# Patient Record
Sex: MALE | Race: WHITE | Employment: UNEMPLOYED | URBAN - METROPOLITAN AREA
[De-identification: names, ages, dates, MRNs, and addresses within clinical notes are randomized per-mention and may not be internally consistent; named-entity substitution may affect disease eponyms.]

---

## 2019-07-07 ENCOUNTER — HOSPITAL ENCOUNTER (INPATIENT)
Age: 54
LOS: 8 days | Discharge: HOME HEALTH | DRG: 854 | End: 2019-07-15
Attending: EMERGENCY MEDICINE | Admitting: INTERNAL MEDICINE
Payer: COMMERCIAL

## 2019-07-07 ENCOUNTER — APPOINTMENT (OUTPATIENT)
Dept: GENERAL RADIOLOGY | Age: 54
DRG: 854 | End: 2019-07-07
Payer: COMMERCIAL

## 2019-07-07 DIAGNOSIS — L03.115 CELLULITIS OF RIGHT FOOT: ICD-10-CM

## 2019-07-07 DIAGNOSIS — A41.9 SEPTICEMIA (HCC): Primary | ICD-10-CM

## 2019-07-07 DIAGNOSIS — R50.9 FEVER, UNSPECIFIED FEVER CAUSE: ICD-10-CM

## 2019-07-07 DIAGNOSIS — M86.171 OTHER ACUTE OSTEOMYELITIS OF RIGHT FOOT (HCC): ICD-10-CM

## 2019-07-07 DIAGNOSIS — S92.351A CLOSED DISPLACED FRACTURE OF FIFTH METATARSAL BONE OF RIGHT FOOT, INITIAL ENCOUNTER: ICD-10-CM

## 2019-07-07 PROBLEM — L08.9 TYPE 2 DIABETES MELLITUS WITH RIGHT DIABETIC FOOT INFECTION (HCC): Status: ACTIVE | Noted: 2019-07-07

## 2019-07-07 PROBLEM — M86.9 OSTEOMYELITIS (HCC): Status: ACTIVE | Noted: 2019-07-07

## 2019-07-07 PROBLEM — I15.2 HYPERTENSION ASSOCIATED WITH DIABETES (HCC): Status: ACTIVE | Noted: 2019-03-26

## 2019-07-07 PROBLEM — L08.9 RIGHT FOOT INFECTION: Status: ACTIVE | Noted: 2019-07-07

## 2019-07-07 PROBLEM — E11.9 TYPE 2 DIABETES MELLITUS (HCC): Status: ACTIVE | Noted: 2019-03-26

## 2019-07-07 PROBLEM — E11.628 TYPE 2 DIABETES MELLITUS WITH RIGHT DIABETIC FOOT INFECTION (HCC): Status: ACTIVE | Noted: 2019-07-07

## 2019-07-07 PROBLEM — E11.59 HYPERTENSION ASSOCIATED WITH DIABETES (HCC): Status: ACTIVE | Noted: 2019-03-26

## 2019-07-07 LAB
A/G RATIO: 0.8 (ref 1.1–2.2)
ALBUMIN SERPL-MCNC: 3.4 G/DL (ref 3.4–5)
ALP BLD-CCNC: 126 U/L (ref 40–129)
ALT SERPL-CCNC: 21 U/L (ref 10–40)
ANION GAP SERPL CALCULATED.3IONS-SCNC: 16 MMOL/L (ref 3–16)
AST SERPL-CCNC: 28 U/L (ref 15–37)
BASE EXCESS VENOUS: 2.5 MMOL/L (ref -3–3)
BASOPHILS ABSOLUTE: 0.1 K/UL (ref 0–0.2)
BASOPHILS RELATIVE PERCENT: 0.8 %
BILIRUB SERPL-MCNC: 0.6 MG/DL (ref 0–1)
BUN BLDV-MCNC: 17 MG/DL (ref 7–20)
CALCIUM SERPL-MCNC: 9.2 MG/DL (ref 8.3–10.6)
CARBOXYHEMOGLOBIN: 1.5 % (ref 0–1.5)
CHLORIDE BLD-SCNC: 93 MMOL/L (ref 99–110)
CO2: 24 MMOL/L (ref 21–32)
CREAT SERPL-MCNC: 1 MG/DL (ref 0.9–1.3)
EOSINOPHILS ABSOLUTE: 0 K/UL (ref 0–0.6)
EOSINOPHILS RELATIVE PERCENT: 0.1 %
GFR AFRICAN AMERICAN: >60
GFR NON-AFRICAN AMERICAN: >60
GLOBULIN: 4.2 G/DL
GLUCOSE BLD-MCNC: 133 MG/DL (ref 70–99)
GLUCOSE BLD-MCNC: 158 MG/DL (ref 70–99)
HCO3 VENOUS: 24.3 MMOL/L (ref 23–29)
HCT VFR BLD CALC: 39.9 % (ref 40.5–52.5)
HEMOGLOBIN: 13.7 G/DL (ref 13.5–17.5)
LACTIC ACID, SEPSIS: 1.4 MMOL/L (ref 0.4–1.9)
LYMPHOCYTES ABSOLUTE: 1.8 K/UL (ref 1–5.1)
LYMPHOCYTES RELATIVE PERCENT: 12.8 %
MCH RBC QN AUTO: 30.1 PG (ref 26–34)
MCHC RBC AUTO-ENTMCNC: 34.2 G/DL (ref 31–36)
MCV RBC AUTO: 88 FL (ref 80–100)
METHEMOGLOBIN VENOUS: 0.6 %
MONOCYTES ABSOLUTE: 1.3 K/UL (ref 0–1.3)
MONOCYTES RELATIVE PERCENT: 9.9 %
NEUTROPHILS ABSOLUTE: 10.4 K/UL (ref 1.7–7.7)
NEUTROPHILS RELATIVE PERCENT: 76.4 %
O2 CONTENT, VEN: 19 VOL %
O2 SAT, VEN: 94 %
O2 THERAPY: ABNORMAL
PCO2, VEN: 29.9 MMHG (ref 40–50)
PDW BLD-RTO: 12.8 % (ref 12.4–15.4)
PERFORMED ON: ABNORMAL
PH VENOUS: 7.53 (ref 7.35–7.45)
PLATELET # BLD: 351 K/UL (ref 135–450)
PMV BLD AUTO: 7.2 FL (ref 5–10.5)
PO2, VEN: 64.9 MMHG (ref 25–40)
POTASSIUM SERPL-SCNC: 4.1 MMOL/L (ref 3.5–5.1)
RBC # BLD: 4.53 M/UL (ref 4.2–5.9)
SEDIMENTATION RATE, ERYTHROCYTE: 101 MM/HR (ref 0–20)
SODIUM BLD-SCNC: 133 MMOL/L (ref 136–145)
TCO2 CALC VENOUS: 25 MMOL/L
TOTAL PROTEIN: 7.6 G/DL (ref 6.4–8.2)
WBC # BLD: 13.7 K/UL (ref 4–11)

## 2019-07-07 PROCEDURE — 86403 PARTICLE AGGLUT ANTBDY SCRN: CPT

## 2019-07-07 PROCEDURE — 6370000000 HC RX 637 (ALT 250 FOR IP): Performed by: PHYSICIAN ASSISTANT

## 2019-07-07 PROCEDURE — 82803 BLOOD GASES ANY COMBINATION: CPT

## 2019-07-07 PROCEDURE — 2580000003 HC RX 258: Performed by: INTERNAL MEDICINE

## 2019-07-07 PROCEDURE — 73630 X-RAY EXAM OF FOOT: CPT

## 2019-07-07 PROCEDURE — 2580000003 HC RX 258: Performed by: PHYSICIAN ASSISTANT

## 2019-07-07 PROCEDURE — 6360000002 HC RX W HCPCS: Performed by: INTERNAL MEDICINE

## 2019-07-07 PROCEDURE — 87205 SMEAR GRAM STAIN: CPT

## 2019-07-07 PROCEDURE — 96367 TX/PROPH/DG ADDL SEQ IV INF: CPT

## 2019-07-07 PROCEDURE — 80053 COMPREHEN METABOLIC PANEL: CPT

## 2019-07-07 PROCEDURE — 99284 EMERGENCY DEPT VISIT MOD MDM: CPT

## 2019-07-07 PROCEDURE — 6370000000 HC RX 637 (ALT 250 FOR IP): Performed by: INTERNAL MEDICINE

## 2019-07-07 PROCEDURE — 87077 CULTURE AEROBIC IDENTIFY: CPT

## 2019-07-07 PROCEDURE — 87070 CULTURE OTHR SPECIMN AEROBIC: CPT

## 2019-07-07 PROCEDURE — 6360000002 HC RX W HCPCS: Performed by: PHYSICIAN ASSISTANT

## 2019-07-07 PROCEDURE — 85025 COMPLETE CBC W/AUTO DIFF WBC: CPT

## 2019-07-07 PROCEDURE — 87040 BLOOD CULTURE FOR BACTERIA: CPT

## 2019-07-07 PROCEDURE — 1200000000 HC SEMI PRIVATE

## 2019-07-07 PROCEDURE — 83605 ASSAY OF LACTIC ACID: CPT

## 2019-07-07 PROCEDURE — 87186 SC STD MICRODIL/AGAR DIL: CPT

## 2019-07-07 PROCEDURE — 86140 C-REACTIVE PROTEIN: CPT

## 2019-07-07 PROCEDURE — 85652 RBC SED RATE AUTOMATED: CPT

## 2019-07-07 PROCEDURE — 96365 THER/PROPH/DIAG IV INF INIT: CPT

## 2019-07-07 RX ORDER — CYCLOBENZAPRINE HCL 10 MG
10 TABLET ORAL 3 TIMES DAILY PRN
Status: DISCONTINUED | OUTPATIENT
Start: 2019-07-07 | End: 2019-07-15 | Stop reason: HOSPADM

## 2019-07-07 RX ORDER — SODIUM CHLORIDE 0.9 % (FLUSH) 0.9 %
10 SYRINGE (ML) INJECTION EVERY 12 HOURS SCHEDULED
Status: DISCONTINUED | OUTPATIENT
Start: 2019-07-07 | End: 2019-07-15 | Stop reason: HOSPADM

## 2019-07-07 RX ORDER — DEXTROSE MONOHYDRATE 50 MG/ML
100 INJECTION, SOLUTION INTRAVENOUS PRN
Status: DISCONTINUED | OUTPATIENT
Start: 2019-07-07 | End: 2019-07-15 | Stop reason: HOSPADM

## 2019-07-07 RX ORDER — GLIPIZIDE 5 MG/1
15 TABLET ORAL DAILY PRN
COMMUNITY
Start: 2019-04-30 | End: 2021-08-24

## 2019-07-07 RX ORDER — 0.9 % SODIUM CHLORIDE 0.9 %
30 INTRAVENOUS SOLUTION INTRAVENOUS ONCE
Status: COMPLETED | OUTPATIENT
Start: 2019-07-07 | End: 2019-07-07

## 2019-07-07 RX ORDER — ACETAMINOPHEN 500 MG
1000 TABLET ORAL ONCE
Status: COMPLETED | OUTPATIENT
Start: 2019-07-07 | End: 2019-07-07

## 2019-07-07 RX ORDER — SODIUM CHLORIDE 9 MG/ML
INJECTION, SOLUTION INTRAVENOUS CONTINUOUS
Status: DISCONTINUED | OUTPATIENT
Start: 2019-07-07 | End: 2019-07-15 | Stop reason: HOSPADM

## 2019-07-07 RX ORDER — MORPHINE SULFATE 2 MG/ML
4 INJECTION, SOLUTION INTRAMUSCULAR; INTRAVENOUS
Status: DISCONTINUED | OUTPATIENT
Start: 2019-07-07 | End: 2019-07-15 | Stop reason: HOSPADM

## 2019-07-07 RX ORDER — DEXTROSE MONOHYDRATE 25 G/50ML
12.5 INJECTION, SOLUTION INTRAVENOUS PRN
Status: DISCONTINUED | OUTPATIENT
Start: 2019-07-07 | End: 2019-07-15 | Stop reason: HOSPADM

## 2019-07-07 RX ORDER — CYCLOBENZAPRINE HCL 10 MG
10 TABLET ORAL 3 TIMES DAILY PRN
COMMUNITY
End: 2021-08-24

## 2019-07-07 RX ORDER — ONDANSETRON 2 MG/ML
4 INJECTION INTRAMUSCULAR; INTRAVENOUS EVERY 6 HOURS PRN
Status: DISCONTINUED | OUTPATIENT
Start: 2019-07-07 | End: 2019-07-15 | Stop reason: HOSPADM

## 2019-07-07 RX ORDER — MORPHINE SULFATE 2 MG/ML
2 INJECTION, SOLUTION INTRAMUSCULAR; INTRAVENOUS
Status: DISCONTINUED | OUTPATIENT
Start: 2019-07-07 | End: 2019-07-15 | Stop reason: HOSPADM

## 2019-07-07 RX ORDER — SODIUM CHLORIDE 0.9 % (FLUSH) 0.9 %
10 SYRINGE (ML) INJECTION PRN
Status: DISCONTINUED | OUTPATIENT
Start: 2019-07-07 | End: 2019-07-15 | Stop reason: HOSPADM

## 2019-07-07 RX ORDER — ACETAMINOPHEN 325 MG/1
650 TABLET ORAL EVERY 4 HOURS PRN
Status: DISCONTINUED | OUTPATIENT
Start: 2019-07-07 | End: 2019-07-15 | Stop reason: HOSPADM

## 2019-07-07 RX ORDER — NICOTINE POLACRILEX 4 MG
15 LOZENGE BUCCAL PRN
Status: DISCONTINUED | OUTPATIENT
Start: 2019-07-07 | End: 2019-07-15 | Stop reason: HOSPADM

## 2019-07-07 RX ADMIN — PIPERACILLIN SODIUM,TAZOBACTAM SODIUM 4.5 G: 4; .5 INJECTION, POWDER, FOR SOLUTION INTRAVENOUS at 19:09

## 2019-07-07 RX ADMIN — INSULIN LISPRO 1 UNITS: 100 INJECTION, SOLUTION INTRAVENOUS; SUBCUTANEOUS at 22:46

## 2019-07-07 RX ADMIN — ACETAMINOPHEN 1000 MG: 500 TABLET ORAL at 18:49

## 2019-07-07 RX ADMIN — VANCOMYCIN HYDROCHLORIDE 1250 MG: 10 INJECTION, POWDER, LYOPHILIZED, FOR SOLUTION INTRAVENOUS at 19:55

## 2019-07-07 RX ADMIN — SODIUM CHLORIDE 2721 ML: 9 INJECTION, SOLUTION INTRAVENOUS at 18:48

## 2019-07-07 RX ADMIN — MORPHINE SULFATE 4 MG: 2 INJECTION, SOLUTION INTRAMUSCULAR; INTRAVENOUS at 22:46

## 2019-07-07 RX ADMIN — SODIUM CHLORIDE: 9 INJECTION, SOLUTION INTRAVENOUS at 22:45

## 2019-07-07 ASSESSMENT — PAIN SCALES - GENERAL
PAINLEVEL_OUTOF10: 8
PAINLEVEL_OUTOF10: 8
PAINLEVEL_OUTOF10: 7
PAINLEVEL_OUTOF10: 6
PAINLEVEL_OUTOF10: 7

## 2019-07-07 ASSESSMENT — PAIN DESCRIPTION - ORIENTATION
ORIENTATION: RIGHT
ORIENTATION: RIGHT

## 2019-07-07 ASSESSMENT — PAIN DESCRIPTION - PAIN TYPE
TYPE: ACUTE PAIN
TYPE: ACUTE PAIN

## 2019-07-07 ASSESSMENT — ENCOUNTER SYMPTOMS
SHORTNESS OF BREATH: 0
EYES NEGATIVE: 1
VOMITING: 1
ABDOMINAL PAIN: 0
COLOR CHANGE: 0
BACK PAIN: 0
COUGH: 0
NAUSEA: 1

## 2019-07-07 ASSESSMENT — PAIN DESCRIPTION - LOCATION
LOCATION: FOOT
LOCATION: FOOT

## 2019-07-07 NOTE — ED PROVIDER NOTES
foot). Negative for back pain and neck pain. Skin: Positive for wound (right foot). Negative for color change. Neurological: Positive for light-headedness. Negative for dizziness, weakness and headaches. All other systems reviewed and are negative. Exceptas noted above in the ROS, all other systems were reviewed and negative. PAST MEDICAL HISTORY:     Past Medical History:   Diagnosis Date    Diabetes mellitus ([facility] [address]     Foot ulcer ([facility] [address]     Right foot/diabetic         SURGICAL HISTORY:      Past Surgical History:   Procedure Laterality Date    ANKLE SURGERY      ORTHOPEDIC SURGERY      right ankle         CURRENT MEDICATIONS:       Previous Medications    CYCLOBENZAPRINE (FLEXERIL) 10 MG TABLET    Take 10 mg by mouth 3 times daily as needed    GLIPIZIDE (GLUCOTROL) 5 MG TABLET    Take 15 mg by mouth daily as needed         ALLERGIES:    Metformin and related    FAMILY HISTORY:     History reviewed. No pertinent family history.        SOCIAL HISTORY:       Social History     Socioeconomic History    Marital status:      Spouse name: None    Number of children: None    Years of education: None    Highest education level: None   Occupational History    None   Social Needs    Financial resource strain: None    Food insecurity:     Worry: None     Inability: None    Transportation needs:     Medical: None     Non-medical: None   Tobacco Use    Smoking status: Never Smoker    Smokeless tobacco: Never Used   Substance and Sexual Activity    Alcohol use: No    Drug use: No    Sexual activity: Not Currently   Lifestyle    Physical activity:     Days per week: None     Minutes per session: None    Stress: None   Relationships    Social connections:     Talks on phone: None     Gets together: None     Attends Sikhism service: None     Active member of club or organization: None     Attends meetings of clubs or organizations: None     Relationship status: None    Intimate partner department course. Based upon that discussion, we've decided to admit Bridgette Ying for further observation and evaluation of Ramya Horner's septicemia as a result of right foot infection including cellulitis and osteomyelitis. As I have deemed necessary from their history, physical, and studies, I have considered and evaluated Bridgette Ying for the following diagnoses: SEPSIS, CELLULITIS, ABSCESS or DEEP SPACE INFECTIONS, ZULMA'S GANGRENE, DEEP VENOUS THROMBOSIS and TOXIC SHOCK SYNDROME. FINAL IMPRESSION:      1. Septicemia ([address]    2. Other acute osteomyelitis of right foot ([facility] [address]    3. Closed displaced fracture of fifth metatarsal bone of right foot, initial encounter    4. Cellulitis of right foot    5.  Fever, unspecified fever cause          DISPOSITION/PLAN:   DISPOSITION     ADMIT                 (Please note thatportions of this note were completed with a voice recognition program.  Efforts were made to edit the dictations, but occasionally words are mis-transcribed.)    Theodore Davies PA-C (electronicallysigned)              Rocío Guzman Alavivianama  07/07/19 2056

## 2019-07-08 ENCOUNTER — ANESTHESIA (OUTPATIENT)
Dept: OPERATING ROOM | Age: 54
DRG: 854 | End: 2019-07-08
Payer: COMMERCIAL

## 2019-07-08 ENCOUNTER — APPOINTMENT (OUTPATIENT)
Dept: MRI IMAGING | Age: 54
DRG: 854 | End: 2019-07-08
Payer: COMMERCIAL

## 2019-07-08 ENCOUNTER — APPOINTMENT (OUTPATIENT)
Dept: GENERAL RADIOLOGY | Age: 54
DRG: 854 | End: 2019-07-08
Payer: COMMERCIAL

## 2019-07-08 ENCOUNTER — ANESTHESIA EVENT (OUTPATIENT)
Dept: OPERATING ROOM | Age: 54
DRG: 854 | End: 2019-07-08
Payer: COMMERCIAL

## 2019-07-08 VITALS — DIASTOLIC BLOOD PRESSURE: 63 MMHG | SYSTOLIC BLOOD PRESSURE: 104 MMHG | OXYGEN SATURATION: 100 %

## 2019-07-08 PROBLEM — E11.65 POORLY CONTROLLED TYPE 2 DIABETES MELLITUS (HCC): Status: ACTIVE | Noted: 2019-07-07

## 2019-07-08 LAB
ANION GAP SERPL CALCULATED.3IONS-SCNC: 10 MMOL/L (ref 3–16)
BASOPHILS ABSOLUTE: 0.1 K/UL (ref 0–0.2)
BASOPHILS RELATIVE PERCENT: 0.6 %
BILIRUBIN URINE: NEGATIVE
BLOOD, URINE: ABNORMAL
BUN BLDV-MCNC: 17 MG/DL (ref 7–20)
C-REACTIVE PROTEIN: 292.4 MG/L (ref 0–5.1)
CALCIUM SERPL-MCNC: 8.7 MG/DL (ref 8.3–10.6)
CHLORIDE BLD-SCNC: 101 MMOL/L (ref 99–110)
CLARITY: CLEAR
CO2: 26 MMOL/L (ref 21–32)
COLOR: YELLOW
CREAT SERPL-MCNC: 1.1 MG/DL (ref 0.9–1.3)
EOSINOPHILS ABSOLUTE: 0.1 K/UL (ref 0–0.6)
EOSINOPHILS RELATIVE PERCENT: 0.8 %
EPITHELIAL CELLS, UA: ABNORMAL /HPF
ESTIMATED AVERAGE GLUCOSE: 122.6 MG/DL
GFR AFRICAN AMERICAN: >60
GFR NON-AFRICAN AMERICAN: >60
GLUCOSE BLD-MCNC: 126 MG/DL (ref 70–99)
GLUCOSE BLD-MCNC: 130 MG/DL (ref 70–99)
GLUCOSE BLD-MCNC: 142 MG/DL (ref 70–99)
GLUCOSE BLD-MCNC: 161 MG/DL (ref 70–99)
GLUCOSE BLD-MCNC: 223 MG/DL (ref 70–99)
GLUCOSE URINE: NEGATIVE MG/DL
HBA1C MFR BLD: 5.9 %
HCT VFR BLD CALC: 36.4 % (ref 40.5–52.5)
HEMOGLOBIN: 12.2 G/DL (ref 13.5–17.5)
KETONES, URINE: NEGATIVE MG/DL
LEUKOCYTE ESTERASE, URINE: NEGATIVE
LYMPHOCYTES ABSOLUTE: 1.5 K/UL (ref 1–5.1)
LYMPHOCYTES RELATIVE PERCENT: 11.8 %
MCH RBC QN AUTO: 30.2 PG (ref 26–34)
MCHC RBC AUTO-ENTMCNC: 33.4 G/DL (ref 31–36)
MCV RBC AUTO: 90.5 FL (ref 80–100)
MICROSCOPIC EXAMINATION: YES
MONOCYTES ABSOLUTE: 1.5 K/UL (ref 0–1.3)
MONOCYTES RELATIVE PERCENT: 11.2 %
NEUTROPHILS ABSOLUTE: 9.8 K/UL (ref 1.7–7.7)
NEUTROPHILS RELATIVE PERCENT: 75.6 %
NITRITE, URINE: NEGATIVE
PDW BLD-RTO: 12.8 % (ref 12.4–15.4)
PERFORMED ON: ABNORMAL
PH UA: 6.5 (ref 5–8)
PLATELET # BLD: 313 K/UL (ref 135–450)
PMV BLD AUTO: 7.2 FL (ref 5–10.5)
POTASSIUM REFLEX MAGNESIUM: 4.3 MMOL/L (ref 3.5–5.1)
PROTEIN UA: NEGATIVE MG/DL
RBC # BLD: 4.03 M/UL (ref 4.2–5.9)
RBC UA: ABNORMAL /HPF (ref 0–2)
SODIUM BLD-SCNC: 137 MMOL/L (ref 136–145)
SPECIFIC GRAVITY UA: <=1.005 (ref 1–1.03)
URINE TYPE: ABNORMAL
UROBILINOGEN, URINE: 0.2 E.U./DL
WBC # BLD: 13 K/UL (ref 4–11)
WBC UA: ABNORMAL /HPF (ref 0–5)

## 2019-07-08 PROCEDURE — 2580000003 HC RX 258: Performed by: PODIATRIST

## 2019-07-08 PROCEDURE — 88305 TISSUE EXAM BY PATHOLOGIST: CPT

## 2019-07-08 PROCEDURE — 73620 X-RAY EXAM OF FOOT: CPT

## 2019-07-08 PROCEDURE — 99255 IP/OBS CONSLTJ NEW/EST HI 80: CPT | Performed by: INTERNAL MEDICINE

## 2019-07-08 PROCEDURE — 87076 CULTURE ANAEROBE IDENT EACH: CPT

## 2019-07-08 PROCEDURE — 1200000000 HC SEMI PRIVATE

## 2019-07-08 PROCEDURE — 6370000000 HC RX 637 (ALT 250 FOR IP): Performed by: PODIATRIST

## 2019-07-08 PROCEDURE — 80048 BASIC METABOLIC PNL TOTAL CA: CPT

## 2019-07-08 PROCEDURE — 64447 NJX AA&/STRD FEMORAL NRV IMG: CPT | Performed by: ANESTHESIOLOGY

## 2019-07-08 PROCEDURE — 0Y6M0Z8 DETACHMENT AT RIGHT FOOT, COMPLETE 5TH RAY, OPEN APPROACH: ICD-10-PCS | Performed by: PODIATRIST

## 2019-07-08 PROCEDURE — 7100000000 HC PACU RECOVERY - FIRST 15 MIN: Performed by: PODIATRIST

## 2019-07-08 PROCEDURE — 6370000000 HC RX 637 (ALT 250 FOR IP): Performed by: INTERNAL MEDICINE

## 2019-07-08 PROCEDURE — 87205 SMEAR GRAM STAIN: CPT

## 2019-07-08 PROCEDURE — 2709999900 HC NON-CHARGEABLE SUPPLY: Performed by: PODIATRIST

## 2019-07-08 PROCEDURE — 3700000001 HC ADD 15 MINUTES (ANESTHESIA): Performed by: PODIATRIST

## 2019-07-08 PROCEDURE — 6360000002 HC RX W HCPCS: Performed by: PODIATRIST

## 2019-07-08 PROCEDURE — 87070 CULTURE OTHR SPECIMN AEROBIC: CPT

## 2019-07-08 PROCEDURE — 6360000002 HC RX W HCPCS: Performed by: ANESTHESIOLOGY

## 2019-07-08 PROCEDURE — 88311 DECALCIFY TISSUE: CPT

## 2019-07-08 PROCEDURE — 3600000002 HC SURGERY LEVEL 2 BASE: Performed by: PODIATRIST

## 2019-07-08 PROCEDURE — 2500000003 HC RX 250 WO HCPCS: Performed by: NURSE ANESTHETIST, CERTIFIED REGISTERED

## 2019-07-08 PROCEDURE — 6370000000 HC RX 637 (ALT 250 FOR IP): Performed by: UROLOGY

## 2019-07-08 PROCEDURE — 87641 MR-STAPH DNA AMP PROBE: CPT

## 2019-07-08 PROCEDURE — 6360000002 HC RX W HCPCS: Performed by: INTERNAL MEDICINE

## 2019-07-08 PROCEDURE — 3700000000 HC ANESTHESIA ATTENDED CARE: Performed by: PODIATRIST

## 2019-07-08 PROCEDURE — 83036 HEMOGLOBIN GLYCOSYLATED A1C: CPT

## 2019-07-08 PROCEDURE — 2500000003 HC RX 250 WO HCPCS: Performed by: ANESTHESIOLOGY

## 2019-07-08 PROCEDURE — 2580000003 HC RX 258: Performed by: INTERNAL MEDICINE

## 2019-07-08 PROCEDURE — 2580000003 HC RX 258: Performed by: ANESTHESIOLOGY

## 2019-07-08 PROCEDURE — 87075 CULTR BACTERIA EXCEPT BLOOD: CPT

## 2019-07-08 PROCEDURE — 87185 SC STD ENZYME DETCJ PER NZM: CPT

## 2019-07-08 PROCEDURE — 6360000002 HC RX W HCPCS: Performed by: NURSE ANESTHETIST, CERTIFIED REGISTERED

## 2019-07-08 PROCEDURE — 76942 ECHO GUIDE FOR BIOPSY: CPT | Performed by: ANESTHESIOLOGY

## 2019-07-08 PROCEDURE — 51798 US URINE CAPACITY MEASURE: CPT

## 2019-07-08 PROCEDURE — 36415 COLL VENOUS BLD VENIPUNCTURE: CPT

## 2019-07-08 PROCEDURE — 85025 COMPLETE CBC W/AUTO DIFF WBC: CPT

## 2019-07-08 PROCEDURE — 81001 URINALYSIS AUTO W/SCOPE: CPT

## 2019-07-08 PROCEDURE — 7100000001 HC PACU RECOVERY - ADDTL 15 MIN: Performed by: PODIATRIST

## 2019-07-08 PROCEDURE — 3600000012 HC SURGERY LEVEL 2 ADDTL 15MIN: Performed by: PODIATRIST

## 2019-07-08 PROCEDURE — 51702 INSERT TEMP BLADDER CATH: CPT

## 2019-07-08 RX ORDER — FINASTERIDE 5 MG/1
5 TABLET, FILM COATED ORAL DAILY
Status: DISCONTINUED | OUTPATIENT
Start: 2019-07-08 | End: 2019-07-15 | Stop reason: HOSPADM

## 2019-07-08 RX ORDER — LIDOCAINE HYDROCHLORIDE 20 MG/ML
INJECTION, SOLUTION EPIDURAL; INFILTRATION; INTRACAUDAL; PERINEURAL PRN
Status: DISCONTINUED | OUTPATIENT
Start: 2019-07-08 | End: 2019-07-08 | Stop reason: SDUPTHER

## 2019-07-08 RX ORDER — OXYCODONE HYDROCHLORIDE AND ACETAMINOPHEN 5; 325 MG/1; MG/1
2 TABLET ORAL PRN
Status: DISCONTINUED | OUTPATIENT
Start: 2019-07-08 | End: 2019-07-08 | Stop reason: HOSPADM

## 2019-07-08 RX ORDER — MIDAZOLAM HYDROCHLORIDE 1 MG/ML
INJECTION INTRAMUSCULAR; INTRAVENOUS PRN
Status: DISCONTINUED | OUTPATIENT
Start: 2019-07-08 | End: 2019-07-08 | Stop reason: SDUPTHER

## 2019-07-08 RX ORDER — ONDANSETRON 2 MG/ML
4 INJECTION INTRAMUSCULAR; INTRAVENOUS
Status: DISCONTINUED | OUTPATIENT
Start: 2019-07-08 | End: 2019-07-08 | Stop reason: HOSPADM

## 2019-07-08 RX ORDER — FENTANYL CITRATE 50 UG/ML
INJECTION, SOLUTION INTRAMUSCULAR; INTRAVENOUS PRN
Status: DISCONTINUED | OUTPATIENT
Start: 2019-07-08 | End: 2019-07-08 | Stop reason: SDUPTHER

## 2019-07-08 RX ORDER — BUPIVACAINE HYDROCHLORIDE 2.5 MG/ML
INJECTION, SOLUTION EPIDURAL; INFILTRATION; INTRACAUDAL PRN
Status: DISCONTINUED | OUTPATIENT
Start: 2019-07-08 | End: 2019-07-08 | Stop reason: SDUPTHER

## 2019-07-08 RX ORDER — HYDRALAZINE HYDROCHLORIDE 20 MG/ML
5 INJECTION INTRAMUSCULAR; INTRAVENOUS EVERY 10 MIN PRN
Status: DISCONTINUED | OUTPATIENT
Start: 2019-07-08 | End: 2019-07-08 | Stop reason: HOSPADM

## 2019-07-08 RX ORDER — MAGNESIUM HYDROXIDE 1200 MG/15ML
LIQUID ORAL CONTINUOUS PRN
Status: COMPLETED | OUTPATIENT
Start: 2019-07-08 | End: 2019-07-08

## 2019-07-08 RX ORDER — SODIUM CHLORIDE, SODIUM LACTATE, POTASSIUM CHLORIDE, CALCIUM CHLORIDE 600; 310; 30; 20 MG/100ML; MG/100ML; MG/100ML; MG/100ML
INJECTION, SOLUTION INTRAVENOUS CONTINUOUS PRN
Status: DISCONTINUED | OUTPATIENT
Start: 2019-07-08 | End: 2019-07-08 | Stop reason: SDUPTHER

## 2019-07-08 RX ORDER — PROMETHAZINE HYDROCHLORIDE 25 MG/ML
6.25 INJECTION, SOLUTION INTRAMUSCULAR; INTRAVENOUS
Status: DISCONTINUED | OUTPATIENT
Start: 2019-07-08 | End: 2019-07-08 | Stop reason: HOSPADM

## 2019-07-08 RX ORDER — PROPOFOL 10 MG/ML
INJECTION, EMULSION INTRAVENOUS PRN
Status: DISCONTINUED | OUTPATIENT
Start: 2019-07-08 | End: 2019-07-08 | Stop reason: SDUPTHER

## 2019-07-08 RX ORDER — LINEZOLID 2 MG/ML
600 INJECTION, SOLUTION INTRAVENOUS EVERY 12 HOURS
Status: DISCONTINUED | OUTPATIENT
Start: 2019-07-08 | End: 2019-07-10

## 2019-07-08 RX ORDER — TAMSULOSIN HYDROCHLORIDE 0.4 MG/1
0.4 CAPSULE ORAL DAILY
Status: DISCONTINUED | OUTPATIENT
Start: 2019-07-08 | End: 2019-07-15 | Stop reason: HOSPADM

## 2019-07-08 RX ORDER — FENTANYL CITRATE 50 UG/ML
25 INJECTION, SOLUTION INTRAMUSCULAR; INTRAVENOUS EVERY 5 MIN PRN
Status: DISCONTINUED | OUTPATIENT
Start: 2019-07-08 | End: 2019-07-08 | Stop reason: HOSPADM

## 2019-07-08 RX ORDER — DEXAMETHASONE SODIUM PHOSPHATE 4 MG/ML
INJECTION, SOLUTION INTRA-ARTICULAR; INTRALESIONAL; INTRAMUSCULAR; INTRAVENOUS; SOFT TISSUE PRN
Status: DISCONTINUED | OUTPATIENT
Start: 2019-07-08 | End: 2019-07-08 | Stop reason: SDUPTHER

## 2019-07-08 RX ORDER — OXYCODONE HYDROCHLORIDE AND ACETAMINOPHEN 5; 325 MG/1; MG/1
1 TABLET ORAL PRN
Status: DISCONTINUED | OUTPATIENT
Start: 2019-07-08 | End: 2019-07-08 | Stop reason: HOSPADM

## 2019-07-08 RX ORDER — MEPERIDINE HYDROCHLORIDE 50 MG/ML
12.5 INJECTION INTRAMUSCULAR; INTRAVENOUS; SUBCUTANEOUS EVERY 5 MIN PRN
Status: DISCONTINUED | OUTPATIENT
Start: 2019-07-08 | End: 2019-07-08 | Stop reason: HOSPADM

## 2019-07-08 RX ORDER — ONDANSETRON 2 MG/ML
INJECTION INTRAMUSCULAR; INTRAVENOUS PRN
Status: DISCONTINUED | OUTPATIENT
Start: 2019-07-08 | End: 2019-07-08 | Stop reason: SDUPTHER

## 2019-07-08 RX ADMIN — PIPERACILLIN SODIUM,TAZOBACTAM SODIUM 3.38 G: 3; .375 INJECTION, POWDER, FOR SOLUTION INTRAVENOUS at 22:04

## 2019-07-08 RX ADMIN — FENTANYL CITRATE 25 MCG: 50 INJECTION INTRAMUSCULAR; INTRAVENOUS at 16:31

## 2019-07-08 RX ADMIN — Medication 10 ML: at 20:47

## 2019-07-08 RX ADMIN — INSULIN LISPRO 1 UNITS: 100 INJECTION, SOLUTION INTRAVENOUS; SUBCUTANEOUS at 12:24

## 2019-07-08 RX ADMIN — TAMSULOSIN HYDROCHLORIDE 0.4 MG: 0.4 CAPSULE ORAL at 09:29

## 2019-07-08 RX ADMIN — FENTANYL CITRATE 50 MCG: 50 INJECTION INTRAMUSCULAR; INTRAVENOUS at 16:19

## 2019-07-08 RX ADMIN — LIDOCAINE HYDROCHLORIDE 40 MG: 20 INJECTION, SOLUTION EPIDURAL; INFILTRATION; INTRACAUDAL; PERINEURAL at 16:25

## 2019-07-08 RX ADMIN — MORPHINE SULFATE 2 MG: 2 INJECTION, SOLUTION INTRAMUSCULAR; INTRAVENOUS at 22:05

## 2019-07-08 RX ADMIN — PHENYLEPHRINE HYDROCHLORIDE 100 MCG: 10 INJECTION INTRAVENOUS at 17:03

## 2019-07-08 RX ADMIN — SODIUM CHLORIDE, POTASSIUM CHLORIDE, SODIUM LACTATE AND CALCIUM CHLORIDE: 600; 310; 30; 20 INJECTION, SOLUTION INTRAVENOUS at 15:59

## 2019-07-08 RX ADMIN — CYCLOBENZAPRINE HYDROCHLORIDE 10 MG: 10 TABLET, FILM COATED ORAL at 04:20

## 2019-07-08 RX ADMIN — HYDROMORPHONE HYDROCHLORIDE 0.5 MG: 1 INJECTION, SOLUTION INTRAMUSCULAR; INTRAVENOUS; SUBCUTANEOUS at 18:11

## 2019-07-08 RX ADMIN — MORPHINE SULFATE 4 MG: 2 INJECTION, SOLUTION INTRAMUSCULAR; INTRAVENOUS at 09:29

## 2019-07-08 RX ADMIN — AMPICILLIN SODIUM AND SULBACTAM SODIUM 3 G: 2; 1 INJECTION, POWDER, FOR SOLUTION INTRAMUSCULAR; INTRAVENOUS at 06:27

## 2019-07-08 RX ADMIN — AMPICILLIN SODIUM AND SULBACTAM SODIUM 3 G: 2; 1 INJECTION, POWDER, FOR SOLUTION INTRAMUSCULAR; INTRAVENOUS at 12:22

## 2019-07-08 RX ADMIN — MORPHINE SULFATE 2 MG: 2 INJECTION, SOLUTION INTRAMUSCULAR; INTRAVENOUS at 00:47

## 2019-07-08 RX ADMIN — MORPHINE SULFATE 4 MG: 2 INJECTION, SOLUTION INTRAMUSCULAR; INTRAVENOUS at 04:20

## 2019-07-08 RX ADMIN — ONDANSETRON 4 MG: 2 INJECTION INTRAMUSCULAR; INTRAVENOUS at 17:06

## 2019-07-08 RX ADMIN — AMPICILLIN SODIUM AND SULBACTAM SODIUM 3 G: 2; 1 INJECTION, POWDER, FOR SOLUTION INTRAMUSCULAR; INTRAVENOUS at 18:58

## 2019-07-08 RX ADMIN — BUPIVACAINE HYDROCHLORIDE 20 ML: 2.5 INJECTION, SOLUTION EPIDURAL; INFILTRATION; INTRACAUDAL; PERINEURAL at 17:18

## 2019-07-08 RX ADMIN — LINEZOLID 600 MG: 600 INJECTION, SOLUTION INTRAVENOUS at 20:40

## 2019-07-08 RX ADMIN — FENTANYL CITRATE 25 MCG: 50 INJECTION INTRAMUSCULAR; INTRAVENOUS at 16:49

## 2019-07-08 RX ADMIN — BUPIVACAINE HYDROCHLORIDE 30 ML: 2.5 INJECTION, SOLUTION EPIDURAL; INFILTRATION; INTRACAUDAL; PERINEURAL at 17:21

## 2019-07-08 RX ADMIN — INSULIN LISPRO 1 UNITS: 100 INJECTION, SOLUTION INTRAVENOUS; SUBCUTANEOUS at 22:07

## 2019-07-08 RX ADMIN — DEXAMETHASONE SODIUM PHOSPHATE 4 MG: 4 INJECTION, SOLUTION INTRAMUSCULAR; INTRAVENOUS at 16:28

## 2019-07-08 RX ADMIN — CYCLOBENZAPRINE HYDROCHLORIDE 10 MG: 10 TABLET, FILM COATED ORAL at 22:04

## 2019-07-08 RX ADMIN — FENTANYL CITRATE 25 MCG: 50 INJECTION INTRAMUSCULAR; INTRAVENOUS at 16:28

## 2019-07-08 RX ADMIN — FENTANYL CITRATE 50 MCG: 50 INJECTION INTRAMUSCULAR; INTRAVENOUS at 16:22

## 2019-07-08 RX ADMIN — FINASTERIDE 5 MG: 5 TABLET, FILM COATED ORAL at 09:29

## 2019-07-08 RX ADMIN — PHENYLEPHRINE HYDROCHLORIDE 100 MCG: 10 INJECTION INTRAVENOUS at 16:57

## 2019-07-08 RX ADMIN — AMPICILLIN SODIUM AND SULBACTAM SODIUM 3 G: 2; 1 INJECTION, POWDER, FOR SOLUTION INTRAMUSCULAR; INTRAVENOUS at 00:39

## 2019-07-08 RX ADMIN — SODIUM CHLORIDE: 9 INJECTION, SOLUTION INTRAVENOUS at 09:29

## 2019-07-08 RX ADMIN — FENTANYL CITRATE 25 MCG: 50 INJECTION INTRAMUSCULAR; INTRAVENOUS at 16:37

## 2019-07-08 RX ADMIN — MIDAZOLAM HYDROCHLORIDE 2 MG: 2 INJECTION, SOLUTION INTRAMUSCULAR; INTRAVENOUS at 16:16

## 2019-07-08 RX ADMIN — PHENYLEPHRINE HYDROCHLORIDE 100 MCG: 10 INJECTION INTRAVENOUS at 17:08

## 2019-07-08 RX ADMIN — PROPOFOL 200 MG: 10 INJECTION, EMULSION INTRAVENOUS at 16:25

## 2019-07-08 ASSESSMENT — PULMONARY FUNCTION TESTS
PIF_VALUE: 2
PIF_VALUE: 0
PIF_VALUE: 2
PIF_VALUE: 0
PIF_VALUE: 2
PIF_VALUE: 0
PIF_VALUE: 2
PIF_VALUE: 13
PIF_VALUE: 2
PIF_VALUE: 0
PIF_VALUE: 2
PIF_VALUE: 3
PIF_VALUE: 2
PIF_VALUE: 2
PIF_VALUE: 1
PIF_VALUE: 2
PIF_VALUE: 0
PIF_VALUE: 2
PIF_VALUE: 1
PIF_VALUE: 2
PIF_VALUE: 3
PIF_VALUE: 2

## 2019-07-08 ASSESSMENT — PAIN SCALES - GENERAL
PAINLEVEL_OUTOF10: 0
PAINLEVEL_OUTOF10: 0
PAINLEVEL_OUTOF10: 7
PAINLEVEL_OUTOF10: 4
PAINLEVEL_OUTOF10: 7
PAINLEVEL_OUTOF10: 7
PAINLEVEL_OUTOF10: 5
PAINLEVEL_OUTOF10: 4
PAINLEVEL_OUTOF10: 7
PAINLEVEL_OUTOF10: 7
PAINLEVEL_OUTOF10: 0
PAINLEVEL_OUTOF10: 7
PAINLEVEL_OUTOF10: 7

## 2019-07-08 ASSESSMENT — PAIN DESCRIPTION - PAIN TYPE
TYPE: SURGICAL PAIN
TYPE: SURGICAL PAIN
TYPE: ACUTE PAIN
TYPE: SURGICAL PAIN
TYPE: SURGICAL PAIN
TYPE: ACUTE PAIN
TYPE: ACUTE PAIN
TYPE: SURGICAL PAIN

## 2019-07-08 ASSESSMENT — PAIN DESCRIPTION - ORIENTATION
ORIENTATION: RIGHT

## 2019-07-08 ASSESSMENT — PAIN DESCRIPTION - LOCATION
LOCATION: FOOT

## 2019-07-08 ASSESSMENT — LIFESTYLE VARIABLES: SMOKING_STATUS: 0

## 2019-07-08 NOTE — PROGRESS NOTES
Please advise attempted to straight cath patient with smallest FR straight cath and met resistance and unable to straight cath patient. Should patient have Urology consult? Pt. states no prior problems with urination or prostate. Thanks! Perfect Serve sent to Kenneth Wheatley NP. Will continue to monitor.

## 2019-07-08 NOTE — CONSULTS
right fifth metatarsal area with the surrounding redness, swelling and tenderness    He is currently on IV vancomycin and IV Unasyn    He underwent a right fifth toe amputation and a partial amputation of the right fifth metatarsal earlier today    Plan:     Diagnostic Workup:    · Will order nasal MRSA screen  · Agree with blood cultures and right foot wound culture  · Continue to follow fever curve, WBC count and blood cultures  · Follow up on liverand renal functions closely    Antimicrobials:    · Will switch IV Unasyn to IV Zosyn 3.375 g every 8 hours for empiric coverage of Pseudomonas as well in addition to other gram-negative anaerobic organisms  · Combination of IV vancomycin and IV Zosyn would be nephrotoxic  · Will stop IV vancomycin today  · Will order IV linezolid 600 mg every 12 hour  · Continue to monitor his vitals closely  · We will follow-up on the culture results and clinical progress and will make further recommendation accordingly  · Surgical site care  · Maintain good glycemic control  · Pain control  · Fall precaution  · DVT prophylaxis  · Discussed the above plan with patient and RN       Drug Monitoring:    · Continue serial monitoring for antibiotic toxicity as follows: CBC, CMP  · Continue to watch for following: new or worsening fever, hypotension, hives, lip swelling and redness or purulence at vascular access sites. I/v access Management:    · Continue to monitor i.v access sites for erythema, induration, discharge or tenderness. · As always, continue efforts to minimizetubes/lines/drains as clinically appropriate to reduce chances of line associated infections. Patient education and counseling:      · The patient was educated in detailabout the side-effects of various antibiotics and things to watch for like new rashes, lip swelling, severe reaction, worsening diarrhea, break through fever etc.  · Discussed patient'scondition and what to expect.  All of the patient's questions

## 2019-07-08 NOTE — ANESTHESIA PROCEDURE NOTES
Peripheral Block    Patient location during procedure: OR  Start time: 7/8/2019 4:17 PM  End time: 7/8/2019 4:22 PM  Staffing  Anesthesiologist: Abisai Ramos MD  Performed: anesthesiologist   Preanesthetic Checklist  Completed: patient identified, site marked, surgical consent, pre-op evaluation, timeout performed, IV checked, risks and benefits discussed, monitors and equipment checked, anesthesia consent given, oxygen available and patient being monitored  Peripheral Block  Patient position: supine  Prep: ChloraPrep  Patient monitoring: cardiac monitor, continuous pulse ox, frequent blood pressure checks and IV access  Block type: Femoral  Laterality: right  Injection technique: single-shot  Procedures: ultrasound guided  Adductor canal  Provider prep: sterile gloves  Needle  Test dose: negative  Assessment  Injection assessment: negative aspiration for heme, no paresthesia on injection and local visualized surrounding nerve on ultrasound  Slow fractionated injection: yes  Hemodynamics: stable  Additional Notes  Pt. agreed to risks, benefits and alternatives to block prior to going to the OR. Block performed at the request of the surgeon for post-operative pain management   Immediately prior to procedure a \"time out\" was called to verify the correct patient, procedure, equipment, support staff. Site/side marked as required  Side: right  Site/Approach: anteromedial thigh approximately 10-15 cm from the inguinal crease. Position: supine  Sedation: GA  Local Anesthetic Dose:  None  Aseptic technique: prepped with chlorhexidine  Ultrasound:   yes, see attached image  Local Anesthetic: 0.25% Bupivacaine   Amount: 20 ml  in 5 ml increments after negative aspiration each time. Easy injection w/o resistance and w/o pain/paresthesias. Pt tolerated procedure well. No complications.   Reason for block: post-op pain management and at surgeon's request

## 2019-07-08 NOTE — CONSULTS
7/8/2019  Haley Zohras    Reason for Consult:  Retention   Requesting Physician:  Yolanda Hull      History Obtained From:  patient, electronic medical record    HISTORY OF PRESENT ILLNESS:                The patient is a 48 y.o. male who presents with diabetic foot ulcer. He was diagnosed with diabetes earlier this year, HgA1c 13 range. He had trouble voiding and staff unable to do [address]. Past Medical History:        Diagnosis Date    Diabetes mellitus ([facility] [address]     Foot ulcer ([facility] [address]     Right foot/diabetic     Past Surgical History:        Procedure Laterality Date    ANKLE SURGERY      ORTHOPEDIC SURGERY      right ankle     Current Medications:   Current Facility-Administered Medications: cyclobenzaprine (FLEXERIL) tablet 10 mg, 10 mg, Oral, TID PRN  glucose (GLUTOSE) 40 % oral gel 15 g, 15 g, Oral, PRN  dextrose 50 % IV solution, 12.5 g, Intravenous, PRN  glucagon (rDNA) injection 1 mg, 1 mg, Intramuscular, PRN  dextrose 5 % solution, 100 mL/hr, Intravenous, PRN  insulin lispro (HUMALOG) injection vial 0-6 Units, 0-6 Units, Subcutaneous, TID WC  insulin lispro (HUMALOG) injection vial 0-3 Units, 0-3 Units, Subcutaneous, Nightly  sodium chloride flush 0.9 % injection 10 mL, 10 mL, Intravenous, 2 times per day  sodium chloride flush 0.9 % injection 10 mL, 10 mL, Intravenous, PRN  magnesium hydroxide (MILK OF MAGNESIA) 400 MG/5ML suspension 30 mL, 30 mL, Oral, Daily PRN  ondansetron (ZOFRAN) injection 4 mg, 4 mg, Intravenous, Q6H PRN  enoxaparin (LOVENOX) injection 40 mg, 40 mg, Subcutaneous, Daily  0.9 % sodium chloride infusion, , Intravenous, Continuous  ampicillin-sulbactam (UNASYN) 3 g ivpb minibag, 3 g, Intravenous, Q6H  acetaminophen (TYLENOL) tablet 650 mg, 650 mg, Oral, Q4H PRN  morphine (PF) injection 2 mg, 2 mg, Intravenous, Q2H PRN **OR** morphine (PF) injection 4 mg, 4 mg, Intravenous, Q2H PRN  Allergies:     Allergies   Allergen Reactions    Metformin And Related Diarrhea     Social History:  Reviewed, non contributory    Family History:  Reviewed, non contributory      REVIEW OF SYSTEMS:    12 point ROS has been completed and reviewed    PHYSICAL EXAM:    VITALS:  /73   Pulse 80   Temp 99.3 °F (37.4 °C) (Oral)   Resp 16   Ht 5' 9\" (1.753 m)   Wt 200 lb (90.7 kg)   SpO2 94%   BMI 29.53 kg/m²   H&N: Sclera normal, no masses, trachea midline, no bruit  CVS: Normal rate and rhythm, no murmurs or rubs, peripheral pulses equal, no clubbing or cyanosis. RESP: Breath sounds equal bilateral, few rhonchi. ABDO: Soft, non-tender, bowel sounds active, no organomegaly, no hernias. LYMPH:  No lymphadenopathy. Skin: Warm dry and intact. : No CVAT, normal external genitalia, BPH, normal tone, no masses, no discharge. MSK: Grossly normal for patient  ALEX: Grossly normal for patient  PSY: No acute changes noted in psychosocial assessment. DATA:    CBC:   Lab Results   Component Value Date    WBC 13.0 07/08/2019    RBC 4.03 07/08/2019    HGB 12.2 07/08/2019    HCT 36.4 07/08/2019    MCV 90.5 07/08/2019    MCH 30.2 07/08/2019    MCHC 33.4 07/08/2019    RDW 12.8 07/08/2019     07/08/2019    MPV 7.2 07/08/2019     BMP:    Lab Results   Component Value Date     07/07/2019    K 4.1 07/07/2019    CL 93 07/07/2019    CO2 24 07/07/2019    BUN 17 07/07/2019    LABALBU 3.4 07/07/2019    CREATININE 1.0 07/07/2019    CALCIUM 9.2 07/07/2019    GFRAA >60 07/07/2019    LABGLOM >60 07/07/2019    GLUCOSE 133 07/07/2019     U/A:    Lab Results   Component Value Date    COLORU Yellow 12/05/2014    PROTEINU Negative 12/05/2014    PHUR 5.5 12/05/2014    CLARITYU Clear 12/05/2014    SPECGRAV 1.025 12/05/2014    LEUKOCYTESUR Negative 12/05/2014    UROBILINOGEN 0.2 12/05/2014    BILIRUBINUR Negative 12/05/2014    BLOODU Negative 12/05/2014    GLUCOSEU >=1000 12/05/2014       IMPRESSION/RECOMMENDATIONS:      Retention.   Probable combination of BPH and

## 2019-07-08 NOTE — PROGRESS NOTES
Should patient be NPO at midnight incase of possible surgery? Also, patient is rating pain in RLE 7/10 and only has PRN Tylenol ordered. Is there anything else PRN patient can have for pain? Thanks! Perfect Serve sent to Danielle Lam NP. Will continue to monitor.

## 2019-07-08 NOTE — PROGRESS NOTES
Bedside report given to Minh Johnson. Skin assessment completed. [facility]

## 2019-07-08 NOTE — FLOWSHEET NOTE
Advance Directive referral      07/08/19 5238   Encounter Summary   Services provided to: Patient   Referral/Consult From: Nurse   Support System Family members;Friends/neighbors   Continue Visiting   (8/4 Provided AD info/pt will review and call when ready)   Complexity of Encounter Moderate   Length of Encounter 30 minutes   Advance Directives (For Healthcare)   Advance Directives Documents given;Documents explained

## 2019-07-08 NOTE — H&P
Hospital Medicine History & Physical      PCP: John Alcantar MD, MD    Date of Admission: 7/7/2019    Date of Service: Pt seen/examined on 7/7/19 and Admitted to Inpatient with expected LOS greater than two midnights due to medical therapy. Chief Complaint:  Right foot wound with pus, red, swollen      History Of Present Illness:   48 y.o. male who presented to [facility] with above complaints  Pt with history of diabetes presented to the ER today with complaints of right foot wound that is pouring pus, swelling and pain and redness along with subjective fevers and chills at home, episodes of nausea with vomiting. Patient reports he has a chronic right foot wound, sees is a podiatrist in [place] [address]. He had an MRI of the right foot which was negative for osteomyelitis, also had bone biopsy which showed viable bone, floxacillin twice daily for 10 days and was supposed to see Dr. Cassidy Franks. Patient's ex-wife who is his direct care provider reports that she was taking care of his wound yesterday he had a bulla on the dorsal surface of the foot, which she wiped off with that he developed, some redness and today the wound became inflamed, red with purulent material.  On 6/25/2019 patient had incision and drainage of the right foot wound and had bone biopsy taken pathology showed viable bone with no evidence of acute or chronic osteomyelitis, follow-up MRI on 7 3 was negative for osteo-as well. Patient does report that he fell about a week back and hurt his right foot. Past Medical History:          Diagnosis Date    Diabetes mellitus ([address]     Foot ulcer ([address]     Right foot/diabetic       Past Surgical History:          Procedure Laterality Date    ANKLE SURGERY      ORTHOPEDIC SURGERY      right ankle       Medications Prior to Admission:      Prior to Admission medications    Medication Sig Start Date End Date Taking?  Authorizing Provider   glipiZIDE (GLUCOTROL) 5 MG tablet Take 15 mg by negative for osteo in June 2019  Now presented with new wound with purulence on dorsal surface of the foot, fever, nausea, chills, WBC 13.7  -On IV Unasyn, blood cultures x2  -Podiatry consulted, appreciate Recs  -Wound culture  -ID consulted by podiatry to r/o osteomyelitis  -Vascular work-up  -MRI right foot    DM2  Last A1c in May 2019 was 6.7  -Continue sliding scale insulin with Accu-Cheks, hypoglycemia protocol    DVT Prophylaxis: lmwh  Diet: Diet NPO Time Specified  Code Status: Full Code    PT/OT Eval Status: Not consulted yet    Carlton Lang MD    Thank you Jus Pavon MD, MD for the opportunity to be involved in this patient's care. If you have any questions or concerns please feel free to contact me at [phone].

## 2019-07-08 NOTE — PROGRESS NOTES
on 7/7 with plans for repeat I& D in OR on 7/8. Continue empiric abx while awaiting cultures. ID consulted. Vascular work-up and MRI right foot pending      DM2: Last A1c in May 2019 was 6.7.  Continue SSI     DVT Prophylaxis: lovenox       Diet: Diet NPO Effective Now  Code Status: Full Code    PT/OT Eval Status: not indicated at this time       Dispo - hard to say, pending clinical course     Leo Limon MD

## 2019-07-08 NOTE — PROGRESS NOTES
Patient admitted from OR to PACU. Bedside report received. Patient immediately hooked up to heart monitor. Chon George

## 2019-07-08 NOTE — ANESTHESIA POSTPROCEDURE EVALUATION
Department of Anesthesiology  Postprocedure Note    Patient: Jus George  MRN: [id number]  YOB: 1965  Date of evaluation: 7/8/2019    Procedure Summary     Date:  07/08/19 Room / Location:  [facility] OR 06 / [facility] OR    Anesthesia Start:  1618 Anesthesia Stop:  0869    Procedure:  incision and drainage right foot with possible partial 5th ray amputation (Right Foot) Diagnosis:  (right foot infection)    Surgeon:  Pratima Pa DPM Responsible Provider:  Breanna Kebede MD    Anesthesia Type:  general ASA Status:  3 - Emergent     Anesthesia Type: general    Gary Phase I: Gary Score: 10    Gary Phase II: Gary Score: 10    Last vitals: Reviewed and per EMR flowsheets.      Anesthesia Post Evaluation   Anesthetic Problems: no   Cardiovascular System Stable: yes  Respiratory Function: Airway Patent yes  ETT no  Ventilator no  Level of consciousness: awake, alert and oriented  Post-op pain: adequate analgesia  Hydration Adequate: yes  Nausea/Vomiting:no  Other Issues:     Misael Minor MD

## 2019-07-08 NOTE — PROGRESS NOTES
Patient admitted 7/7/19 with right foot diabetic wound infection. Pt. is sepsis protocol, vitals stable. Pt. alert and oriented x-4. Pt. unable to void since arriving to ED. Pt. bladder scanned at 0327 for over 1,000 mL of urine in bladder. RN attempted to straight cath patient with smallest FR catheter. RN met resistance when attempting to straight cath patient. Pt. still unable to void. Pt. states no prior problems with prostate or not being able to urinate on own. Thanks! Consult has been called to Urology (Dr. Aster Love) on 7/8/19 at [phone].    Rina Cao  7/8/2019

## 2019-07-08 NOTE — ANESTHESIA PROCEDURE NOTES
Peripheral Block    Patient location during procedure: OR  Start time: 7/8/2019 4:17 PM  End time: 7/8/2019 4:22 PM  Staffing  Anesthesiologist: Clarence Ying MD  Performed: anesthesiologist   Preanesthetic Checklist  Completed: patient identified, site marked, surgical consent, pre-op evaluation, timeout performed, IV checked, risks and benefits discussed, monitors and equipment checked, anesthesia consent given, oxygen available and patient being monitored  Peripheral Block  Patient position: supine  Prep: ChloraPrep  Patient monitoring: cardiac monitor, continuous pulse ox, continuous capnometry, frequent blood pressure checks and IV access  Block type: Sciatic  Laterality: right  Injection technique: single-shot  Procedures: ultrasound guided  Popliteal  Provider prep: sterile gloves  Needle  Needle gauge: 21 G  Needle length: 10 cm  Needle localization: ultrasound guidance  Test dose: negative  Assessment  Injection assessment: negative aspiration for heme, no paresthesia on injection and local visualized surrounding nerve on ultrasound  Slow fractionated injection: yes  Hemodynamics: stable  Additional Notes  Pt. agrees to risks, benefits and alternatives to block  Block performed at the request of the surgeon for post-operative pain management   Immediately prior to procedure a \"time out\" was called to verify the correct patient, procedure, equipment, support staff. Site/side marked as required  Side: right  Site/Approach: lateral thigh 5-10 cm superior to the PF crease  Position: supine with leg elevated on blankets  Sedation: GA  Local Anesthetic Dose: None  Aseptic technique: prepped with chlorhexidine  Ultrasound:   yes- see attached image   Local Anesthetic: 0.25%  Bupivacaine   Amount:  30 ml in 5 ml increments after negative aspiration each time. Easy injection w/o resistance and w/o pain/paresthesias. Pt tolerated procedure well. No complications.   Reason for block: post-op pain management

## 2019-07-08 NOTE — PROGRESS NOTES
4 Eyes Skin Assessment     The patient is being assess for   Admission    I agree that 2 RN's have performed a thorough Head to Toe Skin Assessment on the patient. ALL assessment sites listed below have been assessed. Areas assessed by both nurses:   [x]   Head, Face, and Ears   [x]   Shoulders, Back, and Chest, Abdomen  [x]   Arms, Elbows, and Hands   [x]   Coccyx, Sacrum, and Ischium  [x]   Legs, Feet, and Heels            **SHARE this note so that the co-signing nurse is able to place an eSignature**    Co-signer eSignature: Electronically signed by Nile Wilburn RN on 7/7/19 at 9:38 PM    Does the Patient have Skin Breakdown?   Yes LDA WOUND CARE was Initiated documentation include the Trupti-wound, Wound Assessment, Measurements, Dressing Treatment, Drainage, and Color\",          [place] Prevention initiated:  Yes   Wound Care Orders initiated:  Yes      [address]  nurse consulted for Pressure Injury (Stage 3,4, Unstageable, DTI, NWPT, Complex wounds)and New or Established Ostomies:  Yes      Primary Nurse eSignature: Electronically signed by Aydin Rivera RN on 7/7/19 at 9:36 PM

## 2019-07-09 PROBLEM — E44.0 MODERATE MALNUTRITION (HCC): Status: ACTIVE | Noted: 2019-07-09

## 2019-07-09 LAB
ANION GAP SERPL CALCULATED.3IONS-SCNC: 11 MMOL/L (ref 3–16)
BASOPHILS ABSOLUTE: 0 K/UL (ref 0–0.2)
BASOPHILS RELATIVE PERCENT: 0.2 %
BUN BLDV-MCNC: 16 MG/DL (ref 7–20)
CALCIUM SERPL-MCNC: 8.5 MG/DL (ref 8.3–10.6)
CHLORIDE BLD-SCNC: 100 MMOL/L (ref 99–110)
CO2: 23 MMOL/L (ref 21–32)
CREAT SERPL-MCNC: 0.8 MG/DL (ref 0.9–1.3)
EOSINOPHILS ABSOLUTE: 0 K/UL (ref 0–0.6)
EOSINOPHILS RELATIVE PERCENT: 0 %
GFR AFRICAN AMERICAN: >60
GFR NON-AFRICAN AMERICAN: >60
GLUCOSE BLD-MCNC: 212 MG/DL (ref 70–99)
GLUCOSE BLD-MCNC: 241 MG/DL (ref 70–99)
GLUCOSE BLD-MCNC: 242 MG/DL (ref 70–99)
GLUCOSE BLD-MCNC: 261 MG/DL (ref 70–99)
GLUCOSE BLD-MCNC: 326 MG/DL (ref 70–99)
HCT VFR BLD CALC: 32.2 % (ref 40.5–52.5)
HEMOGLOBIN: 10.9 G/DL (ref 13.5–17.5)
LYMPHOCYTES ABSOLUTE: 1 K/UL (ref 1–5.1)
LYMPHOCYTES RELATIVE PERCENT: 8 %
MCH RBC QN AUTO: 30.2 PG (ref 26–34)
MCHC RBC AUTO-ENTMCNC: 34 G/DL (ref 31–36)
MCV RBC AUTO: 88.7 FL (ref 80–100)
MONOCYTES ABSOLUTE: 0.5 K/UL (ref 0–1.3)
MONOCYTES RELATIVE PERCENT: 4.1 %
MRSA SCREEN RT-PCR: NORMAL
NEUTROPHILS ABSOLUTE: 10.4 K/UL (ref 1.7–7.7)
NEUTROPHILS RELATIVE PERCENT: 87.7 %
PDW BLD-RTO: 12.3 % (ref 12.4–15.4)
PERFORMED ON: ABNORMAL
PLATELET # BLD: 279 K/UL (ref 135–450)
PMV BLD AUTO: 7.3 FL (ref 5–10.5)
POTASSIUM SERPL-SCNC: 4.6 MMOL/L (ref 3.5–5.1)
RBC # BLD: 3.62 M/UL (ref 4.2–5.9)
SODIUM BLD-SCNC: 134 MMOL/L (ref 136–145)
WBC # BLD: 11.8 K/UL (ref 4–11)

## 2019-07-09 PROCEDURE — 2580000003 HC RX 258: Performed by: PODIATRIST

## 2019-07-09 PROCEDURE — 80048 BASIC METABOLIC PNL TOTAL CA: CPT

## 2019-07-09 PROCEDURE — 2580000003 HC RX 258: Performed by: INTERNAL MEDICINE

## 2019-07-09 PROCEDURE — 36415 COLL VENOUS BLD VENIPUNCTURE: CPT

## 2019-07-09 PROCEDURE — 6370000000 HC RX 637 (ALT 250 FOR IP): Performed by: PODIATRIST

## 2019-07-09 PROCEDURE — 85025 COMPLETE CBC W/AUTO DIFF WBC: CPT

## 2019-07-09 PROCEDURE — 93923 UPR/LXTR ART STDY 3+ LVLS: CPT

## 2019-07-09 PROCEDURE — 6360000002 HC RX W HCPCS: Performed by: INTERNAL MEDICINE

## 2019-07-09 PROCEDURE — 6360000002 HC RX W HCPCS: Performed by: PODIATRIST

## 2019-07-09 PROCEDURE — 1200000000 HC SEMI PRIVATE

## 2019-07-09 PROCEDURE — 6370000000 HC RX 637 (ALT 250 FOR IP): Performed by: NURSE PRACTITIONER

## 2019-07-09 RX ORDER — HYDROCODONE BITARTRATE AND ACETAMINOPHEN 5; 325 MG/1; MG/1
1 TABLET ORAL EVERY 4 HOURS PRN
Status: DISCONTINUED | OUTPATIENT
Start: 2019-07-09 | End: 2019-07-15 | Stop reason: HOSPADM

## 2019-07-09 RX ADMIN — PIPERACILLIN SODIUM,TAZOBACTAM SODIUM 3.38 G: 3; .375 INJECTION, POWDER, FOR SOLUTION INTRAVENOUS at 04:17

## 2019-07-09 RX ADMIN — HYDROCODONE BITARTRATE AND ACETAMINOPHEN 1 TABLET: 5; 325 TABLET ORAL at 04:17

## 2019-07-09 RX ADMIN — INSULIN LISPRO 2 UNITS: 100 INJECTION, SOLUTION INTRAVENOUS; SUBCUTANEOUS at 09:41

## 2019-07-09 RX ADMIN — INSULIN LISPRO 1 UNITS: 100 INJECTION, SOLUTION INTRAVENOUS; SUBCUTANEOUS at 21:14

## 2019-07-09 RX ADMIN — INSULIN LISPRO 3 UNITS: 100 INJECTION, SOLUTION INTRAVENOUS; SUBCUTANEOUS at 17:41

## 2019-07-09 RX ADMIN — LINEZOLID 600 MG: 600 INJECTION, SOLUTION INTRAVENOUS at 09:41

## 2019-07-09 RX ADMIN — Medication 10 ML: at 09:41

## 2019-07-09 RX ADMIN — MORPHINE SULFATE 4 MG: 2 INJECTION, SOLUTION INTRAMUSCULAR; INTRAVENOUS at 02:26

## 2019-07-09 RX ADMIN — INSULIN LISPRO 4 UNITS: 100 INJECTION, SOLUTION INTRAVENOUS; SUBCUTANEOUS at 11:58

## 2019-07-09 RX ADMIN — ENOXAPARIN SODIUM 40 MG: 40 INJECTION SUBCUTANEOUS at 09:41

## 2019-07-09 RX ADMIN — ACETAMINOPHEN 650 MG: 325 TABLET ORAL at 21:10

## 2019-07-09 RX ADMIN — PIPERACILLIN SODIUM,TAZOBACTAM SODIUM 3.38 G: 3; .375 INJECTION, POWDER, FOR SOLUTION INTRAVENOUS at 12:12

## 2019-07-09 RX ADMIN — LINEZOLID 600 MG: 600 INJECTION, SOLUTION INTRAVENOUS at 21:09

## 2019-07-09 RX ADMIN — FINASTERIDE 5 MG: 5 TABLET, FILM COATED ORAL at 09:41

## 2019-07-09 RX ADMIN — TAMSULOSIN HYDROCHLORIDE 0.4 MG: 0.4 CAPSULE ORAL at 09:40

## 2019-07-09 RX ADMIN — MORPHINE SULFATE 4 MG: 2 INJECTION, SOLUTION INTRAMUSCULAR; INTRAVENOUS at 21:10

## 2019-07-09 RX ADMIN — CYCLOBENZAPRINE HYDROCHLORIDE 10 MG: 10 TABLET, FILM COATED ORAL at 21:10

## 2019-07-09 RX ADMIN — HYDROCODONE BITARTRATE AND ACETAMINOPHEN 1 TABLET: 5; 325 TABLET ORAL at 14:23

## 2019-07-09 RX ADMIN — Medication 10 ML: at 21:10

## 2019-07-09 ASSESSMENT — PAIN DESCRIPTION - LOCATION
LOCATION: FOOT

## 2019-07-09 ASSESSMENT — ENCOUNTER SYMPTOMS
RHINORRHEA: 0
CONSTIPATION: 0
ABDOMINAL PAIN: 0
DIARRHEA: 0
SHORTNESS OF BREATH: 0
NAUSEA: 0
EYE DISCHARGE: 0
TROUBLE SWALLOWING: 0
WHEEZING: 0
COUGH: 0
SORE THROAT: 0
EYE REDNESS: 0
BACK PAIN: 0

## 2019-07-09 ASSESSMENT — PAIN DESCRIPTION - ORIENTATION
ORIENTATION: RIGHT

## 2019-07-09 ASSESSMENT — PAIN DESCRIPTION - PAIN TYPE
TYPE: SURGICAL PAIN

## 2019-07-09 ASSESSMENT — PAIN SCALES - GENERAL
PAINLEVEL_OUTOF10: 8
PAINLEVEL_OUTOF10: 8
PAINLEVEL_OUTOF10: 9
PAINLEVEL_OUTOF10: 3
PAINLEVEL_OUTOF10: 10
PAINLEVEL_OUTOF10: 8
PAINLEVEL_OUTOF10: 8
PAINLEVEL_OUTOF10: 9

## 2019-07-09 NOTE — PROGRESS NOTES
skin. Rt foot surgical dressing in place  Neurologic:  Alert, speech clear with no overt facial droop  Psychiatric: Alert and oriented, thought content appropriate, normal insight  Capillary Refill: Brisk,< 3 seconds   Peripheral Pulses: +2 palpable, equal bilaterally       Labs:   Recent Labs     07/07/19  1802 07/08/19  0632 07/09/19  0544   WBC 13.7* 13.0* 11.8*   HGB 13.7 12.2* 10.9*   HCT 39.9* 36.4* 32.2*    313 279     Recent Labs     07/07/19  1802 07/08/19  0632 07/09/19  0544   * 137 134*   K 4.1 4.3 4.6   CL 93* 101 100   CO2 24 26 23   BUN 17 17 16   CREATININE 1.0 1.1 0.8*   CALCIUM 9.2 8.7 8.5     Recent Labs     07/07/19  1802   AST 28   ALT 21   BILITOT 0.6   ALKPHOS 126     No results for input(s): INR in the last 72 hours. No results for input(s): Joanne Meghan in the last 72 hours. Urinalysis:      Lab Results   Component Value Date    NITRU Negative 07/08/2019    WBCUA 0-2 07/08/2019    RBCUA 10-20 07/08/2019    BLOODU SMALL 07/08/2019    SPECGRAV <=1.005 07/08/2019    GLUCOSEU Negative 07/08/2019       Radiology:  XR FOOT RIGHT (2 VIEWS)   Final Result   Expected postsurgical changes from amputation of the 5th ray proximally at   the 5th metatarsal.         XR FOOT RIGHT (MIN 3 VIEWS)   Final Result   1. Age-indeterminate, possibly acute 5th metatarsal neck fracture, mildly   displaced. 2. Wound plantar aspect of the lateral portion right forefoot superficial to   the 5th metatarsal head with bone erosion at this level concerning for   osteomyelitis. MRI correlation recommended. 3. Diffuse edema lateral aspect of the right forefoot possibly related to   cellulitis. 4. Osteoarthritis. 5.  Vascular calcifications are noted reflecting calcific atherosclerosis.          MRI FOOT RIGHT W WO CONTRAST    (Results Pending)   VL DUP LOWER EXTREMITY ARTERIES BILATERAL    (Results Pending)           Assessment/Plan:    Active Hospital Problems    Diagnosis    Moderate

## 2019-07-10 LAB
ANION GAP SERPL CALCULATED.3IONS-SCNC: 8 MMOL/L (ref 3–16)
BASOPHILS ABSOLUTE: 0.1 K/UL (ref 0–0.2)
BASOPHILS RELATIVE PERCENT: 0.5 %
BUN BLDV-MCNC: 14 MG/DL (ref 7–20)
CALCIUM SERPL-MCNC: 8.9 MG/DL (ref 8.3–10.6)
CHLORIDE BLD-SCNC: 98 MMOL/L (ref 99–110)
CO2: 28 MMOL/L (ref 21–32)
CREAT SERPL-MCNC: 0.9 MG/DL (ref 0.9–1.3)
EOSINOPHILS ABSOLUTE: 0 K/UL (ref 0–0.6)
EOSINOPHILS RELATIVE PERCENT: 0.4 %
GFR AFRICAN AMERICAN: >60
GFR NON-AFRICAN AMERICAN: >60
GLUCOSE BLD-MCNC: 174 MG/DL (ref 70–99)
GLUCOSE BLD-MCNC: 208 MG/DL (ref 70–99)
GLUCOSE BLD-MCNC: 225 MG/DL (ref 70–99)
GLUCOSE BLD-MCNC: 267 MG/DL (ref 70–99)
GLUCOSE BLD-MCNC: 270 MG/DL (ref 70–99)
GRAM STAIN RESULT: ABNORMAL
HCT VFR BLD CALC: 35 % (ref 40.5–52.5)
HEMOGLOBIN: 11.9 G/DL (ref 13.5–17.5)
LYMPHOCYTES ABSOLUTE: 2.3 K/UL (ref 1–5.1)
LYMPHOCYTES RELATIVE PERCENT: 22.5 %
MCH RBC QN AUTO: 30.2 PG (ref 26–34)
MCHC RBC AUTO-ENTMCNC: 34 G/DL (ref 31–36)
MCV RBC AUTO: 88.7 FL (ref 80–100)
MONOCYTES ABSOLUTE: 0.6 K/UL (ref 0–1.3)
MONOCYTES RELATIVE PERCENT: 6.1 %
NEUTROPHILS ABSOLUTE: 7.2 K/UL (ref 1.7–7.7)
NEUTROPHILS RELATIVE PERCENT: 70.5 %
ORGANISM: ABNORMAL
PDW BLD-RTO: 12.9 % (ref 12.4–15.4)
PERFORMED ON: ABNORMAL
PLATELET # BLD: 317 K/UL (ref 135–450)
PMV BLD AUTO: 7.1 FL (ref 5–10.5)
POTASSIUM SERPL-SCNC: 4.6 MMOL/L (ref 3.5–5.1)
RBC # BLD: 3.95 M/UL (ref 4.2–5.9)
SODIUM BLD-SCNC: 134 MMOL/L (ref 136–145)
WBC # BLD: 10.2 K/UL (ref 4–11)
WOUND/ABSCESS: ABNORMAL
WOUND/ABSCESS: ABNORMAL

## 2019-07-10 PROCEDURE — 85025 COMPLETE CBC W/AUTO DIFF WBC: CPT

## 2019-07-10 PROCEDURE — 80048 BASIC METABOLIC PNL TOTAL CA: CPT

## 2019-07-10 PROCEDURE — 6370000000 HC RX 637 (ALT 250 FOR IP): Performed by: PODIATRIST

## 2019-07-10 PROCEDURE — 6360000002 HC RX W HCPCS: Performed by: INTERNAL MEDICINE

## 2019-07-10 PROCEDURE — 6370000000 HC RX 637 (ALT 250 FOR IP): Performed by: INTERNAL MEDICINE

## 2019-07-10 PROCEDURE — 99233 SBSQ HOSP IP/OBS HIGH 50: CPT | Performed by: INTERNAL MEDICINE

## 2019-07-10 PROCEDURE — 6360000002 HC RX W HCPCS: Performed by: PODIATRIST

## 2019-07-10 PROCEDURE — 6370000000 HC RX 637 (ALT 250 FOR IP): Performed by: NURSE PRACTITIONER

## 2019-07-10 PROCEDURE — 36415 COLL VENOUS BLD VENIPUNCTURE: CPT

## 2019-07-10 PROCEDURE — 1200000000 HC SEMI PRIVATE

## 2019-07-10 PROCEDURE — 2580000003 HC RX 258: Performed by: INTERNAL MEDICINE

## 2019-07-10 RX ORDER — SODIUM CHLORIDE 0.9 % (FLUSH) 0.9 %
10 SYRINGE (ML) INJECTION EVERY 12 HOURS SCHEDULED
Status: DISCONTINUED | OUTPATIENT
Start: 2019-07-10 | End: 2019-07-15 | Stop reason: HOSPADM

## 2019-07-10 RX ORDER — SODIUM CHLORIDE 0.9 % (FLUSH) 0.9 %
10 SYRINGE (ML) INJECTION PRN
Status: DISCONTINUED | OUTPATIENT
Start: 2019-07-10 | End: 2019-07-15 | Stop reason: HOSPADM

## 2019-07-10 RX ORDER — LIDOCAINE HYDROCHLORIDE 10 MG/ML
5 INJECTION, SOLUTION INFILTRATION; PERINEURAL ONCE
Status: COMPLETED | OUTPATIENT
Start: 2019-07-10 | End: 2019-07-11

## 2019-07-10 RX ORDER — POLYETHYLENE GLYCOL 3350 17 G/17G
17 POWDER, FOR SOLUTION ORAL DAILY
Status: DISCONTINUED | OUTPATIENT
Start: 2019-07-10 | End: 2019-07-15 | Stop reason: HOSPADM

## 2019-07-10 RX ADMIN — PIPERACILLIN SODIUM,TAZOBACTAM SODIUM 3.38 G: 3; .375 INJECTION, POWDER, FOR SOLUTION INTRAVENOUS at 01:28

## 2019-07-10 RX ADMIN — INSULIN LISPRO 4 UNITS: 100 INJECTION, SOLUTION INTRAVENOUS; SUBCUTANEOUS at 17:47

## 2019-07-10 RX ADMIN — ENOXAPARIN SODIUM 40 MG: 40 INJECTION SUBCUTANEOUS at 08:32

## 2019-07-10 RX ADMIN — POLYETHYLENE GLYCOL (3350) 17 G: 17 POWDER, FOR SOLUTION ORAL at 19:24

## 2019-07-10 RX ADMIN — FINASTERIDE 5 MG: 5 TABLET, FILM COATED ORAL at 08:32

## 2019-07-10 RX ADMIN — INSULIN LISPRO 1 UNITS: 100 INJECTION, SOLUTION INTRAVENOUS; SUBCUTANEOUS at 22:22

## 2019-07-10 RX ADMIN — Medication 2 G: at 17:47

## 2019-07-10 RX ADMIN — INSULIN LISPRO 3 UNITS: 100 INJECTION, SOLUTION INTRAVENOUS; SUBCUTANEOUS at 12:32

## 2019-07-10 RX ADMIN — HYDROCODONE BITARTRATE AND ACETAMINOPHEN 1 TABLET: 5; 325 TABLET ORAL at 01:37

## 2019-07-10 RX ADMIN — INSULIN LISPRO 2 UNITS: 100 INJECTION, SOLUTION INTRAVENOUS; SUBCUTANEOUS at 08:31

## 2019-07-10 RX ADMIN — LINEZOLID 600 MG: 600 INJECTION, SOLUTION INTRAVENOUS at 08:32

## 2019-07-10 RX ADMIN — PIPERACILLIN SODIUM,TAZOBACTAM SODIUM 3.38 G: 3; .375 INJECTION, POWDER, FOR SOLUTION INTRAVENOUS at 09:44

## 2019-07-10 RX ADMIN — TAMSULOSIN HYDROCHLORIDE 0.4 MG: 0.4 CAPSULE ORAL at 08:32

## 2019-07-10 ASSESSMENT — ENCOUNTER SYMPTOMS
COUGH: 0
NAUSEA: 0
WHEEZING: 0
SORE THROAT: 0
DIARRHEA: 0
EYE REDNESS: 0
SHORTNESS OF BREATH: 0
TROUBLE SWALLOWING: 0
ABDOMINAL PAIN: 0
CONSTIPATION: 0
RHINORRHEA: 0
EYE DISCHARGE: 0
BACK PAIN: 0

## 2019-07-10 ASSESSMENT — PAIN SCALES - GENERAL
PAINLEVEL_OUTOF10: 0
PAINLEVEL_OUTOF10: 6
PAINLEVEL_OUTOF10: 7
PAINLEVEL_OUTOF10: 0

## 2019-07-10 NOTE — PLAN OF CARE
Problem: Falls - Risk of:  Goal: Will remain free from falls  Description  Will remain free from falls  Outcome: Ongoing     Problem: Pain:  Goal: Pain level will decrease  Description  Pain level will decrease  Outcome: Ongoing     Problem: Nutrition  Goal: Optimal nutrition therapy  7/9/2019 0921 by Inge Chawla RD, AILEEN  Outcome: Ongoing  Note:   Nutrition Problem: Inadequate oral intake  Intervention: Food and/or Nutrient Delivery: Continue current diet, Start ONS  Nutritional Goals: Pt will have po intakes 50% or greater this admission
Problem: Falls - Risk of:  Goal: Will remain free from falls  Description  Will remain free from falls  Outcome: Ongoing  Pt. Has non-skid socks on, call light in reach, side rails up x-2, bed alarm on, patient instructed to use call light with concerns and questions. Pt. Alert and oriented x-4. Pt. Able to turn self in bed side to side as tolerated. Pt. X-1 assist with stedy. Will continue to monitor.   Goal: Absence of physical injury  Description  Absence of physical injury  Outcome: Ongoing
and bad fats, meal planning and supplements. Different diabetic medications. Managing high and low sugar readings. Rotation of sites for subcutaneous medication injection. Problem: Skin Integrity:  Goal: Demonstration of wound healing without infection will improve  Description  Demonstration of wound healing without infection will improve  Outcome: Ongoing  Note:   Pt's surgical site will be free from s/s of infection this shift.

## 2019-07-10 NOTE — PROGRESS NOTES
Infectious Diseases   Progress Note      Admission Date: 7/7/2019  Hospital Day: Hospital Day: 4  Attending: Rama Dickens MD  Date of service: 7/10/2019    Chief complaint/ Reason for consult: The patient was seen today for the following:    · High fever and leukocytosis  · Complicated right diabetic foot infection   · Failure of outpatient antibiotics  · Elevated sed rate and CRP    Microbiology:        I have reviewed all available micro lab data and cultures    · Blood culture (2/2) - collected on 7/7/2019: *Negative  · Right foot surgical culture  - collected on 7/8/2019: MSSA      Staphylococcus aureus (1)     Antibiotic Interpretation HUMERA Status    benzylpenicillin Resistant >=0.5 mcg/mL     ciprofloxacin Sensitive 1 mcg/mL     clindamycin Sensitive <=0.25 mcg/mL     erythromycin Sensitive <=0.25 mcg/mL     oxacillin Sensitive 0.5 mcg/mL     tetracycline Sensitive <=1 mcg/mL     trimethoprim-sulfamethoxazole Sensitive <=10 mcg/mL           Antibiotics and immunizations:       Current antibiotics: All antibiotics and their doses were reviewed by me    Recent Abx Admin                   piperacillin-tazobactam (ZOSYN) 3.375 g in dextrose 5 % 50 mL IVPB extended infusion (mini-bag) (g) 3.375 g New Bag 07/10/19 0944     3.375 g New Bag  0128    linezolid (ZYVOX) IVPB 600 mg (mg) 600 mg New Bag 07/10/19 0832     600 mg New Bag 07/09/19 2109                  Immunization History: All immunization history was reviewed by me today. There is no immunization history on file for this patient. Known drug allergies: All allergies were reviewed and updated    Allergies   Allergen Reactions    Metformin And Related Diarrhea         Assessment:     The patient is a 48 y.o. old male who  has a past medical history of Diabetes mellitus ([facility] and Foot ulcer ([facility] [address]  with following problems:    · High fever and leukocytosis-resolved  · Complicated right diabetic foot infection-culture growing MSSA  · Failure highlighted. TIME SPENT TODAY:     - Spent over  36  minutes on visit (including interval history, physical exam, review of data including labs, cultures, imaging, development and implementation of treatment plan and coordination of complex care). - Over 50% of time spent with patient face to face on counseling and education. Thank you for involving me in the care of your patient. I will continue to follow. If you have any additional questions, please do not hesitate to contact me. Subjective: Interval history: Patient was seen and examined at bedside. Interval history was obtained. The patient feels tired. He is a surgical dressing placed on the right foot. He is tolerating antibiotics okay. No diarrhea. REVIEW OF SYSTEMS:  *    Review of Systems   Constitutional: Negative for chills, diaphoresis and fever. HENT: Negative for ear discharge, ear pain, rhinorrhea, sore throat and trouble swallowing. Eyes: Negative for discharge and redness. Respiratory: Negative for cough, shortness of breath and wheezing. Cardiovascular: Negative for chest pain and leg swelling. Gastrointestinal: Negative for abdominal pain, constipation, diarrhea and nausea. Endocrine: Negative for polyuria. Genitourinary: Negative for dysuria, flank pain, frequency, hematuria and urgency. Musculoskeletal: Positive for arthralgias (Postop pain in the right foot). Negative for back pain and myalgias. Skin: Negative for rash. Neurological: Negative for dizziness, seizures and headaches. Hematological: Does not bruise/bleed easily. Psychiatric/Behavioral: Negative for hallucinations and suicidal ideas. All other systems reviewed and are negative. Past Medical History: All past medical history reviewed today.     Past Medical History:   Diagnosis Date    Diabetes mellitus ([facility]     Foot ulcer ([facility]     Right foot/diabetic       Past Surgical History: All past surgical history was reviewed

## 2019-07-10 NOTE — DISCHARGE INSTR - COC
mellitus (HCC) E11.42    Moderate malnutrition (HCC) E44.0       Isolation/Infection:   Isolation          No Isolation            Nurse Assessment:  Last Vital Signs: /80   Pulse 81   Temp 97.7 °F (36.5 °C) (Oral)   Resp 16   Ht 5' 9\" (1.753 m)   Wt 200 lb (90.7 kg)   SpO2 96%   BMI 29.53 kg/m²     Last documented pain score (0-10 scale): Pain Level: 0  Last Weight:   Wt Readings from Last 1 Encounters:   07/07/19 200 lb (90.7 kg)     Mental Status:  oriented and alert    IV Access:  - PICC - site  R Basilic, insertion date:  7/11/19    Nursing Mobility/ADLs:  Walking   Assisted  Transfer  Assisted  Bathing  Assisted  Dressing  Assisted  Toileting  Assisted  Feeding  Independent  Med Admin  Independent  Med Delivery   whole    Wound Care Documentation and Therapy:  Wound 07/07/19 Foot Right;Posterior; Other (Comment) open, swollen, red, wound started 2 2/12 months ago per pt. (Active)   Wound Diabetic 7/7/2019  9:00 PM   Dressing Status Clean;Dry; Intact 7/8/2019  7:41 AM   Dressing Changed Changed/New 7/8/2019  7:41 AM   Dressing/Treatment ABD; Ace Wrap 7/8/2019  7:41 AM   Wound Cleansed Not Cleansed 7/7/2019  9:00 PM   Wound Length (cm) 1.2 cm 7/7/2019  9:00 PM   Wound Width (cm) 1.2 cm 7/7/2019  9:00 PM   Wound Depth (cm) 0.3 cm 7/7/2019  9:00 PM   Wound Surface Area (cm^2) 1.44 cm^2 7/7/2019  9:00 PM   Wound Volume (cm^3) 0.43 cm^3 7/7/2019  9:00 PM   Wound Assessment Red; Swelling 7/8/2019  7:41 AM   Drainage Amount None 7/8/2019  7:41 AM   Odor Mild 7/8/2019  7:41 AM   Trupti-wound Assessment Red; Swelling 7/8/2019  7:41 AM   Culture Taken No 7/7/2019  9:00 PM   Number of days: 2       Wound 07/07/19 Foot Right; Anterior; Outer redness, yellow drainage- started 7/6/19 per pt. (Active)   Wound Diabetic 7/7/2019  9:00 PM   Dressing Status Clean;Dry; Intact 7/8/2019  7:41 AM   Dressing Changed Changed/New 7/8/2019  7:41 AM   Dressing/Treatment ABD; Ace Wrap 7/8/2019  7:41 AM   Wound Cleansed Not Cleansed SECTION    Inpatient Status Date: ***    Readmission Risk Assessment Score:  Readmission Risk              Risk of Unplanned Readmission:        9           Discharging to Facility/ Agency   · Name:   · Address:  · Phone:  · Fax:    Dialysis Facility (if applicable)   · Name:  · Address:  · Dialysis Schedule:  · Phone:  · Fax:    / signature: {[email or web address]:[id number]}    PHYSICIAN SECTION    Prognosis: Good    Condition at Discharge: Stable    Rehab Potential (if transferring to Rehab): Good    Recommended Labs or Other Treatments After Discharge:     Podiatry  -Keep dressing intact. Do not get wet. Please wear surgical shoe when not at rest  -Do not place any weight on your right foot  -Follow up with Dr. Kvng Marrufo 7/17/19. Please call [phone] for an appointment                          Out-patient antibiotic therapy (OPAT)/ Antibiotic Infusion orders          Diagnosis: Right diabetic foot infection with osteomyelitis       Organism/ culture: *MSSA     Name and dose of Antimicrobial: IV ertapenem 1g IV q24     Antimicrobial start date: calculated from 7/8/19     Antimicrobial completion date planned: 8/19/19     Lab monitoring: CBC, Chem 12, ESR, CRP once a week, to be       collected every Monday morning until the patient is off IV antibiotics. Fax weekly lab results to Dr. Ani Harvey office at [phone]. Please maintain PICC line until the patient is on IV antibiotics. Routine PICC care             Outpatient Follow up: No outpatient ID f/u needed if continues to do well. Physician Signature:  Luis Kwan MD           Physician Certification: I certify the above information and transfer of Ashvin Grady  is necessary for the continuing treatment of the diagnosis listed and that he requires [address] for less 30 days.      Update Admission H&P: No change in H&P    PHYSICIAN SIGNATURE:  Electronically signed by Charles Hawkins MD on 7/15/19 at 9:56 AM

## 2019-07-10 NOTE — PROGRESS NOTES
Shift assessment and VS completed, see flowsheet. Pt A&O x4. Ace wrap in place to RLE. Pt aware of nonweight bearing status to R foot. Pulses palpable in BLE. Pt denies any pain at this time. Meds given, see MAR. Bed alarm on for safety, 2/4 side rails up, bed wheels locked, bed in lowest position. Call light and bedside table in reach. Wife at bedside. Pt denies any further needs at this time.

## 2019-07-11 ENCOUNTER — APPOINTMENT (OUTPATIENT)
Dept: GENERAL RADIOLOGY | Age: 54
DRG: 854 | End: 2019-07-11
Payer: COMMERCIAL

## 2019-07-11 ENCOUNTER — APPOINTMENT (OUTPATIENT)
Dept: INTERVENTIONAL RADIOLOGY/VASCULAR | Age: 54
DRG: 854 | End: 2019-07-11
Payer: COMMERCIAL

## 2019-07-11 ENCOUNTER — ANESTHESIA (OUTPATIENT)
Dept: OPERATING ROOM | Age: 54
DRG: 854 | End: 2019-07-11
Payer: COMMERCIAL

## 2019-07-11 ENCOUNTER — ANESTHESIA EVENT (OUTPATIENT)
Dept: OPERATING ROOM | Age: 54
DRG: 854 | End: 2019-07-11
Payer: COMMERCIAL

## 2019-07-11 VITALS — DIASTOLIC BLOOD PRESSURE: 69 MMHG | OXYGEN SATURATION: 100 % | SYSTOLIC BLOOD PRESSURE: 97 MMHG

## 2019-07-11 LAB
ANION GAP SERPL CALCULATED.3IONS-SCNC: 8 MMOL/L (ref 3–16)
BASOPHILS ABSOLUTE: 0.1 K/UL (ref 0–0.2)
BASOPHILS RELATIVE PERCENT: 0.7 %
BUN BLDV-MCNC: 15 MG/DL (ref 7–20)
CALCIUM SERPL-MCNC: 8.9 MG/DL (ref 8.3–10.6)
CHLORIDE BLD-SCNC: 103 MMOL/L (ref 99–110)
CO2: 28 MMOL/L (ref 21–32)
CREAT SERPL-MCNC: 0.9 MG/DL (ref 0.9–1.3)
EOSINOPHILS ABSOLUTE: 0.1 K/UL (ref 0–0.6)
EOSINOPHILS RELATIVE PERCENT: 0.6 %
GFR AFRICAN AMERICAN: >60
GFR NON-AFRICAN AMERICAN: >60
GLUCOSE BLD-MCNC: 151 MG/DL (ref 70–99)
GLUCOSE BLD-MCNC: 157 MG/DL (ref 70–99)
GLUCOSE BLD-MCNC: 178 MG/DL (ref 70–99)
GLUCOSE BLD-MCNC: 185 MG/DL (ref 70–99)
GLUCOSE BLD-MCNC: 238 MG/DL (ref 70–99)
GLUCOSE BLD-MCNC: 269 MG/DL (ref 70–99)
HCT VFR BLD CALC: 35.4 % (ref 40.5–52.5)
HEMOGLOBIN: 12.1 G/DL (ref 13.5–17.5)
LYMPHOCYTES ABSOLUTE: 1.9 K/UL (ref 1–5.1)
LYMPHOCYTES RELATIVE PERCENT: 17.7 %
MCH RBC QN AUTO: 30.5 PG (ref 26–34)
MCHC RBC AUTO-ENTMCNC: 34.3 G/DL (ref 31–36)
MCV RBC AUTO: 88.8 FL (ref 80–100)
MONOCYTES ABSOLUTE: 0.8 K/UL (ref 0–1.3)
MONOCYTES RELATIVE PERCENT: 7 %
NEUTROPHILS ABSOLUTE: 8.1 K/UL (ref 1.7–7.7)
NEUTROPHILS RELATIVE PERCENT: 74 %
PDW BLD-RTO: 12.8 % (ref 12.4–15.4)
PERFORMED ON: ABNORMAL
PLATELET # BLD: 349 K/UL (ref 135–450)
PMV BLD AUTO: 7.2 FL (ref 5–10.5)
POTASSIUM SERPL-SCNC: 4.4 MMOL/L (ref 3.5–5.1)
RBC # BLD: 3.99 M/UL (ref 4.2–5.9)
SODIUM BLD-SCNC: 139 MMOL/L (ref 136–145)
WBC # BLD: 10.9 K/UL (ref 4–11)

## 2019-07-11 PROCEDURE — 6370000000 HC RX 637 (ALT 250 FOR IP): Performed by: NURSE PRACTITIONER

## 2019-07-11 PROCEDURE — 6360000002 HC RX W HCPCS: Performed by: PODIATRIST

## 2019-07-11 PROCEDURE — 36415 COLL VENOUS BLD VENIPUNCTURE: CPT

## 2019-07-11 PROCEDURE — 64447 NJX AA&/STRD FEMORAL NRV IMG: CPT | Performed by: ANESTHESIOLOGY

## 2019-07-11 PROCEDURE — 85025 COMPLETE CBC W/AUTO DIFF WBC: CPT

## 2019-07-11 PROCEDURE — 80048 BASIC METABOLIC PNL TOTAL CA: CPT

## 2019-07-11 PROCEDURE — 3600000012 HC SURGERY LEVEL 2 ADDTL 15MIN: Performed by: PODIATRIST

## 2019-07-11 PROCEDURE — 6370000000 HC RX 637 (ALT 250 FOR IP): Performed by: INTERNAL MEDICINE

## 2019-07-11 PROCEDURE — 1200000000 HC SEMI PRIVATE

## 2019-07-11 PROCEDURE — 36573 INSJ PICC RS&I 5 YR+: CPT

## 2019-07-11 PROCEDURE — 2500000003 HC RX 250 WO HCPCS: Performed by: NURSE ANESTHETIST, CERTIFIED REGISTERED

## 2019-07-11 PROCEDURE — 6360000002 HC RX W HCPCS: Performed by: INTERNAL MEDICINE

## 2019-07-11 PROCEDURE — 6370000000 HC RX 637 (ALT 250 FOR IP): Performed by: PODIATRIST

## 2019-07-11 PROCEDURE — 0QBN0ZZ EXCISION OF RIGHT METATARSAL, OPEN APPROACH: ICD-10-PCS | Performed by: PODIATRIST

## 2019-07-11 PROCEDURE — 6360000002 HC RX W HCPCS: Performed by: NURSE ANESTHETIST, CERTIFIED REGISTERED

## 2019-07-11 PROCEDURE — 3700000001 HC ADD 15 MINUTES (ANESTHESIA): Performed by: PODIATRIST

## 2019-07-11 PROCEDURE — 2709999900 HC NON-CHARGEABLE SUPPLY: Performed by: PODIATRIST

## 2019-07-11 PROCEDURE — 64445 NJX AA&/STRD SCIATIC NRV IMG: CPT | Performed by: ANESTHESIOLOGY

## 2019-07-11 PROCEDURE — 88304 TISSUE EXAM BY PATHOLOGIST: CPT

## 2019-07-11 PROCEDURE — 73630 X-RAY EXAM OF FOOT: CPT

## 2019-07-11 PROCEDURE — 3600000002 HC SURGERY LEVEL 2 BASE: Performed by: PODIATRIST

## 2019-07-11 PROCEDURE — 88311 DECALCIFY TISSUE: CPT

## 2019-07-11 PROCEDURE — C1751 CATH, INF, PER/CENT/MIDLINE: HCPCS

## 2019-07-11 PROCEDURE — 6360000002 HC RX W HCPCS: Performed by: ANESTHESIOLOGY

## 2019-07-11 PROCEDURE — 2500000003 HC RX 250 WO HCPCS: Performed by: INTERNAL MEDICINE

## 2019-07-11 PROCEDURE — 2500000003 HC RX 250 WO HCPCS: Performed by: ANESTHESIOLOGY

## 2019-07-11 PROCEDURE — 02HV33Z INSERTION OF INFUSION DEVICE INTO SUPERIOR VENA CAVA, PERCUTANEOUS APPROACH: ICD-10-PCS | Performed by: INTERNAL MEDICINE

## 2019-07-11 PROCEDURE — 2580000003 HC RX 258: Performed by: NURSE ANESTHETIST, CERTIFIED REGISTERED

## 2019-07-11 PROCEDURE — 2580000003 HC RX 258: Performed by: PODIATRIST

## 2019-07-11 PROCEDURE — 3700000000 HC ANESTHESIA ATTENDED CARE: Performed by: PODIATRIST

## 2019-07-11 PROCEDURE — 7100000000 HC PACU RECOVERY - FIRST 15 MIN: Performed by: PODIATRIST

## 2019-07-11 PROCEDURE — 7100000001 HC PACU RECOVERY - ADDTL 15 MIN: Performed by: PODIATRIST

## 2019-07-11 RX ORDER — SODIUM CHLORIDE 9 MG/ML
INJECTION, SOLUTION INTRAVENOUS CONTINUOUS PRN
Status: DISCONTINUED | OUTPATIENT
Start: 2019-07-11 | End: 2019-07-11 | Stop reason: SDUPTHER

## 2019-07-11 RX ORDER — ONDANSETRON 2 MG/ML
4 INJECTION INTRAMUSCULAR; INTRAVENOUS
Status: DISCONTINUED | OUTPATIENT
Start: 2019-07-11 | End: 2019-07-11 | Stop reason: HOSPADM

## 2019-07-11 RX ORDER — LIDOCAINE HYDROCHLORIDE 20 MG/ML
INJECTION, SOLUTION EPIDURAL; INFILTRATION; INTRACAUDAL; PERINEURAL PRN
Status: DISCONTINUED | OUTPATIENT
Start: 2019-07-11 | End: 2019-07-11 | Stop reason: SDUPTHER

## 2019-07-11 RX ORDER — OXYCODONE HYDROCHLORIDE AND ACETAMINOPHEN 5; 325 MG/1; MG/1
1 TABLET ORAL PRN
Status: DISCONTINUED | OUTPATIENT
Start: 2019-07-11 | End: 2019-07-11 | Stop reason: HOSPADM

## 2019-07-11 RX ORDER — MAGNESIUM HYDROXIDE 1200 MG/15ML
LIQUID ORAL CONTINUOUS PRN
Status: COMPLETED | OUTPATIENT
Start: 2019-07-11 | End: 2019-07-11

## 2019-07-11 RX ORDER — PROMETHAZINE HYDROCHLORIDE 25 MG/ML
6.25 INJECTION, SOLUTION INTRAMUSCULAR; INTRAVENOUS
Status: DISCONTINUED | OUTPATIENT
Start: 2019-07-11 | End: 2019-07-11 | Stop reason: HOSPADM

## 2019-07-11 RX ORDER — HYDRALAZINE HYDROCHLORIDE 20 MG/ML
5 INJECTION INTRAMUSCULAR; INTRAVENOUS EVERY 10 MIN PRN
Status: DISCONTINUED | OUTPATIENT
Start: 2019-07-11 | End: 2019-07-11 | Stop reason: HOSPADM

## 2019-07-11 RX ORDER — FENTANYL CITRATE 50 UG/ML
INJECTION, SOLUTION INTRAMUSCULAR; INTRAVENOUS PRN
Status: DISCONTINUED | OUTPATIENT
Start: 2019-07-11 | End: 2019-07-11 | Stop reason: SDUPTHER

## 2019-07-11 RX ORDER — MEPERIDINE HYDROCHLORIDE 50 MG/ML
12.5 INJECTION INTRAMUSCULAR; INTRAVENOUS; SUBCUTANEOUS EVERY 5 MIN PRN
Status: DISCONTINUED | OUTPATIENT
Start: 2019-07-11 | End: 2019-07-11 | Stop reason: HOSPADM

## 2019-07-11 RX ORDER — MIDAZOLAM HYDROCHLORIDE 1 MG/ML
INJECTION INTRAMUSCULAR; INTRAVENOUS PRN
Status: DISCONTINUED | OUTPATIENT
Start: 2019-07-11 | End: 2019-07-11 | Stop reason: SDUPTHER

## 2019-07-11 RX ORDER — BISACODYL 10 MG
10 SUPPOSITORY, RECTAL RECTAL DAILY PRN
Status: DISCONTINUED | OUTPATIENT
Start: 2019-07-11 | End: 2019-07-15 | Stop reason: HOSPADM

## 2019-07-11 RX ORDER — PROPOFOL 10 MG/ML
INJECTION, EMULSION INTRAVENOUS PRN
Status: DISCONTINUED | OUTPATIENT
Start: 2019-07-11 | End: 2019-07-11 | Stop reason: SDUPTHER

## 2019-07-11 RX ORDER — OXYCODONE HYDROCHLORIDE AND ACETAMINOPHEN 5; 325 MG/1; MG/1
2 TABLET ORAL PRN
Status: DISCONTINUED | OUTPATIENT
Start: 2019-07-11 | End: 2019-07-11 | Stop reason: HOSPADM

## 2019-07-11 RX ORDER — ONDANSETRON 2 MG/ML
INJECTION INTRAMUSCULAR; INTRAVENOUS PRN
Status: DISCONTINUED | OUTPATIENT
Start: 2019-07-11 | End: 2019-07-11 | Stop reason: SDUPTHER

## 2019-07-11 RX ORDER — BUPIVACAINE HYDROCHLORIDE 2.5 MG/ML
INJECTION, SOLUTION EPIDURAL; INFILTRATION; INTRACAUDAL PRN
Status: DISCONTINUED | OUTPATIENT
Start: 2019-07-11 | End: 2019-07-11 | Stop reason: SDUPTHER

## 2019-07-11 RX ORDER — FENTANYL CITRATE 50 UG/ML
25 INJECTION, SOLUTION INTRAMUSCULAR; INTRAVENOUS EVERY 5 MIN PRN
Status: DISCONTINUED | OUTPATIENT
Start: 2019-07-11 | End: 2019-07-11 | Stop reason: HOSPADM

## 2019-07-11 RX ADMIN — Medication 2 G: at 15:50

## 2019-07-11 RX ADMIN — ONDANSETRON 4 MG: 2 INJECTION INTRAMUSCULAR; INTRAVENOUS at 20:00

## 2019-07-11 RX ADMIN — HYDRALAZINE HYDROCHLORIDE 5 MG: 20 INJECTION INTRAMUSCULAR; INTRAVENOUS at 21:17

## 2019-07-11 RX ADMIN — BUPIVACAINE HYDROCHLORIDE 30 ML: 2.5 INJECTION, SOLUTION EPIDURAL; INFILTRATION; INTRACAUDAL; PERINEURAL at 17:51

## 2019-07-11 RX ADMIN — FENTANYL CITRATE 100 MCG: 50 INJECTION INTRAMUSCULAR; INTRAVENOUS at 17:50

## 2019-07-11 RX ADMIN — LIDOCAINE HYDROCHLORIDE 2 ML: 10 INJECTION, SOLUTION EPIDURAL; INFILTRATION; INTRACAUDAL; PERINEURAL at 15:21

## 2019-07-11 RX ADMIN — ONDANSETRON 4 MG: 2 INJECTION INTRAMUSCULAR; INTRAVENOUS at 19:29

## 2019-07-11 RX ADMIN — HYDRALAZINE HYDROCHLORIDE 5 MG: 20 INJECTION INTRAMUSCULAR; INTRAVENOUS at 21:05

## 2019-07-11 RX ADMIN — INSULIN LISPRO 1 UNITS: 100 INJECTION, SOLUTION INTRAVENOUS; SUBCUTANEOUS at 22:11

## 2019-07-11 RX ADMIN — FINASTERIDE 5 MG: 5 TABLET, FILM COATED ORAL at 08:52

## 2019-07-11 RX ADMIN — LIDOCAINE HYDROCHLORIDE 40 MG: 20 INJECTION, SOLUTION EPIDURAL; INFILTRATION; INTRACAUDAL; PERINEURAL at 19:25

## 2019-07-11 RX ADMIN — BUPIVACAINE HYDROCHLORIDE 20 ML: 2.5 INJECTION, SOLUTION EPIDURAL; INFILTRATION; INTRACAUDAL; PERINEURAL at 17:53

## 2019-07-11 RX ADMIN — Medication 10 MG: at 02:33

## 2019-07-11 RX ADMIN — Medication 2 G: at 08:47

## 2019-07-11 RX ADMIN — Medication 2 G: at 00:44

## 2019-07-11 RX ADMIN — HYDRALAZINE HYDROCHLORIDE 5 MG: 20 INJECTION INTRAMUSCULAR; INTRAVENOUS at 20:41

## 2019-07-11 RX ADMIN — FENTANYL CITRATE 25 MCG: 50 INJECTION INTRAMUSCULAR; INTRAVENOUS at 19:25

## 2019-07-11 RX ADMIN — HYDROCODONE BITARTRATE AND ACETAMINOPHEN 1 TABLET: 5; 325 TABLET ORAL at 23:47

## 2019-07-11 RX ADMIN — INSULIN LISPRO 4 UNITS: 100 INJECTION, SOLUTION INTRAVENOUS; SUBCUTANEOUS at 08:52

## 2019-07-11 RX ADMIN — MIDAZOLAM HYDROCHLORIDE 2 MG: 2 INJECTION, SOLUTION INTRAMUSCULAR; INTRAVENOUS at 17:50

## 2019-07-11 RX ADMIN — SODIUM CHLORIDE: 9 INJECTION, SOLUTION INTRAVENOUS at 19:04

## 2019-07-11 RX ADMIN — TAMSULOSIN HYDROCHLORIDE 0.4 MG: 0.4 CAPSULE ORAL at 08:47

## 2019-07-11 RX ADMIN — PROPOFOL 200 MG: 10 INJECTION, EMULSION INTRAVENOUS at 19:25

## 2019-07-11 RX ADMIN — FENTANYL CITRATE 25 MCG: 50 INJECTION INTRAMUSCULAR; INTRAVENOUS at 19:32

## 2019-07-11 RX ADMIN — Medication 2 G: at 23:47

## 2019-07-11 ASSESSMENT — PULMONARY FUNCTION TESTS
PIF_VALUE: 2
PIF_VALUE: 2
PIF_VALUE: 1
PIF_VALUE: 3
PIF_VALUE: 1
PIF_VALUE: 2
PIF_VALUE: 1
PIF_VALUE: 2
PIF_VALUE: 1
PIF_VALUE: 1
PIF_VALUE: 2
PIF_VALUE: 2
PIF_VALUE: 1
PIF_VALUE: 2
PIF_VALUE: 2
PIF_VALUE: 3
PIF_VALUE: 2
PIF_VALUE: 0
PIF_VALUE: 2
PIF_VALUE: 1
PIF_VALUE: 1
PIF_VALUE: 4
PIF_VALUE: 2
PIF_VALUE: 3
PIF_VALUE: 0
PIF_VALUE: 3
PIF_VALUE: 2
PIF_VALUE: 3
PIF_VALUE: 2
PIF_VALUE: 5
PIF_VALUE: 2
PIF_VALUE: 2
PIF_VALUE: 21

## 2019-07-11 ASSESSMENT — PAIN SCALES - GENERAL
PAINLEVEL_OUTOF10: 0
PAINLEVEL_OUTOF10: 7
PAINLEVEL_OUTOF10: 0

## 2019-07-11 ASSESSMENT — LIFESTYLE VARIABLES: SMOKING_STATUS: 0

## 2019-07-11 NOTE — PROGRESS NOTES
clubbing, cyanosis   Skin: Skin color, texture, turgor normal on exposed skin. Rt foot surgical dressing in place ( not removed)   Neurologic:  Alert, speech clear with no overt facial droop  Psychiatric: Alert and oriented, thought content appropriate, normal insight  Capillary Refill: Brisk,< 3 seconds   Peripheral Pulses: +2 palpable, equal bilaterally       Labs:   Recent Labs     07/09/19  0544 07/10/19  0621 07/11/19  0549   WBC 11.8* 10.2 10.9   HGB 10.9* 11.9* 12.1*   HCT 32.2* 35.0* 35.4*    317 349     Recent Labs     07/09/19  0544 07/10/19  0621 07/11/19  0549   * 134* 139   K 4.6 4.6 4.4    98* 103   CO2 23 28 28   BUN 16 14 15   CREATININE 0.8* 0.9 0.9   CALCIUM 8.5 8.9 8.9     No results for input(s): AST, ALT, BILIDIR, BILITOT, ALKPHOS in the last 72 hours. No results for input(s): INR in the last 72 hours. No results for input(s): Marcin Levee in the last 72 hours. Urinalysis:      Lab Results   Component Value Date    NITRU Negative 07/08/2019    WBCUA 0-2 07/08/2019    RBCUA 10-20 07/08/2019    BLOODU SMALL 07/08/2019    SPECGRAV <=1.005 07/08/2019    GLUCOSEU Negative 07/08/2019       Radiology:  VL LOWER EXTREMITY ARTERIAL SEGMENTAL PRESSURES W PPG BILATERAL   Final Result      XR FOOT RIGHT (2 VIEWS)   Final Result   Expected postsurgical changes from amputation of the 5th ray proximally at   the 5th metatarsal.         XR FOOT RIGHT (MIN 3 VIEWS)   Final Result   1. Age-indeterminate, possibly acute 5th metatarsal neck fracture, mildly   displaced. 2. Wound plantar aspect of the lateral portion right forefoot superficial to   the 5th metatarsal head with bone erosion at this level concerning for   osteomyelitis. MRI correlation recommended. 3. Diffuse edema lateral aspect of the right forefoot possibly related to   cellulitis. 4. Osteoarthritis. 5.  Vascular calcifications are noted reflecting calcific atherosclerosis.          VL DUP LOWER EXTREMITY ARTERIES BILATERAL    (Results Pending)   IR PICC WO SQ PORT/PUMP > 5 YEARS    (Results Pending)           Assessment/Plan:    Active Hospital Problems    Diagnosis    Receiving intravenous antibiotic treatment as outpatient [Z79.2]    Diabetes education, encounter for [Z71.89]    Moderate malnutrition ([address] [E44.0]    High fever [R50.9]    Bandemia [D72.825]    Closed displaced fracture of fifth metatarsal bone of right foot [S92.351A]    Elevated sed rate [R70.0]    Elevated C-reactive protein (CRP) [R79.82]    Overweight [E66.3]    Failure of outpatient treatment [Z78.9]    Diabetic polyneuropathy associated with type 2 diabetes mellitus ([address] [E11.42]    Right foot infection [L08.9]    Sepsis ([address] [A41.9]    Type 2 diabetes mellitus with right diabetic foot infection (HCC) [X04.055, L08.9]    Hypertension associated with diabetes ([facility] [address] [E11.59, I10]    Type 2 diabetes mellitus ([address] [E11.9]     Sepsis due to right diabetic foot infection: Podiatry and ID assisting. S/p I&D of rt foot with 5th toe and partial metatarsal amputation on 7/8. Culture grew MSSA. Plans for repeat  I& D on 7/11. Follow cultures. Continue abx per ID recs- cefazolin X 6 weeks till 8/19/2019. PICC line ordered       Constipation : likely due to pain meds. Had BM after laxative. Resolved       DM2: Last A1c in May 2019 was 6.7. Continue SSI     DVT Prophylaxis: lovenox     Moderate protein calorie malnutrition.: dietician assisting with dietary supplements       Diet: Diet NPO Time Specified  Code Status: Full Code    PT/OT Eval Status: to be ordered when ok with podiatry       Dispo - pending repeat I&D today. Possible d/c  tomorrow if ok with podiatry. PICC to be placed today.   SW to arrange home IV abx per ID Shonna Daly MD

## 2019-07-11 NOTE — PROGRESS NOTES
weight 200 lb (90.7 kg), SpO2 96 %. Integument:right foot: 3cm x 3cmx 1.5cm full thickness wound to plantar 5th MTP. Dorsal incision to right 5th ray 75% approximated with sutures. Packing in dorsal wound. Erythema greatly improved. Very minimal erythema and resolved edema. Scant purulence from plantar wound seen. Neurologic:  Protective sensation is grossly diminished  to light touch at the level of the toes, bilateral.  Vascular:  DP and PT pulses are barely palpable, bilateral.  CFT is brisk to all toes. No significant edema appreciated. Musculoskeletal: s/p partial 5th ray resection, right    WBC:13.0->11.8  Wound Culture: pending  Surgical culture abscess:2+ Gram positive cocci staph aureus  Surgical Culture wound: 1+ Gram positive cocci  Staph aureus   Surgical pathology  -5th toe: acute OM  -5th metatarsal acute OM  clearance margin: acute OM    Xray: s/p partial 5th metatarsal and 5th toe amputation    Assessment & Plan  Thank you for the opportunity to take part in this patient's care. Please call me with any questions. 1) Sepsis  -Continue IV abx per ID  -fevers resolved    2) Diabetic foot wound with abscess, ? OM  -Infection improved with resolving erythema and edema. Previous dorsal and plantar wounds left open for needed drainage. Scant purulence seen plantar. -OM seen at clearance margin.  -Recommended to patient that we return to the OR for 2nd flush out and more bone resection. He is in agreement.    -NPO after breakfast, time TBD  -Will continue to monitor and evaluate tomorrow  -NWB RLE    3) PVD  -ordered Non-invasive arterial studies, non healing wounds  -pending    4) DM with neuropathy  -Controlled A1c: 5.3     Kosta Quick DPM  Office: [phone]  Cell: [facility]  7/10/2019

## 2019-07-11 NOTE — ANESTHESIA PROCEDURE NOTES
Peripheral Block    Patient location during procedure: holding area  Start time: 7/11/2019 5:50 PM  End time: 7/11/2019 5:55 PM  Staffing  Anesthesiologist: Nick Ortega MD  Performed: anesthesiologist   Preanesthetic Checklist  Completed: patient identified, site marked, surgical consent, pre-op evaluation, timeout performed, IV checked, risks and benefits discussed, monitors and equipment checked, anesthesia consent given, oxygen available and patient being monitored  Peripheral Block  Patient position: supine  Prep: ChloraPrep  Patient monitoring: continuous pulse ox and IV access  Block type: Sciatic  Laterality: right  Injection technique: single-shot  Procedures: ultrasound guided  Local infiltration: lidocaine  Infiltration strength: 1 %  Dose: 2 mL  Popliteal  Provider prep: sterile gloves  Local infiltration: lidocaine  Needle  Needle gauge: 21 G  Needle length: 10 cm  Test dose: negative  Assessment  Injection assessment: negative aspiration for heme, no paresthesia on injection and local visualized surrounding nerve on ultrasound  Slow fractionated injection: yes  Hemodynamics: stable  Additional Notes  Pt. agrees to risks, benefits and alternatives to block  Block performed at the request of the surgeon for post-operative pain management   Immediately prior to procedure a \"time out\" was called to verify the correct patient, procedure, equipment, support staff. Site/side marked as required  Side: right  Site/Approach: lateral thigh 5-10 cm superior to the PF crease  Position: supine with leg elevated on blankets  Sedation: Midazolam 2 mg  +  Fentanyl  100  mcg  IV  Local Anesthetic Dose:  Lido 1%    2 ml sc prior to each block  Aseptic technique: prepped with chlorhexidine  Ultrasound:   yes- see attached image   Local Anesthetic: 0.25%  Bupivacaine   Amount:  30 ml in 5 ml increments after negative aspiration each time. Easy injection w/o resistance and w/o pain/paresthesias.  Pt tolerated

## 2019-07-11 NOTE — PROGRESS NOTES
Urology Progress Note      Subjective:   Pt has no c/o  Plans for OR noted    Vitals:  BP (!) 154/90   Pulse 93   Temp 98.4 °F (36.9 °C) (Oral)   Resp 16   Ht 5' 9\" (1.753 m)   Wt 200 lb (90.7 kg)   SpO2 96%   BMI 29.53 kg/m²   Temp  Av °F (36.7 °C)  Min: 97.7 °F (36.5 °C)  Max: 98.4 °F (36.9 °C)    Intake/Output Summary (Last 24 hours) at 2019 1121  Last data filed at 2019 1115  Gross per 24 hour   Intake 720 ml   Output 6675 ml   Net -5955 ml       Exam:   Larson in place with clear urine    Labs:  WBC:    Lab Results   Component Value Date    WBC 10.9 2019     Hemoglobin/Hematocrit:    Lab Results   Component Value Date    HGB 12.1 2019    HCT 35.4 2019     BMP:    Lab Results   Component Value Date     2019    K 4.4 2019    K 4.3 2019     2019    CO2 28 2019    BUN 15 2019    LABALBU 3.4 2019    CREATININE 0.9 2019    CALCIUM 8.9 2019    GFRAA >60 2019    LABGLOM >60 2019     PT/INR:  No results found for: PROTIME, INR  PTT:  No results found for: APTT[APTT    Urinalysis:     Urine Culture:      Blood Culture:      Antibiotic Therapy:      Imaging:       Impression/Plan:   VT prior to discharge    Ashutosh Darling PA-C

## 2019-07-12 LAB
ANION GAP SERPL CALCULATED.3IONS-SCNC: 9 MMOL/L (ref 3–16)
BASOPHILS ABSOLUTE: 0.1 K/UL (ref 0–0.2)
BASOPHILS RELATIVE PERCENT: 0.8 %
BLOOD CULTURE, ROUTINE: NORMAL
BUN BLDV-MCNC: 11 MG/DL (ref 7–20)
CALCIUM SERPL-MCNC: 8.7 MG/DL (ref 8.3–10.6)
CHLORIDE BLD-SCNC: 103 MMOL/L (ref 99–110)
CO2: 26 MMOL/L (ref 21–32)
CREAT SERPL-MCNC: 0.7 MG/DL (ref 0.9–1.3)
CULTURE, BLOOD 2: NORMAL
EOSINOPHILS ABSOLUTE: 0.2 K/UL (ref 0–0.6)
EOSINOPHILS RELATIVE PERCENT: 1.5 %
GFR AFRICAN AMERICAN: >60
GFR NON-AFRICAN AMERICAN: >60
GLUCOSE BLD-MCNC: 152 MG/DL (ref 70–99)
GLUCOSE BLD-MCNC: 165 MG/DL (ref 70–99)
GLUCOSE BLD-MCNC: 207 MG/DL (ref 70–99)
GLUCOSE BLD-MCNC: 234 MG/DL (ref 70–99)
GLUCOSE BLD-MCNC: 253 MG/DL (ref 70–99)
HCT VFR BLD CALC: 32.8 % (ref 40.5–52.5)
HEMOGLOBIN: 11.2 G/DL (ref 13.5–17.5)
LYMPHOCYTES ABSOLUTE: 2.1 K/UL (ref 1–5.1)
LYMPHOCYTES RELATIVE PERCENT: 17.4 %
MCH RBC QN AUTO: 30.4 PG (ref 26–34)
MCHC RBC AUTO-ENTMCNC: 34.2 G/DL (ref 31–36)
MCV RBC AUTO: 89 FL (ref 80–100)
MONOCYTES ABSOLUTE: 0.9 K/UL (ref 0–1.3)
MONOCYTES RELATIVE PERCENT: 7.1 %
NEUTROPHILS ABSOLUTE: 8.8 K/UL (ref 1.7–7.7)
NEUTROPHILS RELATIVE PERCENT: 73.2 %
PDW BLD-RTO: 12.6 % (ref 12.4–15.4)
PERFORMED ON: ABNORMAL
PLATELET # BLD: 352 K/UL (ref 135–450)
PMV BLD AUTO: 6.5 FL (ref 5–10.5)
POTASSIUM SERPL-SCNC: 4.2 MMOL/L (ref 3.5–5.1)
RBC # BLD: 3.69 M/UL (ref 4.2–5.9)
SODIUM BLD-SCNC: 138 MMOL/L (ref 136–145)
WBC # BLD: 12 K/UL (ref 4–11)

## 2019-07-12 PROCEDURE — 6370000000 HC RX 637 (ALT 250 FOR IP): Performed by: PODIATRIST

## 2019-07-12 PROCEDURE — 6360000002 HC RX W HCPCS: Performed by: PODIATRIST

## 2019-07-12 PROCEDURE — 2580000003 HC RX 258: Performed by: PODIATRIST

## 2019-07-12 PROCEDURE — 85025 COMPLETE CBC W/AUTO DIFF WBC: CPT

## 2019-07-12 PROCEDURE — 80048 BASIC METABOLIC PNL TOTAL CA: CPT

## 2019-07-12 PROCEDURE — 1200000000 HC SEMI PRIVATE

## 2019-07-12 RX ADMIN — Medication 10 ML: at 09:17

## 2019-07-12 RX ADMIN — INSULIN LISPRO 1 UNITS: 100 INJECTION, SOLUTION INTRAVENOUS; SUBCUTANEOUS at 22:14

## 2019-07-12 RX ADMIN — Medication 2 G: at 09:18

## 2019-07-12 RX ADMIN — FINASTERIDE 5 MG: 5 TABLET, FILM COATED ORAL at 09:18

## 2019-07-12 RX ADMIN — ENOXAPARIN SODIUM 40 MG: 40 INJECTION SUBCUTANEOUS at 09:18

## 2019-07-12 RX ADMIN — MORPHINE SULFATE 4 MG: 2 INJECTION, SOLUTION INTRAMUSCULAR; INTRAVENOUS at 09:34

## 2019-07-12 RX ADMIN — INSULIN LISPRO 2 UNITS: 100 INJECTION, SOLUTION INTRAVENOUS; SUBCUTANEOUS at 17:26

## 2019-07-12 RX ADMIN — Medication 2 G: at 16:05

## 2019-07-12 RX ADMIN — SODIUM CHLORIDE 125 ML/HR: 9 INJECTION, SOLUTION INTRAVENOUS at 20:12

## 2019-07-12 RX ADMIN — MORPHINE SULFATE 4 MG: 2 INJECTION, SOLUTION INTRAMUSCULAR; INTRAVENOUS at 06:45

## 2019-07-12 RX ADMIN — INSULIN LISPRO 4 UNITS: 100 INJECTION, SOLUTION INTRAVENOUS; SUBCUTANEOUS at 09:19

## 2019-07-12 RX ADMIN — HYDROCODONE BITARTRATE AND ACETAMINOPHEN 1 TABLET: 5; 325 TABLET ORAL at 16:02

## 2019-07-12 RX ADMIN — TAMSULOSIN HYDROCHLORIDE 0.4 MG: 0.4 CAPSULE ORAL at 09:18

## 2019-07-12 RX ADMIN — SODIUM CHLORIDE: 9 INJECTION, SOLUTION INTRAVENOUS at 12:31

## 2019-07-12 RX ADMIN — INSULIN LISPRO 4 UNITS: 100 INJECTION, SOLUTION INTRAVENOUS; SUBCUTANEOUS at 12:32

## 2019-07-12 RX ADMIN — POLYETHYLENE GLYCOL (3350) 17 G: 17 POWDER, FOR SOLUTION ORAL at 09:17

## 2019-07-12 RX ADMIN — MORPHINE SULFATE 4 MG: 2 INJECTION, SOLUTION INTRAMUSCULAR; INTRAVENOUS at 12:36

## 2019-07-12 RX ADMIN — HYDROCODONE BITARTRATE AND ACETAMINOPHEN 1 TABLET: 5; 325 TABLET ORAL at 04:25

## 2019-07-12 RX ADMIN — HYDROCODONE BITARTRATE AND ACETAMINOPHEN 1 TABLET: 5; 325 TABLET ORAL at 22:13

## 2019-07-12 ASSESSMENT — PAIN SCALES - GENERAL
PAINLEVEL_OUTOF10: 7
PAINLEVEL_OUTOF10: 7
PAINLEVEL_OUTOF10: 6
PAINLEVEL_OUTOF10: 6
PAINLEVEL_OUTOF10: 8
PAINLEVEL_OUTOF10: 5
PAINLEVEL_OUTOF10: 6
PAINLEVEL_OUTOF10: 5
PAINLEVEL_OUTOF10: 7

## 2019-07-12 ASSESSMENT — PAIN DESCRIPTION - LOCATION
LOCATION: FOOT

## 2019-07-12 ASSESSMENT — PAIN DESCRIPTION - ORIENTATION
ORIENTATION: RIGHT

## 2019-07-12 ASSESSMENT — PAIN DESCRIPTION - PAIN TYPE
TYPE: SURGICAL PAIN

## 2019-07-12 NOTE — FLOWSHEET NOTE
Pt arrived to floor from PACU with ex-wife Natali Jackson at bedside. Report given by Sabrina Daniels in PACU. 4 eye skin assessment complete (see separate flowsheet note for details). Pt re-oriented to room, call light, phone, menu, bed alarm. Pt assessed, VSS, given new meal, tolerating well. Pt and ex-wife instructed to use call light for pain or other needs, verbalize understanding. Call light within reach, posey bed alarm engaged, will continue to monitor.

## 2019-07-12 NOTE — PROGRESS NOTES
Reports to scarlett browning, pt transported back to   4 Eyes Skin Assessment     The patient is being assess for   Post-Op Surgical    I agree that 2 RN's have performed a thorough Head to Toe Skin Assessment on the patient. ALL assessment sites listed below have been assessed. Areas assessed by both nurses:   [x]   Head, Face, and Ears   [x]   Shoulders, Back, and Chest, Abdomen  [x]   Arms, Elbows, and Hands   [x]   Coccyx, Sacrum, and Ischium  [x]   Legs, Feet, and Heels        No signs of new skin breakdown from OR. Blanchable redness bilateral buttocks/coccyx. Sacral heart placed    **SHARE this note so that the co-signing nurse is able to place an eSignature**    Co-signer eSignature: {[email or web address]:[id number]}    Does the Patient have Skin Breakdown?   No          Chetan Prevention initiated:  Yes  Wound Care Orders initiated:  No      WOC nurse consulted for Pressure Injury (Stage 3,4, Unstageable, DTI, NWPT, Complex wounds)and New or Established Ostomies:  No      Primary Nurse eSignature: Electronically signed by Norma Andujar RN on 7/11/19 at 9:35 PM

## 2019-07-12 NOTE — PROGRESS NOTES
Urology Progress Note      Subjective:   Pt has no c/o    Vitals:  BP (!) 167/89   Pulse 88   Temp 98 °F (36.7 °C) (Oral)   Resp 16   Ht 5' 9\" (1.753 m)   Wt 200 lb (90.7 kg)   SpO2 95%   BMI 29.53 kg/m²   Temp  Av °F (36.7 °C)  Min: 97.7 °F (36.5 °C)  Max: 98.5 °F (36.9 °C)    Intake/Output Summary (Last 24 hours) at 2019 1058  Last data filed at 2019 4296  Gross per 24 hour   Intake 970 ml   Output 5775 ml   Net -4805 ml       Exam:   Farooq in place with yellow urine    Labs:  WBC:    Lab Results   Component Value Date    WBC 12.0 2019     Hemoglobin/Hematocrit:    Lab Results   Component Value Date    HGB 11.2 2019    HCT 32.8 2019     BMP:    Lab Results   Component Value Date     2019    K 4.2 2019    K 4.3 2019     2019    CO2 26 2019    BUN 11 2019    LABALBU 3.4 2019    CREATININE 0.7 2019    CALCIUM 8.7 2019    GFRAA >60 2019    LABGLOM >60 2019     PT/INR:  No results found for: PROTIME, INR  PTT:  No results found for: APTT[APTT    Urinalysis:     Urine Culture:      Blood Culture:      Antibiotic Therapy:      Imaging:     Impression/Plan:   Continue farooq until ambulating-if DC is over the weekend he will go home with farooq and f/u with us in the office    Ashutosh Darling PA-C

## 2019-07-12 NOTE — PROGRESS NOTES
times per day Henrine Veronica, DPM   10 mL at 07/12/19 0163    sodium chloride flush 0.9 % injection 10 mL  10 mL Intravenous PRN Henrine Veronica, DPM        magnesium hydroxide (MILK OF MAGNESIA) 400 MG/5ML suspension 30 mL  30 mL Oral Daily PRN Henrine Veronica, DPM        ondansetron [facility]) injection 4 mg  4 mg Intravenous Q6H PRN Henrine Veronica, DPM        enoxaparin (LOVENOX) injection 40 mg  40 mg Subcutaneous Daily Henrine Veronica, DPM   40 mg at 07/12/19 3617    0.9 % sodium chloride infusion   Intravenous Continuous Henrine Veronica,  mL/hr at 07/12/19 1231      acetaminophen (TYLENOL) tablet 650 mg  650 mg Oral Q4H PRN Henrine Veronica, DPM   650 mg at 07/09/19 2110    morphine (PF) injection 2 mg  2 mg Intravenous Q2H PRN Henrine Veronica, DPM   2 mg at 07/08/19 2205    Or    morphine (PF) injection 4 mg  4 mg Intravenous Q2H PRN Henrine Veronica, DPM   4 mg at 07/12/19 1236     Allergies   Allergen Reactions    Metformin And Related Diarrhea     Active Problems:    Right foot infection    Hypertension associated with diabetes (HCC)    Type 2 diabetes mellitus (HCC)    Sepsis (HCC)    Type 2 diabetes mellitus with right diabetic foot infection (HCC)    High fever    Bandemia    Closed displaced fracture of fifth metatarsal bone of right foot    Elevated sed rate    Elevated C-reactive protein (CRP)    Overweight    Failure of outpatient treatment    Diabetic polyneuropathy associated with type 2 diabetes mellitus ([address]    Moderate malnutrition ([address]    Receiving intravenous antibiotic treatment as outpatient    Diabetes education, encounter for  Resolved Problems:    * No resolved hospital problems. *    Blood pressure (!) 167/89, pulse 88, temperature 98 °F (36.7 °C), temperature source Oral, resp. rate 16, height 5' 9\" (1.753 m), weight 200 lb (90.7 kg), SpO2 95 %.     Subjective  Objective:  Vital signs: (most recent): Blood pressure (!) 167/89, pulse 88, temperature 98 °F (36.7 °C), temperature

## 2019-07-13 ENCOUNTER — APPOINTMENT (OUTPATIENT)
Dept: MRI IMAGING | Age: 54
DRG: 854 | End: 2019-07-13
Payer: COMMERCIAL

## 2019-07-13 LAB
ANAEROBIC CULTURE: ABNORMAL
ANAEROBIC CULTURE: ABNORMAL
ANION GAP SERPL CALCULATED.3IONS-SCNC: 8 MMOL/L (ref 3–16)
ATYPICAL LYMPHOCYTE RELATIVE PERCENT: 1 % (ref 0–6)
BANDED NEUTROPHILS RELATIVE PERCENT: 8 % (ref 0–7)
BASOPHILS ABSOLUTE: 0.2 K/UL (ref 0–0.2)
BASOPHILS RELATIVE PERCENT: 2 %
BUN BLDV-MCNC: 8 MG/DL (ref 7–20)
CALCIUM SERPL-MCNC: 8.7 MG/DL (ref 8.3–10.6)
CHLORIDE BLD-SCNC: 100 MMOL/L (ref 99–110)
CO2: 27 MMOL/L (ref 21–32)
CREAT SERPL-MCNC: 0.7 MG/DL (ref 0.9–1.3)
CULTURE SURGICAL: ABNORMAL
CULTURE SURGICAL: ABNORMAL
EOSINOPHILS ABSOLUTE: 0.7 K/UL (ref 0–0.6)
EOSINOPHILS RELATIVE PERCENT: 6 %
GFR AFRICAN AMERICAN: >60
GFR NON-AFRICAN AMERICAN: >60
GLUCOSE BLD-MCNC: 153 MG/DL (ref 70–99)
GLUCOSE BLD-MCNC: 165 MG/DL (ref 70–99)
GLUCOSE BLD-MCNC: 204 MG/DL (ref 70–99)
GLUCOSE BLD-MCNC: 223 MG/DL (ref 70–99)
GLUCOSE BLD-MCNC: 233 MG/DL (ref 70–99)
GRAM STAIN RESULT: ABNORMAL
GRAM STAIN RESULT: ABNORMAL
HCT VFR BLD CALC: 34.6 % (ref 40.5–52.5)
HEMOGLOBIN: 11.6 G/DL (ref 13.5–17.5)
LYMPHOCYTES ABSOLUTE: 2.5 K/UL (ref 1–5.1)
LYMPHOCYTES RELATIVE PERCENT: 21 %
MCH RBC QN AUTO: 30.2 PG (ref 26–34)
MCHC RBC AUTO-ENTMCNC: 33.6 G/DL (ref 31–36)
MCV RBC AUTO: 89.9 FL (ref 80–100)
METAMYELOCYTES RELATIVE PERCENT: 1 %
MONOCYTES ABSOLUTE: 1.1 K/UL (ref 0–1.3)
MONOCYTES RELATIVE PERCENT: 10 %
NEUTROPHILS ABSOLUTE: 6.8 K/UL (ref 1.7–7.7)
NEUTROPHILS RELATIVE PERCENT: 51 %
ORGANISM: ABNORMAL
PDW BLD-RTO: 12.9 % (ref 12.4–15.4)
PERFORMED ON: ABNORMAL
PLATELET # BLD: 370 K/UL (ref 135–450)
PMV BLD AUTO: 6.5 FL (ref 5–10.5)
POTASSIUM SERPL-SCNC: 3.9 MMOL/L (ref 3.5–5.1)
RBC # BLD: 3.84 M/UL (ref 4.2–5.9)
RBC # BLD: NORMAL 10*6/UL
SODIUM BLD-SCNC: 135 MMOL/L (ref 136–145)
WBC # BLD: 11.4 K/UL (ref 4–11)

## 2019-07-13 PROCEDURE — 6360000002 HC RX W HCPCS: Performed by: PODIATRIST

## 2019-07-13 PROCEDURE — 6370000000 HC RX 637 (ALT 250 FOR IP): Performed by: PODIATRIST

## 2019-07-13 PROCEDURE — 2580000003 HC RX 258: Performed by: PODIATRIST

## 2019-07-13 PROCEDURE — 6370000000 HC RX 637 (ALT 250 FOR IP): Performed by: INTERNAL MEDICINE

## 2019-07-13 PROCEDURE — 6360000002 HC RX W HCPCS: Performed by: INTERNAL MEDICINE

## 2019-07-13 PROCEDURE — 1200000000 HC SEMI PRIVATE

## 2019-07-13 PROCEDURE — 85025 COMPLETE CBC W/AUTO DIFF WBC: CPT

## 2019-07-13 PROCEDURE — 80048 BASIC METABOLIC PNL TOTAL CA: CPT

## 2019-07-13 PROCEDURE — A9579 GAD-BASE MR CONTRAST NOS,1ML: HCPCS | Performed by: PODIATRIST

## 2019-07-13 PROCEDURE — 73720 MRI LWR EXTREMITY W/O&W/DYE: CPT

## 2019-07-13 PROCEDURE — 36592 COLLECT BLOOD FROM PICC: CPT

## 2019-07-13 PROCEDURE — 99233 SBSQ HOSP IP/OBS HIGH 50: CPT | Performed by: INTERNAL MEDICINE

## 2019-07-13 PROCEDURE — 36591 DRAW BLOOD OFF VENOUS DEVICE: CPT

## 2019-07-13 PROCEDURE — 2580000003 HC RX 258: Performed by: INTERNAL MEDICINE

## 2019-07-13 PROCEDURE — 6360000004 HC RX CONTRAST MEDICATION: Performed by: PODIATRIST

## 2019-07-13 RX ORDER — LACTOBACILLUS RHAMNOSUS GG 10B CELL
1 CAPSULE ORAL 2 TIMES DAILY WITH MEALS
Status: DISCONTINUED | OUTPATIENT
Start: 2019-07-13 | End: 2019-07-15 | Stop reason: HOSPADM

## 2019-07-13 RX ADMIN — ENOXAPARIN SODIUM 40 MG: 40 INJECTION SUBCUTANEOUS at 10:39

## 2019-07-13 RX ADMIN — Medication 2 G: at 10:54

## 2019-07-13 RX ADMIN — Medication 2 G: at 17:13

## 2019-07-13 RX ADMIN — Medication 10 ML: at 17:13

## 2019-07-13 RX ADMIN — SODIUM CHLORIDE 125 ML/HR: 9 INJECTION, SOLUTION INTRAVENOUS at 05:19

## 2019-07-13 RX ADMIN — AMPICILLIN SODIUM AND SULBACTAM SODIUM 3 G: 2; 1 INJECTION, POWDER, FOR SOLUTION INTRAMUSCULAR; INTRAVENOUS at 21:12

## 2019-07-13 RX ADMIN — FINASTERIDE 5 MG: 5 TABLET, FILM COATED ORAL at 10:40

## 2019-07-13 RX ADMIN — INSULIN LISPRO 4 UNITS: 100 INJECTION, SOLUTION INTRAVENOUS; SUBCUTANEOUS at 10:48

## 2019-07-13 RX ADMIN — Medication 10 ML: at 08:45

## 2019-07-13 RX ADMIN — INSULIN LISPRO 4 UNITS: 100 INJECTION, SOLUTION INTRAVENOUS; SUBCUTANEOUS at 17:13

## 2019-07-13 RX ADMIN — INSULIN LISPRO 1 UNITS: 100 INJECTION, SOLUTION INTRAVENOUS; SUBCUTANEOUS at 21:16

## 2019-07-13 RX ADMIN — Medication 1 CAPSULE: at 21:11

## 2019-07-13 RX ADMIN — GADOTERIDOL 18 ML: 279.3 INJECTION, SOLUTION INTRAVENOUS at 12:36

## 2019-07-13 RX ADMIN — TAMSULOSIN HYDROCHLORIDE 0.4 MG: 0.4 CAPSULE ORAL at 10:46

## 2019-07-13 RX ADMIN — Medication 2 G: at 00:00

## 2019-07-13 RX ADMIN — POLYETHYLENE GLYCOL (3350) 17 G: 17 POWDER, FOR SOLUTION ORAL at 10:40

## 2019-07-13 RX ADMIN — MORPHINE SULFATE 4 MG: 2 INJECTION, SOLUTION INTRAMUSCULAR; INTRAVENOUS at 10:45

## 2019-07-13 ASSESSMENT — ENCOUNTER SYMPTOMS
BACK PAIN: 0
NAUSEA: 0
SORE THROAT: 0
WHEEZING: 0
RHINORRHEA: 0
CONSTIPATION: 0
EYE REDNESS: 0
EYE DISCHARGE: 0
DIARRHEA: 0
SHORTNESS OF BREATH: 0
ABDOMINAL PAIN: 0
COUGH: 0
TROUBLE SWALLOWING: 0

## 2019-07-13 ASSESSMENT — PAIN DESCRIPTION - ORIENTATION: ORIENTATION: RIGHT

## 2019-07-13 ASSESSMENT — PAIN SCALES - GENERAL
PAINLEVEL_OUTOF10: 3
PAINLEVEL_OUTOF10: 8
PAINLEVEL_OUTOF10: 3

## 2019-07-13 ASSESSMENT — PAIN DESCRIPTION - PAIN TYPE: TYPE: SURGICAL PAIN

## 2019-07-13 ASSESSMENT — PAIN DESCRIPTION - LOCATION: LOCATION: FOOT

## 2019-07-13 NOTE — OP NOTE
[address] [address]                                OPERATIVE REPORT    PATIENT NAME: Diego Rivera                       :        1965  MED REC NO:   [id number]                          ROOM:       [id number]  ACCOUNT NO:   [de-identified]                           ADMIT DATE: 2019  PROVIDER:     Kosta Quick    DATE OF PROCEDURE:  2019    PREOPERATIVE DIAGNOSES:  Right diabetic foot infection, osteomyelitis. POSTOPERATIVE DIAGNOSES:  Right diabetic foot infection, osteomyelitis. OPERATION PERFORMED:  Incision and drainage with fifth metatarsal bone  resection. SURGEON:  Kosta Quick. ANESTHESIA:  General.    ESTIMATED BLOOD LOSS:  20 mL. COMPLICATIONS:  None. HEMOSTASIS:  46 minutes in pneumatic ankle tourniquet. INJECTABLES:  None. IMPLANTS:  None. SPECIMENS:  Soft tissue. INDICATIONS FOR THE PROCEDURE:  The patient is a male patient, who was  admitted to the hospital for abscess, osteomyelitis, cellulitis of his  right foot. He underwent incision and drainage with fifth toe and  partial fifth metatarsal resection. There was further purulent drainage  noted as well as positive bone infection at the clearance margins noted. Therefore, we opted to proceed with a second flush out and further bone  resection of the fifth metatarsal.  All risks, benefits and alternatives  of surgery were discussed in detail and a consent form was signed  willingly and placed in the patient's chart. Please note, no guarantees  were made. OPERATIVE PROCEDURE:  Under mild sedation, the patient was brought into  the operating room, placed supine on the operating room table. A  time-out was performed indicating the correct procedure, patient and  laterality of side. A well-padded pneumatic ankle tourniquet was placed  about the patient's right ankle.   Further induction was then achieved by  the
discussed  findings on x-ray. There appeared to be areas of lucency as well as  fracture to the fifth metatarsal head consistent with osteomyelitis. An  MRI was ordered, however, was not obtained due to the patient's  movement. I discussed with the patient that due to the severe infection  of his foot, I recommended an incision and drainage. I discussed with  him that the wound appears to be coming from the fifth  metatarsophalangeal joint. I was able to probe-to-bone from the wound. We discussed treatment options including a partial fifth ray amputation  along with a fifth toe amputation, if intraoperatively, it did not  appear healthy. The patient agreed. All risks, benefits and  alternatives of surgery were discussed in detail and a consent form was  signed willingly and placed in the patient's chart. Please note, no  guarantees were made. OPERATIVE PROCEDURE:  Under mild sedation, the patient was brought into  the operating room and placed supine on the operating room table. A  time-out was performed indicating the correct procedure, patient and  laterality of side. A well-padded calf tourniquet was placed on the  patient's right calf. Further induction was then achieved by the  Anesthesia Team.  The right lower extremity was then scrubbed, prepped  and draped in the usual aseptic manner. Attention then was directed to  the right foot. There was a dorsal wound with purulence extending from  the wound over the dorsal aspect of the first metatarsal head. The  fifth metatarsal shaft appeared dusky and there was a plantar wound to  the fifth metatarsophalangeal joint, 3 cm x 3 cm in size. Both wounds  probed directly to the fifth metatarsal head as well as the joint with  purulence extending out. I made a dorsal incision through the dorsal  wound down to the fifth metatarsal bone. Immediately, there was abscess  extending from the joint laterally along the fifth metatarsal head.    This was

## 2019-07-13 NOTE — PROGRESS NOTES
First goal should be to avoid gaining more weight and staying at current weight (or within 5 percent). People at high risk of developing diabetes who are able to lose 5 percent of their body weight and maintain this weight will reduce their risk of developing diabetes by about 50 percent and reduce their blood pressure. Losing more than 15 percent of  body weight and staying at this weight is an extremely good result, even if you never reach your \"dream\" or \"ideal\" weight. Lifestyle changes including changing eating habits, substituting excess carbohydrates with proteins, stress reduction, using self-help programs like Weight Watchers®, Overeaters Anonymous®, and Take Off Pounds Sensibly (TOPS)© , following DASH diet and increasing exercise or walking briskly daily for half hour to and hour 5-7 days a week was suggested among other measures. Information was given about various weight loss education programs and their websites like www.cdc.gov/healthyweight, www.[facility].gov and www.health.gov/dietaryguidelines/    TIME SPENT TODAY:     - Spent over  36  minutes on visit (including interval history, physical exam, review of data including labs, cultures, imaging, development and implementation of treatment plan and coordination of complex care). - Over 50% of time spent with patient face to face on counseling and education. Thank you for involving me in the care of your patient. I will continue to follow. If you have anyadditional questions, please do not hesitate to contact me. Subjective: Interval history: Patient was seen and examined at bedside. Interval history was obtained. She is afebrile. She is tolerating antibiotics okay. Surgical dressing in place on the right foot     REVIEW OF SYSTEMS:  *    Review of Systems   Constitutional: Negative for chills, diaphoresis and fever. HENT: Negative for ear discharge, ear pain, rhinorrhea, sore throat and trouble swallowing.     Eyes: Negative possibly related to   cellulitis. 4. Osteoarthritis. 5.  Vascular calcifications are noted reflecting calcific atherosclerosis. Medications: All current and past medications were reviewed.  insulin lispro  0-12 Units Subcutaneous TID WC    insulin lispro  0-6 Units Subcutaneous Nightly    ceFAZolin  2 g Intravenous Q8H    sodium chloride flush  10 mL Intravenous 2 times per day    polyethylene glycol  17 g Oral Daily    tamsulosin  0.4 mg Oral Daily    finasteride  5 mg Oral Daily    sodium chloride flush  10 mL Intravenous 2 times per day    enoxaparin  40 mg Subcutaneous Daily        dextrose      sodium chloride 125 mL/hr (07/13/19 0519)       bisacodyl, sodium chloride flush, HYDROcodone 5 mg - acetaminophen, cyclobenzaprine, glucose, dextrose, glucagon (rDNA), dextrose, sodium chloride flush, magnesium hydroxide, ondansetron, acetaminophen, morphine **OR** morphine      Problem list:       Patient Active Problem List   Diagnosis Code    Osteomyelitis ([facility] M86.9    Right foot infection L08.9    Hypertension associated with diabetes ([facility] E11.59, I10    Type 2 diabetes mellitus ([facility] E11.9    Sepsis ([facility] A41.9    Type 2 diabetes mellitus with right diabetic foot infection ([facility] E11.628, L08.9    High fever R50.9    Bandemia D72.825    Closed displaced fracture of fifth metatarsal bone of right foot S92.351A    Elevated sed rate R70.0    Elevated C-reactive protein (CRP) R79.82    Overweight E66.3    Failure of outpatient treatment Z78.9    Diabetic polyneuropathy associated with type 2 diabetes mellitus ([facility] E11.42    Moderate malnutrition ([address] E44.0    Receiving intravenous antibiotic treatment as outpatient Z79.2    Diabetes education, encounter for Z71.89       Please note that this chart was generated using Dragon dictation software.  Although every effort was made to ensure the accuracy of this automated transcription, some errors in transcription may have

## 2019-07-13 NOTE — ED PROVIDER NOTES
Potassium 4.1 3.5 - 5.1 mmol/L    Chloride 93 (L) 99 - 110 mmol/L    CO2 24 21 - 32 mmol/L    Anion Gap 16 3 - 16    Glucose 133 (H) 70 - 99 mg/dL    BUN 17 7 - 20 mg/dL    CREATININE 1.0 0.9 - 1.3 mg/dL    GFR Non-African American >60 >60    GFR African American >60 >60    Calcium 9.2 8.3 - 10.6 mg/dL    Total Protein 7.6 6.4 - 8.2 g/dL    Alb 3.4 3.4 - 5.0 g/dL    Albumin/Globulin Ratio 0.8 (L) 1.1 - 2.2    Total Bilirubin 0.6 0.0 - 1.0 mg/dL    Alkaline Phosphatase 126 40 - 129 U/L    ALT 21 10 - 40 U/L    AST 28 15 - 37 U/L    Globulin 4.2 g/dL   Blood gas, venous   Result Value Ref Range    pH, Gino 7.528 (H) 7.350 - 7.450    pCO2, Gino 29.9 (L) 40.0 - 50.0 mmHg    pO2, Gino 64.9 (H) 25.0 - 40.0 mmHg    HCO3, Venous 24.3 23.0 - 29.0 mmol/L    Base Excess, Gino 2.5 -3.0 - 3.0 mmol/L    O2 Sat, Gino 94 Not Established %    Carboxyhemoglobin 1.5 0.0 - 1.5 %    MetHgb, Gino 0.6 <1.5 %    TC02 (Calc), Gino 25 Not Established mmol/L    O2 Content, Gino 19 Not Established VOL %    O2 Therapy Unknown    Lactate, Sepsis   Result Value Ref Range    Lactic Acid, Sepsis 1.4 0.4 - 1.9 mmol/L   Urinalysis, reflex to microscopic   Result Value Ref Range    Color, UA Yellow Straw/Yellow    Clarity, UA Clear Clear    Glucose, Ur Negative Negative mg/dL    Bilirubin Urine Negative Negative    Ketones, Urine Negative Negative mg/dL    Specific Gravity, UA <=1.005 1.005 - 1.030    Blood, Urine SMALL (A) Negative    pH, UA 6.5 5.0 - 8.0    Protein, UA Negative Negative mg/dL    Urobilinogen, Urine 0.2 <2.0 E.U./dL    Nitrite, Urine Negative Negative    Leukocyte Esterase, Urine Negative Negative    Microscopic Examination YES     Urine Type Not Specified    C-Reactive Protein   Result Value Ref Range    .4 (H) 0.0 - 5.1 mg/L   Sedimentation Rate   Result Value Ref Range    Sed Rate 101 (H) 0 - 20 mm/Hr     Patient with fever, redness, swelling and increasing ulcer formation on right foot in diabetic.   Clear cellulitic change to

## 2019-07-14 LAB
ANION GAP SERPL CALCULATED.3IONS-SCNC: 8 MMOL/L (ref 3–16)
BANDED NEUTROPHILS RELATIVE PERCENT: 10 % (ref 0–7)
BASOPHILS ABSOLUTE: 0 K/UL (ref 0–0.2)
BASOPHILS RELATIVE PERCENT: 0 %
BUN BLDV-MCNC: 8 MG/DL (ref 7–20)
CALCIUM SERPL-MCNC: 9 MG/DL (ref 8.3–10.6)
CHLORIDE BLD-SCNC: 104 MMOL/L (ref 99–110)
CO2: 27 MMOL/L (ref 21–32)
CREAT SERPL-MCNC: 0.6 MG/DL (ref 0.9–1.3)
EOSINOPHILS ABSOLUTE: 0.4 K/UL (ref 0–0.6)
EOSINOPHILS RELATIVE PERCENT: 3 %
GFR AFRICAN AMERICAN: >60
GFR NON-AFRICAN AMERICAN: >60
GLUCOSE BLD-MCNC: 168 MG/DL (ref 70–99)
GLUCOSE BLD-MCNC: 171 MG/DL (ref 70–99)
GLUCOSE BLD-MCNC: 177 MG/DL (ref 70–99)
GLUCOSE BLD-MCNC: 201 MG/DL (ref 70–99)
GLUCOSE BLD-MCNC: 241 MG/DL (ref 70–99)
HCT VFR BLD CALC: 34.5 % (ref 40.5–52.5)
HEMOGLOBIN: 11.5 G/DL (ref 13.5–17.5)
LYMPHOCYTES ABSOLUTE: 1.9 K/UL (ref 1–5.1)
LYMPHOCYTES RELATIVE PERCENT: 15 %
MCH RBC QN AUTO: 29.8 PG (ref 26–34)
MCHC RBC AUTO-ENTMCNC: 33.2 G/DL (ref 31–36)
MCV RBC AUTO: 89.7 FL (ref 80–100)
METAMYELOCYTES RELATIVE PERCENT: 2 %
MONOCYTES ABSOLUTE: 0.6 K/UL (ref 0–1.3)
MONOCYTES RELATIVE PERCENT: 5 %
NEUTROPHILS ABSOLUTE: 9.5 K/UL (ref 1.7–7.7)
NEUTROPHILS RELATIVE PERCENT: 65 %
PDW BLD-RTO: 12.8 % (ref 12.4–15.4)
PERFORMED ON: ABNORMAL
PLATELET # BLD: 382 K/UL (ref 135–450)
PMV BLD AUTO: 6.3 FL (ref 5–10.5)
POTASSIUM SERPL-SCNC: 4.2 MMOL/L (ref 3.5–5.1)
RBC # BLD: 3.85 M/UL (ref 4.2–5.9)
RBC # BLD: NORMAL 10*6/UL
SODIUM BLD-SCNC: 139 MMOL/L (ref 136–145)
WBC # BLD: 12.4 K/UL (ref 4–11)

## 2019-07-14 PROCEDURE — 6370000000 HC RX 637 (ALT 250 FOR IP): Performed by: INTERNAL MEDICINE

## 2019-07-14 PROCEDURE — 85025 COMPLETE CBC W/AUTO DIFF WBC: CPT

## 2019-07-14 PROCEDURE — 6370000000 HC RX 637 (ALT 250 FOR IP): Performed by: PODIATRIST

## 2019-07-14 PROCEDURE — 6360000002 HC RX W HCPCS: Performed by: PODIATRIST

## 2019-07-14 PROCEDURE — 6360000002 HC RX W HCPCS: Performed by: INTERNAL MEDICINE

## 2019-07-14 PROCEDURE — 2580000003 HC RX 258: Performed by: PODIATRIST

## 2019-07-14 PROCEDURE — 80048 BASIC METABOLIC PNL TOTAL CA: CPT

## 2019-07-14 PROCEDURE — 1200000000 HC SEMI PRIVATE

## 2019-07-14 PROCEDURE — 2580000003 HC RX 258: Performed by: INTERNAL MEDICINE

## 2019-07-14 RX ADMIN — AMPICILLIN SODIUM AND SULBACTAM SODIUM 3 G: 2; 1 INJECTION, POWDER, FOR SOLUTION INTRAMUSCULAR; INTRAVENOUS at 21:12

## 2019-07-14 RX ADMIN — FINASTERIDE 5 MG: 5 TABLET, FILM COATED ORAL at 09:54

## 2019-07-14 RX ADMIN — Medication 10 ML: at 09:55

## 2019-07-14 RX ADMIN — Medication 1 CAPSULE: at 17:25

## 2019-07-14 RX ADMIN — INSULIN LISPRO 4 UNITS: 100 INJECTION, SOLUTION INTRAVENOUS; SUBCUTANEOUS at 17:23

## 2019-07-14 RX ADMIN — AMPICILLIN SODIUM AND SULBACTAM SODIUM 3 G: 2; 1 INJECTION, POWDER, FOR SOLUTION INTRAMUSCULAR; INTRAVENOUS at 15:17

## 2019-07-14 RX ADMIN — Medication 1 CAPSULE: at 09:54

## 2019-07-14 RX ADMIN — AMPICILLIN SODIUM AND SULBACTAM SODIUM 3 G: 2; 1 INJECTION, POWDER, FOR SOLUTION INTRAMUSCULAR; INTRAVENOUS at 02:26

## 2019-07-14 RX ADMIN — TAMSULOSIN HYDROCHLORIDE 0.4 MG: 0.4 CAPSULE ORAL at 09:54

## 2019-07-14 RX ADMIN — HYDROCODONE BITARTRATE AND ACETAMINOPHEN 1 TABLET: 5; 325 TABLET ORAL at 12:42

## 2019-07-14 RX ADMIN — POLYETHYLENE GLYCOL (3350) 17 G: 17 POWDER, FOR SOLUTION ORAL at 09:54

## 2019-07-14 RX ADMIN — AMPICILLIN SODIUM AND SULBACTAM SODIUM 3 G: 2; 1 INJECTION, POWDER, FOR SOLUTION INTRAMUSCULAR; INTRAVENOUS at 09:54

## 2019-07-14 RX ADMIN — SODIUM CHLORIDE: 9 INJECTION, SOLUTION INTRAVENOUS at 21:12

## 2019-07-14 RX ADMIN — ENOXAPARIN SODIUM 40 MG: 40 INJECTION SUBCUTANEOUS at 09:54

## 2019-07-14 RX ADMIN — INSULIN LISPRO 4 UNITS: 100 INJECTION, SOLUTION INTRAVENOUS; SUBCUTANEOUS at 12:29

## 2019-07-14 RX ADMIN — INSULIN LISPRO 2 UNITS: 100 INJECTION, SOLUTION INTRAVENOUS; SUBCUTANEOUS at 09:55

## 2019-07-14 RX ADMIN — INSULIN LISPRO 1 UNITS: 100 INJECTION, SOLUTION INTRAVENOUS; SUBCUTANEOUS at 21:12

## 2019-07-14 ASSESSMENT — PAIN DESCRIPTION - LOCATION: LOCATION: FOOT

## 2019-07-14 ASSESSMENT — PAIN DESCRIPTION - PAIN TYPE: TYPE: SURGICAL PAIN

## 2019-07-14 ASSESSMENT — PAIN SCALES - GENERAL
PAINLEVEL_OUTOF10: 3
PAINLEVEL_OUTOF10: 4

## 2019-07-14 ASSESSMENT — PAIN DESCRIPTION - ORIENTATION: ORIENTATION: RIGHT

## 2019-07-14 NOTE — PROGRESS NOTES
base is positive for OM. Due to resolution of soft tissue infection and importance of the affected bone for ambulation, we will attempt IV abx prior to further resection as bone did not appear necrotic intraop.  -Will follow up new clearance margin.   -Evaluate tomorrow morning. If no worsening signs of infection, will be okay for discharge from podiatry standpoint. -NWB RLE    3) PVD  There is no evidence of arterial insufficiency at rest in the lower    extremities bilaterally.              4) DM with neuropathy  -Controlled A1c: 5.3  -A1c 13 in 2/2019     Eun Harris DPM  Office: [phone]  Cell: 1 [facility], [place]  7/14/2019

## 2019-07-14 NOTE — PROGRESS NOTES
from amputation of the 5th ray proximally at   the 5th metatarsal.         XR FOOT RIGHT (MIN 3 VIEWS)   Final Result   1. Age-indeterminate, possibly acute 5th metatarsal neck fracture, mildly   displaced. 2. Wound plantar aspect of the lateral portion right forefoot superficial to   the 5th metatarsal head with bone erosion at this level concerning for   osteomyelitis. MRI correlation recommended. 3. Diffuse edema lateral aspect of the right forefoot possibly related to   cellulitis. 4. Osteoarthritis. 5.  Vascular calcifications are noted reflecting calcific atherosclerosis. Assessment/Plan:    Active Hospital Problems    Diagnosis    Receiving intravenous antibiotic treatment as outpatient [Z79.2]    Weight loss counseling, encounter for [Z71.3]    Moderate malnutrition ([address] [E44.0]    High fever [R50.9]    Bandemia [D72.825]    Closed displaced fracture of fifth metatarsal bone of right foot [S92.351A]    Elevated sed rate [R70.0]    Elevated C-reactive protein (CRP) [R79.82]    Overweight [E66.3]    Failure of outpatient treatment [Z78.9]    Diabetic polyneuropathy associated with type 2 diabetes mellitus ([address] [E11.42]    Right foot infection [L08.9]    Sepsis ([address] [A41.9]    Poorly controlled type 2 diabetes mellitus ([address] [E11.65]    Hypertension associated with diabetes ([address] [E11.59, I10]    Type 2 diabetes mellitus ([address] [E11.9]     Sepsis due to right diabetic foot infection: Podiatry and ID assisting. S/p I&D of rt foot with 5th toe and partial metatarsal amputation on 7/8. Culture grew MSSA, Bacteroides. S/p  repeat  I& D on 7/11. MRI result reviewed. Continue abx per ID recs- cefazolin switched to IV unasyn to continue till  8/19/2019. PICC line in place         DM2: Last A1c in May 2019 was 6.7. Continue SSI     Urinary retention: improved after farooq placement by urology. Continue farooq till pt more mobile.        Moderate protein calorie

## 2019-07-15 VITALS
SYSTOLIC BLOOD PRESSURE: 170 MMHG | TEMPERATURE: 98.6 F | HEIGHT: 69 IN | OXYGEN SATURATION: 96 % | WEIGHT: 200 LBS | HEART RATE: 85 BPM | RESPIRATION RATE: 16 BRPM | BODY MASS INDEX: 29.62 KG/M2 | DIASTOLIC BLOOD PRESSURE: 84 MMHG

## 2019-07-15 LAB
ANION GAP SERPL CALCULATED.3IONS-SCNC: 9 MMOL/L (ref 3–16)
ATYPICAL LYMPHOCYTE RELATIVE PERCENT: 1 % (ref 0–6)
BANDED NEUTROPHILS RELATIVE PERCENT: 1 % (ref 0–7)
BASOPHILS ABSOLUTE: 0 K/UL (ref 0–0.2)
BASOPHILS RELATIVE PERCENT: 0 %
BUN BLDV-MCNC: 9 MG/DL (ref 7–20)
CALCIUM SERPL-MCNC: 8.8 MG/DL (ref 8.3–10.6)
CHLORIDE BLD-SCNC: 104 MMOL/L (ref 99–110)
CO2: 25 MMOL/L (ref 21–32)
CREAT SERPL-MCNC: 0.6 MG/DL (ref 0.9–1.3)
EOSINOPHILS ABSOLUTE: 0.4 K/UL (ref 0–0.6)
EOSINOPHILS RELATIVE PERCENT: 4 %
GFR AFRICAN AMERICAN: >60
GFR NON-AFRICAN AMERICAN: >60
GLUCOSE BLD-MCNC: 165 MG/DL (ref 70–99)
GLUCOSE BLD-MCNC: 174 MG/DL (ref 70–99)
GLUCOSE BLD-MCNC: 182 MG/DL (ref 70–99)
HCT VFR BLD CALC: 34.2 % (ref 40.5–52.5)
HEMOGLOBIN: 11.8 G/DL (ref 13.5–17.5)
LYMPHOCYTES ABSOLUTE: 2.9 K/UL (ref 1–5.1)
LYMPHOCYTES RELATIVE PERCENT: 26 %
MCH RBC QN AUTO: 30.5 PG (ref 26–34)
MCHC RBC AUTO-ENTMCNC: 34.5 G/DL (ref 31–36)
MCV RBC AUTO: 88.5 FL (ref 80–100)
MONOCYTES ABSOLUTE: 0.1 K/UL (ref 0–1.3)
MONOCYTES RELATIVE PERCENT: 1 %
NEUTROPHILS ABSOLUTE: 7.4 K/UL (ref 1.7–7.7)
NEUTROPHILS RELATIVE PERCENT: 67 %
PDW BLD-RTO: 13 % (ref 12.4–15.4)
PERFORMED ON: ABNORMAL
PERFORMED ON: ABNORMAL
PLATELET # BLD: 405 K/UL (ref 135–450)
PLATELET SLIDE REVIEW: ADEQUATE
PMV BLD AUTO: 6.2 FL (ref 5–10.5)
POTASSIUM SERPL-SCNC: 4 MMOL/L (ref 3.5–5.1)
RBC # BLD: 3.86 M/UL (ref 4.2–5.9)
SLIDE REVIEW: ABNORMAL
SODIUM BLD-SCNC: 138 MMOL/L (ref 136–145)
WBC # BLD: 10.9 K/UL (ref 4–11)

## 2019-07-15 PROCEDURE — 2580000003 HC RX 258: Performed by: INTERNAL MEDICINE

## 2019-07-15 PROCEDURE — 97535 SELF CARE MNGMENT TRAINING: CPT

## 2019-07-15 PROCEDURE — 6370000000 HC RX 637 (ALT 250 FOR IP): Performed by: PODIATRIST

## 2019-07-15 PROCEDURE — 99232 SBSQ HOSP IP/OBS MODERATE 35: CPT | Performed by: INTERNAL MEDICINE

## 2019-07-15 PROCEDURE — 6360000002 HC RX W HCPCS: Performed by: INTERNAL MEDICINE

## 2019-07-15 PROCEDURE — 97110 THERAPEUTIC EXERCISES: CPT

## 2019-07-15 PROCEDURE — 6370000000 HC RX 637 (ALT 250 FOR IP): Performed by: INTERNAL MEDICINE

## 2019-07-15 PROCEDURE — 80048 BASIC METABOLIC PNL TOTAL CA: CPT

## 2019-07-15 PROCEDURE — 97116 GAIT TRAINING THERAPY: CPT

## 2019-07-15 PROCEDURE — 2580000003 HC RX 258: Performed by: PODIATRIST

## 2019-07-15 PROCEDURE — 6360000002 HC RX W HCPCS: Performed by: PODIATRIST

## 2019-07-15 PROCEDURE — 85025 COMPLETE CBC W/AUTO DIFF WBC: CPT

## 2019-07-15 PROCEDURE — 97166 OT EVAL MOD COMPLEX 45 MIN: CPT

## 2019-07-15 PROCEDURE — 97161 PT EVAL LOW COMPLEX 20 MIN: CPT

## 2019-07-15 RX ORDER — HYDROCODONE BITARTRATE AND ACETAMINOPHEN 5; 325 MG/1; MG/1
1 TABLET ORAL EVERY 6 HOURS PRN
Qty: 12 TABLET | Refills: 0 | Status: SHIPPED | OUTPATIENT
Start: 2019-07-15 | End: 2019-07-18

## 2019-07-15 RX ORDER — ERTAPENEM 1 G/1
1 INJECTION, POWDER, LYOPHILIZED, FOR SOLUTION INTRAMUSCULAR; INTRAVENOUS ONCE
Status: DISCONTINUED | OUTPATIENT
Start: 2019-07-15 | End: 2019-07-15

## 2019-07-15 RX ORDER — FINASTERIDE 5 MG/1
5 TABLET, FILM COATED ORAL DAILY
Qty: 30 TABLET | Refills: 3 | Status: SHIPPED | OUTPATIENT
Start: 2019-07-16 | End: 2020-12-11 | Stop reason: ALTCHOICE

## 2019-07-15 RX ORDER — POLYETHYLENE GLYCOL 3350 17 G/17G
17 POWDER, FOR SOLUTION ORAL DAILY
Qty: 527 G | Refills: 1 | Status: SHIPPED | OUTPATIENT
Start: 2019-07-16 | End: 2019-08-15

## 2019-07-15 RX ORDER — TAMSULOSIN HYDROCHLORIDE 0.4 MG/1
0.4 CAPSULE ORAL DAILY
Qty: 30 CAPSULE | Refills: 3 | Status: SHIPPED | OUTPATIENT
Start: 2019-07-16 | End: 2020-12-11

## 2019-07-15 RX ADMIN — Medication 10 ML: at 08:43

## 2019-07-15 RX ADMIN — Medication 10 ML: at 14:50

## 2019-07-15 RX ADMIN — ENOXAPARIN SODIUM 40 MG: 40 INJECTION SUBCUTANEOUS at 09:39

## 2019-07-15 RX ADMIN — AMPICILLIN SODIUM AND SULBACTAM SODIUM 3 G: 2; 1 INJECTION, POWDER, FOR SOLUTION INTRAMUSCULAR; INTRAVENOUS at 08:16

## 2019-07-15 RX ADMIN — INSULIN LISPRO 2 UNITS: 100 INJECTION, SOLUTION INTRAVENOUS; SUBCUTANEOUS at 09:39

## 2019-07-15 RX ADMIN — POLYETHYLENE GLYCOL (3350) 17 G: 17 POWDER, FOR SOLUTION ORAL at 09:37

## 2019-07-15 RX ADMIN — Medication 1 CAPSULE: at 09:38

## 2019-07-15 RX ADMIN — AMPICILLIN SODIUM AND SULBACTAM SODIUM 3 G: 2; 1 INJECTION, POWDER, FOR SOLUTION INTRAMUSCULAR; INTRAVENOUS at 01:50

## 2019-07-15 RX ADMIN — ERTAPENEM SODIUM 1000 MG: 1 INJECTION, POWDER, LYOPHILIZED, FOR SOLUTION INTRAMUSCULAR; INTRAVENOUS at 12:47

## 2019-07-15 RX ADMIN — TAMSULOSIN HYDROCHLORIDE 0.4 MG: 0.4 CAPSULE ORAL at 09:38

## 2019-07-15 RX ADMIN — FINASTERIDE 5 MG: 5 TABLET, FILM COATED ORAL at 09:38

## 2019-07-15 RX ADMIN — INSULIN LISPRO 2 UNITS: 100 INJECTION, SOLUTION INTRAVENOUS; SUBCUTANEOUS at 12:49

## 2019-07-15 NOTE — PROGRESS NOTES
mcg/mL     ciprofloxacin Sensitive 1 mcg/mL     clindamycin Sensitive <=0.25 mcg/mL     erythromycin Sensitive <=0.25 mcg/mL     oxacillin Sensitive 0.5 mcg/mL     tetracycline Sensitive <=1 mcg/mL     trimethoprim-sulfamethoxazole Sensitive <=10 mcg/mL       7/8 Surgical culture R foot MSSA, B fragilis    MRSA screen neg       Surgical path:  FINAL DIAGNOSIS:     A. Bone, right foot, 5th metatarsal, excision:       - Portion of bone with acute osteomyelitis.       B. Bone, right foot, 5th metatarsal, clean margin, excision:       - Portion of bone with acute osteomyelitis. IMAGING:  MRI R foot 7/13:      Impression   Large area of ulceration along the lateral aspect of the foot.  Peripheral   enhancement suggests this could be an abscess.  Alternatively this could   represent devascularized tissue.       Osteomyelitis involving the remaining 5th metatarsal base.        Assessment:     Patient Active Problem List    Diagnosis Date Noted    Receiving intravenous antibiotic treatment as outpatient     Weight loss counseling, encounter for     High fever     Bandemia     Closed displaced fracture of fifth metatarsal bone of right foot     Elevated sed rate     Elevated C-reactive protein (CRP)     Overweight     Failure of outpatient treatment     Diabetic polyneuropathy associated with type 2 diabetes mellitus ([address]     Osteomyelitis ([facility] 07/07/2019    Right foot infection 07/07/2019    Sepsis ([address] 07/07/2019    Uncontrolled type 2 diabetes mellitus with hyperglycemia ([address] 07/07/2019    Hypertension associated with diabetes ([facility] 03/26/2019    Type 2 diabetes mellitus ([facility] 03/26/2019       DM, well controlled, A1c 5.9     R DFI with abscess, septic arthritis 5th MTPJ, OM 5th ray  OR 7/8 - I&D, partial 5th ray amputation   OR 7/11 - I&D and 5th met resection  Isolation MSSA and B fragilis in culture  Path of clearance fragment positive for OM     Urinary retention with farooq in place     No abx

## 2019-07-15 NOTE — PROGRESS NOTES
Occupational Therapy   Occupational Therapy Initial Assessment and Treatment Note 1x  Date: 7/15/2019   Patient Name: Vaibhav Nance  MRN: [id number]     : 1965    Date of Service: 7/15/2019    Discharge Recommendations:  Home with assist PRN     Assessment   Assessment: OT eval completed. Pt admitted for diabetic ulcer R foot and is s/p 5th metatarsal resection (NWB restrictions). Pt is presently SBA for toilet transfers with SW. He demo's ability to reach to LE to complete ADL tasks from a seated positon. Pt is to have assistance as needed from ex-wife and daughter. He plans to sponge bathe initially until cleared by MD for showers. No further OT. Prognosis: Good  Decision Making: Medium Complexity  Patient Education: role of OT, transfers, ADLs, safety  REQUIRES OT FOLLOW UP: No  Activity Tolerance  Activity Tolerance: Patient Tolerated treatment well  Safety Devices  Safety Devices in place: Yes  Type of devices: Left in bed;Bed alarm in place;Call light within reach;Gait belt;Nurse notified         Patient Diagnosis(es): The primary encounter diagnosis was Septicemia ([address] Diagnoses of Other acute osteomyelitis of right foot ([address] Closed displaced fracture of fifth metatarsal bone of right foot, initial encounter, Cellulitis of right foot, and Fever, unspecified fever cause were also pertinent to this visit. has a past medical history of Diabetes mellitus ([address] and Foot ulcer ([address] has a past surgical history that includes orthopedic surgery; Ankle surgery; Toe amputation (Right, 2019); and Foot Debridement (Right, 2019).     Restrictions  Restrictions/Precautions  Restrictions/Precautions: Weight Bearing, General Precautions, Fall Risk, Up as Tolerated  Required Braces or Orthoses?: Yes(post op shoe for RLE)  Lower Extremity Weight Bearing Restrictions  Right Lower Extremity Weight Bearing: Non Weight Bearing    Subjective   General  Chart Reviewed: Yes  Patient assessed for

## 2019-07-15 NOTE — DISCHARGE SUMMARY
daily  Qty: 527 g, Refills: 1      finasteride (PROSCAR) 5 MG tablet Take 1 tablet by mouth daily  Qty: 30 tablet, Refills: 3      tamsulosin (FLOMAX) 0.4 MG capsule Take 1 capsule by mouth daily  Qty: 30 capsule, Refills: 3              Details   glipiZIDE (GLUCOTROL) 5 MG tablet Take 15 mg by mouth daily as needed      cyclobenzaprine (FLEXERIL) 10 MG tablet Take 10 mg by mouth 3 times daily as needed             Time Spent on discharge is more than 45 minutes in the examination, evaluation, counseling and review of medications and discharge plan. Signed:    Home Denise MD   7/15/2019      Thank you Isla Cranker, MD, MD for the opportunity to be involved in this patient's care. If you have any questions or concerns please feel free to contact me at [phone].

## 2019-07-15 NOTE — PROGRESS NOTES
mL/hr Intravenous PRN Whitney Nyhan, DPM        sodium chloride flush 0.9 % injection 10 mL  10 mL Intravenous 2 times per day Whitney Nyhan, DPM   10 mL at 07/14/19 0955    sodium chloride flush 0.9 % injection 10 mL  10 mL Intravenous PRN Whitney Nyhan, DPM        magnesium hydroxide (MILK OF MAGNESIA) 400 MG/5ML suspension 30 mL  30 mL Oral Daily PRN Ladan Cortesn, DPM        ondansetron (ZOFRAN) injection 4 mg  4 mg Intravenous Q6H PRN Whitney Nyhan, DPM        enoxaparin (LOVENOX) injection 40 mg  40 mg Subcutaneous Daily Ladan Cortesn, DPM   40 mg at 07/14/19 0954    0.9 % sodium chloride infusion   Intravenous Continuous Whitney Nyhan,  mL/hr at 07/14/19 2112      acetaminophen (TYLENOL) tablet 650 mg  650 mg Oral Q4H PRN Whitney Nyhan, DPM   650 mg at 07/09/19 2110    morphine (PF) injection 2 mg  2 mg Intravenous Q2H PRN Whitney Nyhan, DPM   2 mg at 07/08/19 2205    Or    morphine (PF) injection 4 mg  4 mg Intravenous Q2H PRN Whitney Nyhan, DPM   4 mg at 07/13/19 1045     Allergies   Allergen Reactions    Metformin And Related Diarrhea     Active Problems:    Right foot infection    Hypertension associated with diabetes ([facility]    Type 2 diabetes mellitus (H[facility])    Sepsis ([facility]    Poorly controlled type 2 diabetes mellitus (HCC)    High fever    Bandemia    Closed displaced fracture of fifth metatarsal bone of right foot    Elevated sed rate    Elevated C-reactive protein (CRP)    Overweight    Failure of outpatient treatment    Diabetic polyneuropathy associated with type 2 diabetes mellitus ([facility]    Moderate malnutrition ([facility]    Receiving intravenous antibiotic treatment as outpatient    Weight loss counseling, encounter for  Resolved Problems:    * No resolved hospital problems. *    Blood pressure (!) 172/95, pulse 87, temperature 98.2 °F (36.8 °C), temperature source Oral, resp.  rate 16, height 5' 9\" (1.753 m), weight 200 lb (90.7 kg), SpO2 99 %.    Subjective  Objective:  Vital signs: (most recent): Blood pressure (!) 172/95, pulse 87, temperature 98.2 °F (36.8 °C), temperature source Oral, resp. rate 16, height 5' 9\" (1.753 m), weight 200 lb (90.7 kg), SpO2 99 %. Integument:right foot: 1cm x 1cmx 0.5cm full thickness wound to plantar 5th MTP. Dorsal incision to right 5th ray 100% approximated with sutures. Erythema resolved. Serosanguinous drainage dorsally to wound, no purulence seen  Neurologic:  Protective sensation is grossly diminished  to light touch at the level of the toes, bilateral.  Vascular:  DP and PT pulses are barely palpable, bilateral.  CFT is brisk to all toes. No significant edema appreciated. Musculoskeletal: s/p partial 5th ray resection, right    WBC:13.0->11.8->12->11.4->12.4->10.9  Wound Culture: MSSA  Surgical culture abscess:MSSABacteroides fragilis   Surgical Culture wound: MSSABacteroides fragilis   Surgical pathology  -5th toe: acute OM  -5th metatarsal acute OM  clearance margin: acute OM  Surgical Pathology: 7/11/19  Clearance margin:pending  5th metatarsal:pending    Xray: s/p partial 5th metatarsal and 5th toe amputation    MRI:  Large area of ulceration along the lateral aspect of the foot.  Peripheral   enhancement suggests this could be an abscess.  Alternatively this could   represent devascularized tissue.       Osteomyelitis involving the remaining 5th metatarsal base. Assessment & Plan  Thank you for the opportunity to take part in this patient's care. Please call me with any questions. 1) Sepsis  -Continue IV abx per ID,   -fevers resolved    2) Diabetic foot wound with abscess, OM  -Erythema resolved today. Elevation in white count resolved with abx change  -MRI shows remaining 5th metatarsal base is positive for OM.  Due to resolution of soft tissue infection and importance of the affected bone for ambulation, we will attempt IV abx prior to further resection as bone did not appear necrotic

## 2019-07-15 NOTE — PROGRESS NOTES
and be able to get around my apt\"       Therapy Time   Individual Concurrent Group Co-treatment   Time In 5683         Time Out 1340         Minutes 35         Timed Code Treatment Minutes: [address], [id number]

## 2019-07-22 LAB
ALBUMIN SERPL-MCNC: 3.8 G/DL
ALP BLD-CCNC: 148 U/L
ALT SERPL-CCNC: 24 U/L
ANION GAP SERPL CALCULATED.3IONS-SCNC: NORMAL MMOL/L
AST SERPL-CCNC: 29 U/L
BASOPHILS ABSOLUTE: 0.1 /ΜL
BASOPHILS RELATIVE PERCENT: 1 %
BILIRUB SERPL-MCNC: 0.4 MG/DL (ref 0.1–1.4)
BUN BLDV-MCNC: 19 MG/DL
C-REACTIVE PROTEIN: 29
CALCIUM SERPL-MCNC: 9.2 MG/DL
CHLORIDE BLD-SCNC: 101 MMOL/L
CO2: 22 MMOL/L
CREAT SERPL-MCNC: 0.86 MG/DL
EOSINOPHILS ABSOLUTE: 0.2 /ΜL
EOSINOPHILS RELATIVE PERCENT: 2 %
GFR CALCULATED: NORMAL
GLUCOSE BLD-MCNC: 143 MG/DL
HCT VFR BLD CALC: 37.2 % (ref 41–53)
HEMOGLOBIN: 12.1 G/DL (ref 13.5–17.5)
LYMPHOCYTES ABSOLUTE: 3 /ΜL
LYMPHOCYTES RELATIVE PERCENT: 29 %
MCH RBC QN AUTO: 28.9 PG
MCHC RBC AUTO-ENTMCNC: 32.5 G/DL
MCV RBC AUTO: 89 FL
MONOCYTES ABSOLUTE: 0.6 /ΜL
MONOCYTES RELATIVE PERCENT: 6 %
NEUTROPHILS ABSOLUTE: 6.5 /ΜL
NEUTROPHILS RELATIVE PERCENT: 62 %
PLATELET # BLD: 467 K/ΜL
PMV BLD AUTO: ABNORMAL FL
POTASSIUM SERPL-SCNC: 4.8 MMOL/L
RBC # BLD: 4.18 10^6/ΜL
SEDIMENTATION RATE, ERYTHROCYTE: 70
SODIUM BLD-SCNC: 139 MMOL/L
TOTAL PROTEIN: 6.8
WBC # BLD: 10.4 10^3/ML

## 2019-07-29 LAB
ALBUMIN SERPL-MCNC: 3.6 G/DL
ALP BLD-CCNC: 145 U/L
ALT SERPL-CCNC: 20 U/L
ANION GAP SERPL CALCULATED.3IONS-SCNC: NORMAL MMOL/L
AST SERPL-CCNC: 19 U/L
BILIRUB SERPL-MCNC: 0.3 MG/DL (ref 0.1–1.4)
BUN BLDV-MCNC: 12 MG/DL
C-REACTIVE PROTEIN: 13
CALCIUM SERPL-MCNC: 9.6 MG/DL
CHLORIDE BLD-SCNC: 106 MMOL/L
CO2: 20 MMOL/L
CREAT SERPL-MCNC: 0.77 MG/DL
GFR CALCULATED: NORMAL
GLUCOSE BLD-MCNC: 102 MG/DL
HCT VFR BLD CALC: 36.3 % (ref 41–53)
HEMOGLOBIN: 12.3 G/DL (ref 13.5–17.5)
PLATELET # BLD: 397 K/ΜL
POTASSIUM SERPL-SCNC: 5 MMOL/L
SEDIMENTATION RATE, ERYTHROCYTE: 72
SODIUM BLD-SCNC: 141 MMOL/L
TOTAL PROTEIN: 7.1
WBC # BLD: 8.7 10^3/ML

## 2019-08-05 LAB
ALBUMIN SERPL-MCNC: 3.8 G/DL
ALP BLD-CCNC: 118 U/L
ALT SERPL-CCNC: 18 U/L
ANION GAP SERPL CALCULATED.3IONS-SCNC: 1.3 MMOL/L
AST SERPL-CCNC: 18 U/L
BASOPHILS ABSOLUTE: ABNORMAL /ΜL
BASOPHILS RELATIVE PERCENT: ABNORMAL %
BILIRUB SERPL-MCNC: 0.3 MG/DL (ref 0.1–1.4)
BUN BLDV-MCNC: 20 MG/DL
C-REACTIVE PROTEIN: 11
CALCIUM SERPL-MCNC: 9.1 MG/DL
CHLORIDE BLD-SCNC: 107 MMOL/L
CO2: 17 MMOL/L
CREAT SERPL-MCNC: 0.93 MG/DL
EOSINOPHILS ABSOLUTE: ABNORMAL /ΜL
EOSINOPHILS RELATIVE PERCENT: ABNORMAL %
GFR CALCULATED: 93
GLUCOSE BLD-MCNC: 157 MG/DL
HCT VFR BLD CALC: 36.7 % (ref 41–53)
HEMOGLOBIN: 12.5 G/DL (ref 13.5–17.5)
LYMPHOCYTES ABSOLUTE: ABNORMAL /ΜL
LYMPHOCYTES RELATIVE PERCENT: ABNORMAL %
MCH RBC QN AUTO: 29.5 PG
MCHC RBC AUTO-ENTMCNC: 34.1 G/DL
MCV RBC AUTO: 87 FL
MONOCYTES ABSOLUTE: ABNORMAL /ΜL
MONOCYTES RELATIVE PERCENT: ABNORMAL %
NEUTROPHILS ABSOLUTE: ABNORMAL /ΜL
NEUTROPHILS RELATIVE PERCENT: ABNORMAL %
PLATELET # BLD: 320 K/ΜL
PMV BLD AUTO: 320 FL
POTASSIUM SERPL-SCNC: 4.9 MMOL/L
RBC # BLD: 4.24 10^6/ΜL
SEDIMENTATION RATE, ERYTHROCYTE: 50
SODIUM BLD-SCNC: 140 MMOL/L
TOTAL PROTEIN: 6.7
WBC # BLD: 7.4 10^3/ML

## 2019-08-12 ENCOUNTER — TELEPHONE (OUTPATIENT)
Dept: INFECTIOUS DISEASES | Age: 54
End: 2019-08-12

## 2019-08-12 LAB
ALBUMIN SERPL-MCNC: 3.4 G/DL
ALP BLD-CCNC: 112 U/L
ALT SERPL-CCNC: 12 U/L
ANION GAP SERPL CALCULATED.3IONS-SCNC: NORMAL MMOL/L
AST SERPL-CCNC: 16 U/L
BILIRUB SERPL-MCNC: 0.3 MG/DL (ref 0.1–1.4)
BUN BLDV-MCNC: 14 MG/DL
C-REACTIVE PROTEIN: 11
CALCIUM SERPL-MCNC: 8.9 MG/DL
CHLORIDE BLD-SCNC: 106 MMOL/L
CO2: 19 MMOL/L
CREAT SERPL-MCNC: 0.8 MG/DL
GFR CALCULATED: NORMAL
GLUCOSE BLD-MCNC: 195 MG/DL
HCT VFR BLD CALC: 37.7 % (ref 41–53)
HEMOGLOBIN: 12.5 G/DL (ref 13.5–17.5)
PLATELET # BLD: 337 K/ΜL
POTASSIUM SERPL-SCNC: 4.8 MMOL/L
SEDIMENTATION RATE, ERYTHROCYTE: 55
SODIUM BLD-SCNC: 141 MMOL/L
TOTAL PROTEIN: 6.2
WBC # BLD: 8.9 10^3/ML

## 2019-08-12 NOTE — TELEPHONE ENCOUNTER
Patient made an appointment for 9/3/19, however is expecting term date of 8/19/19 for his IV ATB. Patient was then wanting to return to work at that time. Does he need an earlier appt?

## 2019-08-19 ENCOUNTER — TELEPHONE (OUTPATIENT)
Dept: INFECTIOUS DISEASES | Age: 54
End: 2019-08-19

## 2019-08-19 ENCOUNTER — HOSPITAL ENCOUNTER (OUTPATIENT)
Age: 54
Setting detail: SPECIMEN
Discharge: HOME | End: 2019-08-19
Payer: COMMERCIAL

## 2019-08-19 LAB
BASOPHILS ABSOLUTE: 0.1 K/UL (ref 0–0.2)
BASOPHILS RELATIVE PERCENT: 1.2 %
EOSINOPHILS ABSOLUTE: 0.2 K/UL (ref 0–0.6)
EOSINOPHILS RELATIVE PERCENT: 2.7 %
HCT VFR BLD CALC: 39.1 % (ref 40.5–52.5)
HEMOGLOBIN: 13.3 G/DL (ref 13.5–17.5)
LYMPHOCYTES ABSOLUTE: 3.1 K/UL (ref 1–5.1)
LYMPHOCYTES RELATIVE PERCENT: 36.4 %
MCH RBC QN AUTO: 30.3 PG (ref 26–34)
MCHC RBC AUTO-ENTMCNC: 34.1 G/DL (ref 31–36)
MCV RBC AUTO: 88.9 FL (ref 80–100)
MONOCYTES ABSOLUTE: 0.5 K/UL (ref 0–1.3)
MONOCYTES RELATIVE PERCENT: 6.3 %
NEUTROPHILS ABSOLUTE: 4.5 K/UL (ref 1.7–7.7)
NEUTROPHILS RELATIVE PERCENT: 53.4 %
PDW BLD-RTO: 13.8 % (ref 12.4–15.4)
PLATELET # BLD: 320 K/UL (ref 135–450)
PMV BLD AUTO: 7.8 FL (ref 5–10.5)
RBC # BLD: 4.4 M/UL (ref 4.2–5.9)
WBC # BLD: 8.5 K/UL (ref 4–11)

## 2019-08-19 PROCEDURE — 80053 COMPREHEN METABOLIC PANEL: CPT

## 2019-08-19 PROCEDURE — 85652 RBC SED RATE AUTOMATED: CPT

## 2019-08-19 PROCEDURE — 85025 COMPLETE CBC W/AUTO DIFF WBC: CPT

## 2019-08-19 PROCEDURE — 86140 C-REACTIVE PROTEIN: CPT

## 2019-08-19 PROCEDURE — 36415 COLL VENOUS BLD VENIPUNCTURE: CPT

## 2019-08-20 LAB
A/G RATIO: 1.5 (ref 1.1–2.2)
ALBUMIN SERPL-MCNC: 4 G/DL (ref 3.4–5)
ALP BLD-CCNC: 117 U/L (ref 40–129)
ALT SERPL-CCNC: 14 U/L (ref 10–40)
ANION GAP SERPL CALCULATED.3IONS-SCNC: 19 MMOL/L (ref 3–16)
AST SERPL-CCNC: 17 U/L (ref 15–37)
BILIRUB SERPL-MCNC: 0.4 MG/DL (ref 0–1)
BUN BLDV-MCNC: 14 MG/DL (ref 7–20)
C-REACTIVE PROTEIN: 9.4 MG/L (ref 0–5.1)
CALCIUM SERPL-MCNC: 9.6 MG/DL (ref 8.3–10.6)
CHLORIDE BLD-SCNC: 99 MMOL/L (ref 99–110)
CO2: 21 MMOL/L (ref 21–32)
CREAT SERPL-MCNC: 0.8 MG/DL (ref 0.9–1.3)
GFR AFRICAN AMERICAN: >60
GFR NON-AFRICAN AMERICAN: >60
GLOBULIN: 2.7 G/DL
GLUCOSE BLD-MCNC: 145 MG/DL (ref 70–99)
POTASSIUM SERPL-SCNC: 4.3 MMOL/L (ref 3.5–5.1)
SEDIMENTATION RATE, ERYTHROCYTE: 34 MM/HR (ref 0–20)
SODIUM BLD-SCNC: 139 MMOL/L (ref 136–145)
TOTAL PROTEIN: 6.7 G/DL (ref 6.4–8.2)

## 2019-09-03 ENCOUNTER — OFFICE VISIT (OUTPATIENT)
Dept: INFECTIOUS DISEASES | Age: 54
End: 2019-09-03
Payer: COMMERCIAL

## 2019-09-03 VITALS
TEMPERATURE: 98.2 F | HEART RATE: 96 BPM | WEIGHT: 198.6 LBS | HEIGHT: 71 IN | OXYGEN SATURATION: 99 % | BODY MASS INDEX: 27.8 KG/M2

## 2019-09-03 DIAGNOSIS — L02.611 ABSCESS OF RIGHT FOOT: Primary | ICD-10-CM

## 2019-09-03 DIAGNOSIS — M86.671 CHRONIC OSTEOMYELITIS OF RIGHT FOOT (HCC): ICD-10-CM

## 2019-09-03 PROCEDURE — 99213 OFFICE O/P EST LOW 20 MIN: CPT | Performed by: INTERNAL MEDICINE

## 2019-11-24 ENCOUNTER — HOSPITAL ENCOUNTER (EMERGENCY)
Age: 54
Discharge: HOME | End: 2019-11-24
Payer: COMMERCIAL

## 2019-11-24 ENCOUNTER — APPOINTMENT (OUTPATIENT)
Dept: GENERAL RADIOLOGY | Age: 54
End: 2019-11-24
Payer: COMMERCIAL

## 2019-11-24 VITALS
BODY MASS INDEX: 29.62 KG/M2 | WEIGHT: 200 LBS | TEMPERATURE: 98.3 F | HEIGHT: 69 IN | OXYGEN SATURATION: 99 % | RESPIRATION RATE: 20 BRPM | HEART RATE: 73 BPM | SYSTOLIC BLOOD PRESSURE: 151 MMHG | DIASTOLIC BLOOD PRESSURE: 84 MMHG

## 2019-11-24 DIAGNOSIS — L03.116 CELLULITIS OF LEFT FOOT: ICD-10-CM

## 2019-11-24 DIAGNOSIS — L97.529 DIABETIC ULCER OF OTHER PART OF LEFT FOOT ASSOCIATED WITH TYPE 2 DIABETES MELLITUS, UNSPECIFIED ULCER STAGE (HCC): Primary | ICD-10-CM

## 2019-11-24 DIAGNOSIS — E11.621 DIABETIC ULCER OF OTHER PART OF LEFT FOOT ASSOCIATED WITH TYPE 2 DIABETES MELLITUS, UNSPECIFIED ULCER STAGE (HCC): Primary | ICD-10-CM

## 2019-11-24 LAB
A/G RATIO: 1.1 (ref 1.1–2.2)
ALBUMIN SERPL-MCNC: 4 G/DL (ref 3.4–5)
ALP BLD-CCNC: 108 U/L (ref 40–129)
ALT SERPL-CCNC: 8 U/L (ref 10–40)
ANION GAP SERPL CALCULATED.3IONS-SCNC: 10 MMOL/L (ref 3–16)
AST SERPL-CCNC: 12 U/L (ref 15–37)
BASOPHILS ABSOLUTE: 0.1 K/UL (ref 0–0.2)
BASOPHILS RELATIVE PERCENT: 0.9 %
BILIRUB SERPL-MCNC: 0.6 MG/DL (ref 0–1)
BUN BLDV-MCNC: 17 MG/DL (ref 7–20)
CALCIUM SERPL-MCNC: 9.4 MG/DL (ref 8.3–10.6)
CHLORIDE BLD-SCNC: 101 MMOL/L (ref 99–110)
CO2: 26 MMOL/L (ref 21–32)
CREAT SERPL-MCNC: 0.8 MG/DL (ref 0.9–1.3)
EOSINOPHILS ABSOLUTE: 0.2 K/UL (ref 0–0.6)
EOSINOPHILS RELATIVE PERCENT: 2 %
GFR AFRICAN AMERICAN: >60
GFR NON-AFRICAN AMERICAN: >60
GLOBULIN: 3.6 G/DL
GLUCOSE BLD-MCNC: 221 MG/DL (ref 70–99)
HCT VFR BLD CALC: 39.7 % (ref 40.5–52.5)
HEMOGLOBIN: 13.3 G/DL (ref 13.5–17.5)
LACTIC ACID: 0.8 MMOL/L (ref 0.4–2)
LYMPHOCYTES ABSOLUTE: 2.7 K/UL (ref 1–5.1)
LYMPHOCYTES RELATIVE PERCENT: 29.7 %
MCH RBC QN AUTO: 30.5 PG (ref 26–34)
MCHC RBC AUTO-ENTMCNC: 33.6 G/DL (ref 31–36)
MCV RBC AUTO: 91 FL (ref 80–100)
MONOCYTES ABSOLUTE: 0.7 K/UL (ref 0–1.3)
MONOCYTES RELATIVE PERCENT: 7.8 %
NEUTROPHILS ABSOLUTE: 5.4 K/UL (ref 1.7–7.7)
NEUTROPHILS RELATIVE PERCENT: 59.6 %
PDW BLD-RTO: 13.5 % (ref 12.4–15.4)
PLATELET # BLD: 318 K/UL (ref 135–450)
PMV BLD AUTO: 7.5 FL (ref 5–10.5)
POTASSIUM REFLEX MAGNESIUM: 4.2 MMOL/L (ref 3.5–5.1)
RBC # BLD: 4.37 M/UL (ref 4.2–5.9)
SEDIMENTATION RATE, ERYTHROCYTE: 48 MM/HR (ref 0–20)
SODIUM BLD-SCNC: 137 MMOL/L (ref 136–145)
TOTAL PROTEIN: 7.6 G/DL (ref 6.4–8.2)
WBC # BLD: 9.1 K/UL (ref 4–11)

## 2019-11-24 PROCEDURE — 87040 BLOOD CULTURE FOR BACTERIA: CPT

## 2019-11-24 PROCEDURE — 83605 ASSAY OF LACTIC ACID: CPT

## 2019-11-24 PROCEDURE — 85025 COMPLETE CBC W/AUTO DIFF WBC: CPT

## 2019-11-24 PROCEDURE — 85652 RBC SED RATE AUTOMATED: CPT

## 2019-11-24 PROCEDURE — 80053 COMPREHEN METABOLIC PANEL: CPT

## 2019-11-24 PROCEDURE — 6370000000 HC RX 637 (ALT 250 FOR IP): Performed by: NURSE PRACTITIONER

## 2019-11-24 PROCEDURE — 99283 EMERGENCY DEPT VISIT LOW MDM: CPT

## 2019-11-24 PROCEDURE — 73630 X-RAY EXAM OF FOOT: CPT

## 2019-11-24 PROCEDURE — 86140 C-REACTIVE PROTEIN: CPT

## 2019-11-24 RX ORDER — CEPHALEXIN 500 MG/1
500 CAPSULE ORAL 4 TIMES DAILY
Qty: 40 CAPSULE | Refills: 0 | Status: SHIPPED | OUTPATIENT
Start: 2019-11-24 | End: 2019-12-04

## 2019-11-24 RX ORDER — CEPHALEXIN 250 MG/1
500 CAPSULE ORAL ONCE
Status: COMPLETED | OUTPATIENT
Start: 2019-11-24 | End: 2019-11-24

## 2019-11-24 RX ORDER — SULFAMETHOXAZOLE AND TRIMETHOPRIM 800; 160 MG/1; MG/1
1 TABLET ORAL 2 TIMES DAILY
Qty: 20 TABLET | Refills: 0 | Status: SHIPPED | OUTPATIENT
Start: 2019-11-24 | End: 2019-12-04

## 2019-11-24 RX ORDER — SULFAMETHOXAZOLE AND TRIMETHOPRIM 800; 160 MG/1; MG/1
1 TABLET ORAL ONCE
Status: COMPLETED | OUTPATIENT
Start: 2019-11-24 | End: 2019-11-24

## 2019-11-24 RX ADMIN — CEPHALEXIN 500 MG: 250 CAPSULE ORAL at 15:33

## 2019-11-24 RX ADMIN — SULFAMETHOXAZOLE AND TRIMETHOPRIM 1 TABLET: 800; 160 TABLET ORAL at 15:33

## 2019-11-24 ASSESSMENT — PAIN DESCRIPTION - LOCATION
LOCATION: FOOT
LOCATION: FOOT

## 2019-11-24 ASSESSMENT — PAIN SCALES - GENERAL
PAINLEVEL_OUTOF10: 5
PAINLEVEL_OUTOF10: 7

## 2019-11-24 ASSESSMENT — PAIN DESCRIPTION - ORIENTATION
ORIENTATION: LEFT
ORIENTATION: LEFT

## 2019-11-25 LAB — C-REACTIVE PROTEIN: 44.1 MG/L (ref 0–5.1)

## 2019-11-29 LAB
BLOOD CULTURE, ROUTINE: NORMAL
CULTURE, BLOOD 2: NORMAL

## 2019-12-18 ENCOUNTER — HOSPITAL ENCOUNTER (OUTPATIENT)
Age: 54
Discharge: HOME | End: 2019-12-18
Payer: COMMERCIAL

## 2019-12-18 LAB
BASOPHILS ABSOLUTE: 0.1 K/UL (ref 0–0.2)
BASOPHILS RELATIVE PERCENT: 0.9 %
C-REACTIVE PROTEIN: 9.3 MG/L (ref 0–5.1)
EOSINOPHILS ABSOLUTE: 0.2 K/UL (ref 0–0.6)
EOSINOPHILS RELATIVE PERCENT: 2.4 %
HCT VFR BLD CALC: 44.1 % (ref 40.5–52.5)
HEMOGLOBIN: 14.7 G/DL (ref 13.5–17.5)
LYMPHOCYTES ABSOLUTE: 3.1 K/UL (ref 1–5.1)
LYMPHOCYTES RELATIVE PERCENT: 34.1 %
MCH RBC QN AUTO: 30.3 PG (ref 26–34)
MCHC RBC AUTO-ENTMCNC: 33.4 G/DL (ref 31–36)
MCV RBC AUTO: 90.9 FL (ref 80–100)
MONOCYTES ABSOLUTE: 0.6 K/UL (ref 0–1.3)
MONOCYTES RELATIVE PERCENT: 6.1 %
NEUTROPHILS ABSOLUTE: 5.1 K/UL (ref 1.7–7.7)
NEUTROPHILS RELATIVE PERCENT: 56.5 %
PDW BLD-RTO: 13 % (ref 12.4–15.4)
PLATELET # BLD: 298 K/UL (ref 135–450)
PMV BLD AUTO: 8.7 FL (ref 5–10.5)
RBC # BLD: 4.85 M/UL (ref 4.2–5.9)
SEDIMENTATION RATE, ERYTHROCYTE: 25 MM/HR (ref 0–20)
WBC # BLD: 9.1 K/UL (ref 4–11)

## 2019-12-18 PROCEDURE — 86140 C-REACTIVE PROTEIN: CPT

## 2019-12-18 PROCEDURE — 36415 COLL VENOUS BLD VENIPUNCTURE: CPT

## 2019-12-18 PROCEDURE — 85652 RBC SED RATE AUTOMATED: CPT

## 2019-12-18 PROCEDURE — 85025 COMPLETE CBC W/AUTO DIFF WBC: CPT

## 2020-09-02 ENCOUNTER — APPOINTMENT (OUTPATIENT)
Dept: GENERAL RADIOLOGY | Age: 55
DRG: 854 | End: 2020-09-02
Payer: COMMERCIAL

## 2020-09-02 ENCOUNTER — HOSPITAL ENCOUNTER (INPATIENT)
Age: 55
LOS: 7 days | Discharge: HOME | DRG: 854 | End: 2020-09-09
Attending: EMERGENCY MEDICINE | Admitting: INTERNAL MEDICINE
Payer: COMMERCIAL

## 2020-09-02 LAB
A/G RATIO: 1.1 (ref 1.1–2.2)
ALBUMIN SERPL-MCNC: 4 G/DL (ref 3.4–5)
ALP BLD-CCNC: 112 U/L (ref 40–129)
ALT SERPL-CCNC: 18 U/L (ref 10–40)
ANION GAP SERPL CALCULATED.3IONS-SCNC: 15 MMOL/L (ref 3–16)
AST SERPL-CCNC: 24 U/L (ref 15–37)
BASOPHILS ABSOLUTE: 0 K/UL (ref 0–0.2)
BASOPHILS RELATIVE PERCENT: 0.4 %
BILIRUB SERPL-MCNC: 1.1 MG/DL (ref 0–1)
BUN BLDV-MCNC: 16 MG/DL (ref 7–20)
CALCIUM SERPL-MCNC: 9 MG/DL (ref 8.3–10.6)
CHLORIDE BLD-SCNC: 97 MMOL/L (ref 99–110)
CO2: 22 MMOL/L (ref 21–32)
CREAT SERPL-MCNC: 1.2 MG/DL (ref 0.9–1.3)
EKG ATRIAL RATE: 109 BPM
EKG DIAGNOSIS: NORMAL
EKG P AXIS: 55 DEGREES
EKG P-R INTERVAL: 144 MS
EKG Q-T INTERVAL: 296 MS
EKG QRS DURATION: 78 MS
EKG QTC CALCULATION (BAZETT): 398 MS
EKG R AXIS: 21 DEGREES
EKG T AXIS: 58 DEGREES
EKG VENTRICULAR RATE: 109 BPM
EOSINOPHILS ABSOLUTE: 0 K/UL (ref 0–0.6)
EOSINOPHILS RELATIVE PERCENT: 0 %
GFR AFRICAN AMERICAN: >60
GFR NON-AFRICAN AMERICAN: >60
GLOBULIN: 3.7 G/DL
GLUCOSE BLD-MCNC: 215 MG/DL (ref 70–99)
GLUCOSE BLD-MCNC: 254 MG/DL (ref 70–99)
GLUCOSE BLD-MCNC: 267 MG/DL (ref 70–99)
HCT VFR BLD CALC: 42.4 % (ref 40.5–52.5)
HEMOGLOBIN: 14.1 G/DL (ref 13.5–17.5)
LACTIC ACID, SEPSIS: 1.5 MMOL/L (ref 0.4–1.9)
LACTIC ACID, SEPSIS: 1.5 MMOL/L (ref 0.4–1.9)
LACTIC ACID, SEPSIS: 1.9 MMOL/L (ref 0.4–1.9)
LYMPHOCYTES ABSOLUTE: 0.7 K/UL (ref 1–5.1)
LYMPHOCYTES RELATIVE PERCENT: 6.6 %
MCH RBC QN AUTO: 29.5 PG (ref 26–34)
MCHC RBC AUTO-ENTMCNC: 33.3 G/DL (ref 31–36)
MCV RBC AUTO: 88.5 FL (ref 80–100)
MONOCYTES ABSOLUTE: 0.4 K/UL (ref 0–1.3)
MONOCYTES RELATIVE PERCENT: 3.3 %
NEUTROPHILS ABSOLUTE: 9.9 K/UL (ref 1.7–7.7)
NEUTROPHILS RELATIVE PERCENT: 89.7 %
PDW BLD-RTO: 13.6 % (ref 12.4–15.4)
PERFORMED ON: ABNORMAL
PERFORMED ON: ABNORMAL
PLATELET # BLD: 252 K/UL (ref 135–450)
PMV BLD AUTO: 7.6 FL (ref 5–10.5)
POTASSIUM SERPL-SCNC: 4.7 MMOL/L (ref 3.5–5.1)
RBC # BLD: 4.79 M/UL (ref 4.2–5.9)
SEDIMENTATION RATE, ERYTHROCYTE: 50 MM/HR (ref 0–20)
SODIUM BLD-SCNC: 134 MMOL/L (ref 136–145)
TOTAL PROTEIN: 7.7 G/DL (ref 6.4–8.2)
TROPONIN: <0.01 NG/ML
WBC # BLD: 11 K/UL (ref 4–11)

## 2020-09-02 PROCEDURE — 84484 ASSAY OF TROPONIN QUANT: CPT

## 2020-09-02 PROCEDURE — 83605 ASSAY OF LACTIC ACID: CPT

## 2020-09-02 PROCEDURE — 2580000003 HC RX 258: Performed by: INTERNAL MEDICINE

## 2020-09-02 PROCEDURE — 73630 X-RAY EXAM OF FOOT: CPT

## 2020-09-02 PROCEDURE — 96375 TX/PRO/DX INJ NEW DRUG ADDON: CPT

## 2020-09-02 PROCEDURE — 99291 CRITICAL CARE FIRST HOUR: CPT

## 2020-09-02 PROCEDURE — 93005 ELECTROCARDIOGRAM TRACING: CPT | Performed by: NURSE PRACTITIONER

## 2020-09-02 PROCEDURE — 6370000000 HC RX 637 (ALT 250 FOR IP): Performed by: INTERNAL MEDICINE

## 2020-09-02 PROCEDURE — 83036 HEMOGLOBIN GLYCOSYLATED A1C: CPT

## 2020-09-02 PROCEDURE — 6360000002 HC RX W HCPCS: Performed by: NURSE PRACTITIONER

## 2020-09-02 PROCEDURE — 85652 RBC SED RATE AUTOMATED: CPT

## 2020-09-02 PROCEDURE — 96374 THER/PROPH/DIAG INJ IV PUSH: CPT

## 2020-09-02 PROCEDURE — 6370000000 HC RX 637 (ALT 250 FOR IP): Performed by: EMERGENCY MEDICINE

## 2020-09-02 PROCEDURE — 6360000002 HC RX W HCPCS: Performed by: INTERNAL MEDICINE

## 2020-09-02 PROCEDURE — 85025 COMPLETE CBC W/AUTO DIFF WBC: CPT

## 2020-09-02 PROCEDURE — 36415 COLL VENOUS BLD VENIPUNCTURE: CPT

## 2020-09-02 PROCEDURE — 2580000003 HC RX 258: Performed by: NURSE PRACTITIONER

## 2020-09-02 PROCEDURE — 87186 SC STD MICRODIL/AGAR DIL: CPT

## 2020-09-02 PROCEDURE — 71045 X-RAY EXAM CHEST 1 VIEW: CPT

## 2020-09-02 PROCEDURE — 1200000000 HC SEMI PRIVATE

## 2020-09-02 PROCEDURE — 80053 COMPREHEN METABOLIC PANEL: CPT

## 2020-09-02 PROCEDURE — 87150 DNA/RNA AMPLIFIED PROBE: CPT

## 2020-09-02 PROCEDURE — 87040 BLOOD CULTURE FOR BACTERIA: CPT

## 2020-09-02 PROCEDURE — 87077 CULTURE AEROBIC IDENTIFY: CPT

## 2020-09-02 RX ORDER — ACETAMINOPHEN 325 MG/1
650 TABLET ORAL EVERY 6 HOURS PRN
Status: DISCONTINUED | OUTPATIENT
Start: 2020-09-02 | End: 2020-09-09 | Stop reason: HOSPADM

## 2020-09-02 RX ORDER — SODIUM CHLORIDE 9 MG/ML
INJECTION, SOLUTION INTRAVENOUS CONTINUOUS
Status: DISPENSED | OUTPATIENT
Start: 2020-09-02 | End: 2020-09-03

## 2020-09-02 RX ORDER — SODIUM CHLORIDE 0.9 % (FLUSH) 0.9 %
10 SYRINGE (ML) INJECTION PRN
Status: DISCONTINUED | OUTPATIENT
Start: 2020-09-02 | End: 2020-09-09 | Stop reason: HOSPADM

## 2020-09-02 RX ORDER — ONDANSETRON 2 MG/ML
4 INJECTION INTRAMUSCULAR; INTRAVENOUS EVERY 6 HOURS PRN
Status: DISCONTINUED | OUTPATIENT
Start: 2020-09-02 | End: 2020-09-09 | Stop reason: HOSPADM

## 2020-09-02 RX ORDER — SODIUM CHLORIDE 0.9 % (FLUSH) 0.9 %
10 SYRINGE (ML) INJECTION EVERY 12 HOURS SCHEDULED
Status: DISCONTINUED | OUTPATIENT
Start: 2020-09-02 | End: 2020-09-09 | Stop reason: HOSPADM

## 2020-09-02 RX ORDER — ACETAMINOPHEN 500 MG
1000 TABLET ORAL ONCE
Status: COMPLETED | OUTPATIENT
Start: 2020-09-02 | End: 2020-09-02

## 2020-09-02 RX ORDER — ACETAMINOPHEN 650 MG/1
650 SUPPOSITORY RECTAL EVERY 6 HOURS PRN
Status: DISCONTINUED | OUTPATIENT
Start: 2020-09-02 | End: 2020-09-09 | Stop reason: HOSPADM

## 2020-09-02 RX ORDER — SODIUM CHLORIDE, SODIUM LACTATE, POTASSIUM CHLORIDE, AND CALCIUM CHLORIDE .6; .31; .03; .02 G/100ML; G/100ML; G/100ML; G/100ML
30 INJECTION, SOLUTION INTRAVENOUS ONCE
Status: COMPLETED | OUTPATIENT
Start: 2020-09-02 | End: 2020-09-02

## 2020-09-02 RX ORDER — SODIUM CHLORIDE, SODIUM LACTATE, POTASSIUM CHLORIDE, CALCIUM CHLORIDE 600; 310; 30; 20 MG/100ML; MG/100ML; MG/100ML; MG/100ML
INJECTION, SOLUTION INTRAVENOUS
Status: DISPENSED
Start: 2020-09-02 | End: 2020-09-03

## 2020-09-02 RX ORDER — GLIPIZIDE 5 MG/1
15 TABLET ORAL
Status: DISCONTINUED | OUTPATIENT
Start: 2020-09-03 | End: 2020-09-07

## 2020-09-02 RX ORDER — POLYETHYLENE GLYCOL 3350 17 G/17G
17 POWDER, FOR SOLUTION ORAL DAILY PRN
Status: DISCONTINUED | OUTPATIENT
Start: 2020-09-02 | End: 2020-09-09 | Stop reason: HOSPADM

## 2020-09-02 RX ORDER — PROMETHAZINE HYDROCHLORIDE 25 MG/1
12.5 TABLET ORAL EVERY 6 HOURS PRN
Status: DISCONTINUED | OUTPATIENT
Start: 2020-09-02 | End: 2020-09-09 | Stop reason: HOSPADM

## 2020-09-02 RX ADMIN — Medication 10 ML: at 21:01

## 2020-09-02 RX ADMIN — SODIUM CHLORIDE, POTASSIUM CHLORIDE, SODIUM LACTATE AND CALCIUM CHLORIDE 1000 ML: 600; 310; 30; 20 INJECTION, SOLUTION INTRAVENOUS at 15:55

## 2020-09-02 RX ADMIN — INSULIN LISPRO 3 UNITS: 100 INJECTION, SOLUTION INTRAVENOUS; SUBCUTANEOUS at 21:01

## 2020-09-02 RX ADMIN — PIPERACILLIN AND TAZOBACTAM 3.38 G: 3; .375 INJECTION, POWDER, LYOPHILIZED, FOR SOLUTION INTRAVENOUS at 23:55

## 2020-09-02 RX ADMIN — ENOXAPARIN SODIUM 40 MG: 40 INJECTION SUBCUTANEOUS at 18:30

## 2020-09-02 RX ADMIN — SODIUM CHLORIDE: 9 INJECTION, SOLUTION INTRAVENOUS at 21:01

## 2020-09-02 RX ADMIN — INSULIN LISPRO 6 UNITS: 100 INJECTION, SOLUTION INTRAVENOUS; SUBCUTANEOUS at 18:45

## 2020-09-02 RX ADMIN — PIPERACILLIN AND TAZOBACTAM 3.38 G: 3; .375 INJECTION, POWDER, LYOPHILIZED, FOR SOLUTION INTRAVENOUS at 15:57

## 2020-09-02 RX ADMIN — ACETAMINOPHEN 1000 MG: 500 TABLET ORAL at 16:02

## 2020-09-02 RX ADMIN — VANCOMYCIN HYDROCHLORIDE 1000 MG: 1 INJECTION, POWDER, LYOPHILIZED, FOR SOLUTION INTRAVENOUS at 15:55

## 2020-09-02 ASSESSMENT — PAIN DESCRIPTION - ORIENTATION
ORIENTATION: LEFT
ORIENTATION: LEFT

## 2020-09-02 ASSESSMENT — PAIN SCALES - GENERAL
PAINLEVEL_OUTOF10: 7
PAINLEVEL_OUTOF10: 0
PAINLEVEL_OUTOF10: 5

## 2020-09-02 ASSESSMENT — PAIN DESCRIPTION - PAIN TYPE: TYPE: ACUTE PAIN

## 2020-09-02 ASSESSMENT — PAIN DESCRIPTION - LOCATION
LOCATION: FOOT
LOCATION: FOOT

## 2020-09-02 NOTE — H&P
Hospital Medicine History & Physical      PCP: Veronica Penaloza MD, MD    Date of Admission: 9/2/2020    Date of Service: Pt seen/examined on 9/2/20 and Admitted to Inpatient with expected LOS greater than two midnights due to medical therapy. Chief Complaint:  I had fever/emesis      History Of Present Illness:    54 y.o. male with DM2, chronic foot ulcer who presented to [facility] with fever/emesis. Pt has hx of prior right foot ulcer(required rt 5th toe amputation and PICC line/iv abx) and developed left lateral foot ulcer in the past several months. He has been seeing podiatry for the past 12 weeks and was seeing wound care(at [facility]) and was placed in a cast.  Initially size was 1x1cm and now has grown to 4x4cm, per pt. He noted ongoing chills/fever of 101-102F, fatigue of late but no diarrhea. He took tylenol at home. He saw podiatry today(has been seeing weekly every Wed) and was sent in for further care. Past Medical History:          Diagnosis Date    [facility]     Foot ulcer ([facility]     Right foot/diabetic       Past Surgical History:          Procedure Laterality Date    ANKLE SURGERY      FOOT DEBRIDEMENT Right 7/11/2019    RIGHT FOOT  INCISION AND DRAINAGE WITH 5TH METATARSAL BONE RESECTION performed by Sandy Bob DPM at [address] [place] [address]      right ankle    TOE AMPUTATION Right 7/8/2019    incision and drainage right foot with possible partial 5th ray amputation performed by Sandy Bob DPM at [facility]       Medications Prior to Admission:      Prior to Admission medications    Medication Sig Start Date End Date Taking?  Authorizing Provider   finasteride (PROSCAR) 5 MG tablet Take 1 tablet by mouth daily  Patient not taking: Reported on 9/3/2019 7/16/19   Scott Paz MD   tamsulosin (FLOMAX) 0.4 MG capsule Take 1 capsule by mouth daily  Patient not taking: Reported on 9/3/2019 7/16/19   Scott Paz MD   glipiZIDE (GLUCOTROL) 5 MG tablet Take 15 mg by mouth daily as needed 4/30/19   Historical Provider, MD   cyclobenzaprine (FLEXERIL) 10 MG tablet Take 10 mg by mouth 3 times daily as needed    Historical Provider, MD       Allergies:  Metformin and related    Social History:      The patient currently lives at home    TOBACCO:   reports that he has never smoked. He has never used smokeless tobacco.  ETOH:   reports no history of alcohol use. E-Cigarettes Vaping or Juuling     Questions Responses    Vaping Use     Start Date     Does device contain nicotine? Quit Date     Vaping Type             Family History:       Reviewed in detail and negative for DM, CAD, Cancer, CVA. Positive as follows:    History reviewed. No pertinent family history. REVIEW OF SYSTEMS:   Pertinent positives as noted in the HPI. All other systems reviewed and negative. PHYSICAL EXAM PERFORMED:    /65   Pulse 77   Temp 99.7 °F (37.6 °C) (Oral)   Resp 16   Ht 5' 9\" (1.753 m)   Wt 233 lb 1 oz (105.7 kg)   SpO2 95%   BMI 34.42 kg/m²     General appearance:  No apparent distress, appears stated age and cooperative. HEENT:  Normal cephalic, atraumatic without obvious deformity. Pupils equal, round, and reactive to light. Extra ocular muscles intact. Conjunctivae/corneas clear. Neck: Supple, with full range of motion. No jugular venous distention. Trachea midline. Respiratory:  Normal respiratory effort. Clear to auscultation, bilaterally without Rales/Wheezes/Rhonchi. Cardiovascular:  Regular rate and rhythm with normal S1/S2 without murmurs, rubs or gallops. Abdomen: Soft, non-tender, non-distended with normal bowel sounds. Musculoskeletal:  No clubbing, cyanosis or edema bilaterally. Full range of motion without deformity. Left lateral foot ulceration noted on plantar aspect, prior right 5th toe amputation noted  Skin: Skin color, texture, turgor normal.  No rashes or lesions.   Neurologic:  Neurovascularly intact without any focal sensory/motor AM    DM2- unsure if controlled or not  -a1c pending  Ac/hs bs  Med SSI  -on glipzide at home, continued    BPH- per emr, was on flomax and proscar but not taking(restart if needed)      DVT Prophylaxis: lovenox  Diet:Carb control  Code Status: FULL    PT/OT Eval Status: not ordered    Dispo - pending workup       Farshad Zendejas MD    Thank you Asya Rice MD, MD for the opportunity to be involved in this patient's care. If you have any questions or concerns please feel free to contact me at [phone].

## 2020-09-02 NOTE — ED PROVIDER NOTES
Emergency Department Attending Provider Note  Location: [address] [place] [address]  ED  9/2/2020     Patient Identification  Herman Henry is a 54 y.o. male      Herman Henry was evaluated in the Emergency Department for worsening left foot/toe wound. Diabetic currently being followed by podiatry. Has worsening acute on chronic wound that appears infected noted by podiatrist today was referred for further management. Reports increased pain today and nausea with vomiting fever and chills. .  Has history of sepsis secondary to diabetic foot ulcers. Physical exam revealed:  Vital signs reviewed  Gen: Alert and oriented, no acute distress  Card: Tachycardic regular rhythm, no murmurs, equal radial pulses  Resp: CBSBL, no wheezes rales or rhonchi  Abd: Soft nontender, nondistended abdomen, no guarding or rebound, no CVA tenderness  Ext: No deformities noted, left lateral foot and fifth toe has ulcerative lesion on the plantar aspect. There is mild erythema proximally on the dorsum. No significant tenderness no crepitus. Palpable pedal pulses. Neuro: Grossly normal moving extremities equally      EKG Interpretation  Sinus tachycardia rate 110, normal axis normal intervals no evidence of conduction abnormalities or diagnostic ischemic changes. Patient seen and evaluated, relevant records reviewed. 59-year-old male with poorly controlled diabetes and chronic foot ulcers who is been septic and required toe amputations in the past from diabetic foot wounds who presents with worsening wound of his left foot. Appears to be infected on exam.  He is tachycardic. Will obtain imaging, lab work however suspect possibly developing sepsis. Will benefit from admission. Although initial history and physical exam information was obtained by KILO/NPP (who also dictated a record of this visit), I independently examined and evaluated this patient and made all diagnostic, treatment, and disposition decisions.     This chart was generated in part by using Dragon Dictation system and may contain errors related to that system including errors in grammar, punctuation, and spelling, as well as words and phrases that may be inappropriate. If there are any questions or concerns please feel free to contact the dictating provider for clarification.      MD Jerald Jeffers MD  09/02/20 9935

## 2020-09-02 NOTE — PROGRESS NOTES
Pt is diabetic followed by podiatry for foot ulcer on left foot. Per podiatry phone call to staff pt is a non-compliant pt with care and follow up. Pt present with fever and shakes. Pt awake alert orient x4. resp easy some labored breathing however pt temp 103+ will cont to monitor.

## 2020-09-02 NOTE — CONSULTS
Pharmacy to Dose Vancomycin    Dx: wound infection   Goal trough = > 10 mcg/mL   Pt wt = 105kg  Estimated Creatinine Clearance: 83 mL/min (based on SCr of 1.2 mg/dL). Vancomycin 1,000 mg IVPB x 1 in ED at 1600 today.   Start Vancomycin 1,000mg IVPB q12h tomorrow at 4am  Vancomycin trough prior to 4th dose on 9/4 ~3am.  Lexus Baker/Debra. 9/2/20 6:26 PM EDT

## 2020-09-02 NOTE — ED PROVIDER NOTES
EMERGENCY DEPARTMENT ENCOUNTER      This patient was seen and evaluated by the attending physician. Pt Name: Ignacio Katz  MRN: [id number]  [place] 1965  Date of evaluation: 9/2/2020  Provider: KAVYA BlackburnC  PCP: Demetrio Trinidad MD, MD  ED Attending: Dr. Melody Kee    History provided by the patient. CHIEF COMPLAINT:     Chief Complaint   Patient presents with    Wound Infection     left foot wound x 9 weeks, sent in by pcp for fever, fever started on sunday       HISTORY OF PRESENT ILLNESS:      Ignacio Katz is a 54 y.o. male who presents to [facility] ER with complaints of a fever, possible infection of a diabetic ulcer on his left foot. Patient states that he is been seeing podiatry for an ulceration along the plantar surface of his left foot. Patient states that he was in the podiatrist office today and was noted to be febrile, tachycardic and he was having Rigors. Patient presented here to the ED and is febrile to 103 and tachycardic to 120. Patient states that the podiatrist said that his wound on his foot has been getting worse. Has had some nausea but no vomiting. Is here for further evaluation. Location:left  foot  Quality:ache  Severity:7  Duration:9 weeks  Modifying factors:none noted    Nursing Notes were reviewed     REVIEW OF SYSTEMS:     Review of Systems  All systems, atotal of 10, are reviewed and negative except for those that were just noted in history present illness.         PAST MEDICAL HISTORY:     Past Medical History:   Diagnosis Date    Diabetes mellitus ([address]     Foot ulcer ([address]     Right foot/diabetic         SURGICAL HISTORY:      Past Surgical History:   Procedure Laterality Date    ANKLE SURGERY      FOOT DEBRIDEMENT Right 7/11/2019    RIGHT FOOT  INCISION AND DRAINAGE WITH 5TH METATARSAL BONE RESECTION performed by Velma Bonilla DPM at [address] [facility]      right ankle    TOE AMPUTATION Right 7/8/2019    incision and ED Triage Vitals   BP Temp Temp Source Pulse Resp SpO2 Height Weight   09/02/20 1509 09/02/20 1509 09/02/20 1509 09/02/20 1509 09/02/20 1509 09/02/20 1509 09/02/20 1725 09/02/20 1509   (!) 149/75 103.2 °F (39.6 °C) Oral 116 20 96 % 5' 9\" (1.753 m) 223 lb (101.2 kg)       Physical Exam    CONSTITUTIONAL: Awake and alert. Cooperative. Well-developed. Well-nourished. Vitals:    09/02/20 1650 09/02/20 1655 09/02/20 1700 09/02/20 1725   BP:    (!) 164/73   Pulse: 109 109 110    Resp: (!) 39 (!) 36 (!) 42 18   Temp:       TempSrc:    Oral   SpO2: 93% 92% 92% 94%   Weight:    233 lb 1 oz (105.7 kg)   Height:    5' 9\" (1.753 m)     HENT: Normocephalic. Atraumatic. External ears normal, without discharge. TMs clear bilaterally. No nasal discharge. Oropharynx clear, no erythema. Mucous membranes moist.  EYES: Conjunctiva non-injected, no lid abnormalities noted. No scleral icterus. PERRL. EOM's grossly intact. Anterior chambers clear. NECK: Supple. Normal ROM. No meningismus. No thyroid tenderness or swelling noted. CARDIOVASCULAR: Tachycardic. No Murmer. PULMONARY/CHEST WALL: Effort normal. No tachypnea. Lungs clear to ausculation. ABDOMEN: Normal BS. Soft. Nondistended. No tenderness to palpate. No guarding. No hernias noted. No splenomegaly. Back: Spine is midline. No ecchymosis. No crepitus on palpation. No obvious subluxation of vertebral column. No saddle anesthesia or evidence of cauda equina. /ANORECTAL: Not assessed  MUSKULOSKELETAL: Normal ROM. No acute deformities. No edema. SKIN: Warm and dry. There is an oozing wound noted along the plantar surface of the left foot at the base of the toes. Wound is situated mostly along the lateral aspect. There is some overlying erythema along the dorsum of the left foot as well. NEUROLOGICAL:  GCS 15. CN II-XII grossly intact. Strength is 5/5 in allextremities and sensation is intact. PSYCHIATRIC: Normal affect, normal insight and judgement.  Alert andoriented x 3.         DIAGNOSTIC RESULTS:     LABS:    Results for orders placed or performed during the hospital encounter of 09/02/20   CBC auto differential   Result Value Ref Range    WBC 11.0 4.0 - 11.0 K/uL    RBC 4.79 4.20 - 5.90 M/uL    Hemoglobin 14.1 13.5 - 17.5 g/dL    Hematocrit 42.4 40.5 - 52.5 %    MCV 88.5 80.0 - 100.0 fL    MCH 29.5 26.0 - 34.0 pg    MCHC 33.3 31.0 - 36.0 g/dL    RDW 13.6 12.4 - 15.4 %    Platelets 529 309 - 818 K/uL    MPV 7.6 5.0 - 10.5 fL    Neutrophils % 89.7 %    Lymphocytes % 6.6 %    Monocytes % 3.3 %    Eosinophils % 0.0 %    Basophils % 0.4 %    Neutrophils Absolute 9.9 (H) 1.7 - 7.7 K/uL    Lymphocytes Absolute 0.7 (L) 1.0 - 5.1 K/uL    Monocytes Absolute 0.4 0.0 - 1.3 K/uL    Eosinophils Absolute 0.0 0.0 - 0.6 K/uL    Basophils Absolute 0.0 0.0 - 0.2 K/uL   Comprehensive metabolic panel   Result Value Ref Range    Sodium 134 (L) 136 - 145 mmol/L    Potassium 4.7 3.5 - 5.1 mmol/L    Chloride 97 (L) 99 - 110 mmol/L    CO2 22 21 - 32 mmol/L    Anion Gap 15 3 - 16    Glucose 215 (H) 70 - 99 mg/dL    BUN 16 7 - 20 mg/dL    CREATININE 1.2 0.9 - 1.3 mg/dL    GFR Non-African American >60 >60    GFR African American >60 >60    Calcium 9.0 8.3 - 10.6 mg/dL    Total Protein 7.7 6.4 - 8.2 g/dL    Alb 4.0 3.4 - 5.0 g/dL    Albumin/Globulin Ratio 1.1 1.1 - 2.2    Total Bilirubin 1.1 (H) 0.0 - 1.0 mg/dL    Alkaline Phosphatase 112 40 - 129 U/L    ALT 18 10 - 40 U/L    AST 24 15 - 37 U/L    Globulin 3.7 g/dL   Lactate, Sepsis   Result Value Ref Range    Lactic Acid, Sepsis 1.5 0.4 - 1.9 mmol/L   Troponin   Result Value Ref Range    Troponin <0.01 <0.01 ng/mL   Sedimentation Rate   Result Value Ref Range    Sed Rate 50 (H) 0 - 20 mm/Hr   EKG 12 Lead   Result Value Ref Range    Ventricular Rate 109 BPM    Atrial Rate 109 BPM    P-R Interval 144 ms    QRS Duration 78 ms    Q-T Interval 296 ms    QTc Calculation (Bazett) 398 ms    P Axis 55 degrees    R Axis 21 degrees    T Axis 58 degrees X-ray did suggest osteomyelitis present. Patient laboratory studies actually look good, no leukocytosis, lactate negative. Patient was started on IV antibiotics, given fluid bolus. I discussed the case with the hospitalist who agrees with plan for admission. Patient agreement as well. Blood cultures were obtained. Patient was assessed by the attending physician who agrees with plan for admission to the hospital.  Patient was admitted to the hospital in stable condition. Patient laboratory studies, radiographic imaging, andassessment were all discussed with the patient and/or patient family. There was shared decision-making between myself, the attending physician, as well as the patient and/or their surrogate and we are all in agreement with admission. There was an opportunity for questions and all questions were answered to the best of my ability and to the satisfaction of the patient and/or patient family. FINAL IMPRESSION:      1. Osteomyelitis of left foot, unspecified type ([address]    2.  Septicemia [facility])          DISPOSITION/PLAN:   DISPOSITIONAdmitted      PATIENT REFERRED TO:  MD Yojana Oh [address] [facility] [address]  [phone]            DISCHARGE MEDICATIONS:  Current Discharge Medication List                     (Please note that portions of this note were completed with a voice recognition program.  Efforts were made to edit the dictations, but occasionally words are mis-transcribed.)    KAVYA Hernández CNP-C (electronically signed)        KAVYA Hernández CNP  09/02/20 [id number]

## 2020-09-03 ENCOUNTER — APPOINTMENT (OUTPATIENT)
Dept: MRI IMAGING | Age: 55
DRG: 854 | End: 2020-09-03
Payer: COMMERCIAL

## 2020-09-03 LAB
ANION GAP SERPL CALCULATED.3IONS-SCNC: 13 MMOL/L (ref 3–16)
BUN BLDV-MCNC: 15 MG/DL (ref 7–20)
CALCIUM SERPL-MCNC: 8.6 MG/DL (ref 8.3–10.6)
CHLORIDE BLD-SCNC: 98 MMOL/L (ref 99–110)
CO2: 22 MMOL/L (ref 21–32)
CREAT SERPL-MCNC: 0.9 MG/DL (ref 0.9–1.3)
ESTIMATED AVERAGE GLUCOSE: 217.3 MG/DL
GFR AFRICAN AMERICAN: >60
GFR NON-AFRICAN AMERICAN: >60
GLUCOSE BLD-MCNC: 176 MG/DL (ref 70–99)
GLUCOSE BLD-MCNC: 199 MG/DL (ref 70–99)
GLUCOSE BLD-MCNC: 208 MG/DL (ref 70–99)
GLUCOSE BLD-MCNC: 68 MG/DL (ref 70–99)
GLUCOSE BLD-MCNC: 71 MG/DL (ref 70–99)
GLUCOSE BLD-MCNC: 94 MG/DL (ref 70–99)
GLUCOSE BLD-MCNC: 94 MG/DL (ref 70–99)
HBA1C MFR BLD: 9.2 %
PERFORMED ON: ABNORMAL
PERFORMED ON: NORMAL
POTASSIUM REFLEX MAGNESIUM: 3.9 MMOL/L (ref 3.5–5.1)
REPORT: NORMAL
SARS-COV-2, NAAT: NOT DETECTED
SODIUM BLD-SCNC: 133 MMOL/L (ref 136–145)

## 2020-09-03 PROCEDURE — 86403 PARTICLE AGGLUT ANTBDY SCRN: CPT

## 2020-09-03 PROCEDURE — 87186 SC STD MICRODIL/AGAR DIL: CPT

## 2020-09-03 PROCEDURE — 94761 N-INVAS EAR/PLS OXIMETRY MLT: CPT

## 2020-09-03 PROCEDURE — 2580000003 HC RX 258: Performed by: INTERNAL MEDICINE

## 2020-09-03 PROCEDURE — 6360000002 HC RX W HCPCS: Performed by: INTERNAL MEDICINE

## 2020-09-03 PROCEDURE — A9579 GAD-BASE MR CONTRAST NOS,1ML: HCPCS | Performed by: INTERNAL MEDICINE

## 2020-09-03 PROCEDURE — 1200000000 HC SEMI PRIVATE

## 2020-09-03 PROCEDURE — 6360000004 HC RX CONTRAST MEDICATION: Performed by: INTERNAL MEDICINE

## 2020-09-03 PROCEDURE — 87077 CULTURE AEROBIC IDENTIFY: CPT

## 2020-09-03 PROCEDURE — 6370000000 HC RX 637 (ALT 250 FOR IP): Performed by: INTERNAL MEDICINE

## 2020-09-03 PROCEDURE — 73720 MRI LWR EXTREMITY W/O&W/DYE: CPT

## 2020-09-03 PROCEDURE — 80048 BASIC METABOLIC PNL TOTAL CA: CPT

## 2020-09-03 PROCEDURE — 2700000000 HC OXYGEN THERAPY PER DAY

## 2020-09-03 PROCEDURE — 87070 CULTURE OTHR SPECIMN AEROBIC: CPT

## 2020-09-03 PROCEDURE — 87205 SMEAR GRAM STAIN: CPT

## 2020-09-03 PROCEDURE — U0002 COVID-19 LAB TEST NON-CDC: HCPCS

## 2020-09-03 PROCEDURE — 99254 IP/OBS CNSLTJ NEW/EST MOD 60: CPT | Performed by: INTERNAL MEDICINE

## 2020-09-03 PROCEDURE — 36415 COLL VENOUS BLD VENIPUNCTURE: CPT

## 2020-09-03 RX ORDER — DEXTROSE MONOHYDRATE 25 G/50ML
12.5 INJECTION, SOLUTION INTRAVENOUS PRN
Status: DISCONTINUED | OUTPATIENT
Start: 2020-09-03 | End: 2020-09-09 | Stop reason: HOSPADM

## 2020-09-03 RX ORDER — DEXTROSE MONOHYDRATE 50 MG/ML
100 INJECTION, SOLUTION INTRAVENOUS PRN
Status: DISCONTINUED | OUTPATIENT
Start: 2020-09-03 | End: 2020-09-09 | Stop reason: HOSPADM

## 2020-09-03 RX ORDER — NICOTINE POLACRILEX 4 MG
15 LOZENGE BUCCAL PRN
Status: DISCONTINUED | OUTPATIENT
Start: 2020-09-03 | End: 2020-09-09 | Stop reason: HOSPADM

## 2020-09-03 RX ADMIN — INSULIN LISPRO 2 UNITS: 100 INJECTION, SOLUTION INTRAVENOUS; SUBCUTANEOUS at 12:43

## 2020-09-03 RX ADMIN — PIPERACILLIN AND TAZOBACTAM 3.38 G: 3; .375 INJECTION, POWDER, LYOPHILIZED, FOR SOLUTION INTRAVENOUS at 16:15

## 2020-09-03 RX ADMIN — PIPERACILLIN AND TAZOBACTAM 3.38 G: 3; .375 INJECTION, POWDER, LYOPHILIZED, FOR SOLUTION INTRAVENOUS at 08:32

## 2020-09-03 RX ADMIN — GLIPIZIDE 15 MG: 5 TABLET ORAL at 07:28

## 2020-09-03 RX ADMIN — INSULIN LISPRO 4 UNITS: 100 INJECTION, SOLUTION INTRAVENOUS; SUBCUTANEOUS at 08:36

## 2020-09-03 RX ADMIN — ENOXAPARIN SODIUM 40 MG: 40 INJECTION SUBCUTANEOUS at 08:32

## 2020-09-03 RX ADMIN — Medication 10 ML: at 08:32

## 2020-09-03 RX ADMIN — GADOTERIDOL 20 ML: 279.3 INJECTION, SOLUTION INTRAVENOUS at 15:15

## 2020-09-03 RX ADMIN — VANCOMYCIN HYDROCHLORIDE 1000 MG: 1 INJECTION, POWDER, LYOPHILIZED, FOR SOLUTION INTRAVENOUS at 03:55

## 2020-09-03 RX ADMIN — ACETAMINOPHEN 650 MG: 325 TABLET ORAL at 07:41

## 2020-09-03 ASSESSMENT — PAIN SCALES - GENERAL
PAINLEVEL_OUTOF10: 3
PAINLEVEL_OUTOF10: 0

## 2020-09-03 NOTE — PROGRESS NOTES
Comprehensive Nutrition Assessment    Type and Reason for Visit:  Initial, Positive Nutrition Screen(wound, diet education)    Nutrition Recommendations/Plan:   1. Continue carb control diet   2. Encourage PO intakes and high protein   3. Encourage diet compliance and healthy lifestyle  4. Monitor nutrition adequacy, pertinent labs, bowel habits, wt changes, and clinical progress    Nutrition Assessment:  Pt admitted with sepsis. PHM of DM and diabetic foot ulcer, follows with podiatry and wound care as OP. Pt nutritionally at risk AEB emesis x3 days PTA. Pt reports good appetite and PO intakes this admission. Denies weight loss. Declined ONS. No c/o nausea or emesis today. Encouraged high protein meals for wound healing. Discussed carb control diet. Pt endorses monitoring BG, consistently under 150 mg/dl at home. Encouraged carb counting to assist with healing of foot ulcer. Pt receptive to information. Will continue to monitor. Malnutrition Assessment:  Malnutrition Status: At risk for malnutrition (Comment)      Estimated Daily Nutrient Needs:  Energy (kcal):  4404-9040 kcal; Weight Used for Energy Requirements:  Ideal(72.7 kg)     Protein (g):   g; Weight Used for Protein Requirements:  Ideal(1.2-1.5 g/kg)        Fluid (ml/day):  1 ml/kcal; Weight Used for Fluid Requirements:  [place]      Nutrition Related Findings:  trace BLE edema, no BM this admission      Wounds:  Diabetic Ulcer       Current Nutrition Therapies:    DIET CARB CONTROL; Anthropometric Measures:  · Height: 5' 9\" (175.3 cm)  · Current Body Weight: 233 lb (105.7 kg)   · Usual Body Weight: (stated weight hx)     · Ideal Body Weight: 160 lbs; % Ideal Body Weight 145.6 %   · BMI: 34.4  · BMI Categories: Obese Class 1 (BMI 30.0-34. 9)       Nutrition Diagnosis:   · Increased nutrient needs related to increase demand for energy/nutrients as evidenced by wounds, nausea, vomiting      Nutrition Interventions:   Food and/or Nutrient Delivery:  Continue Current Diet  Nutrition Education/Counseling:  Education completed   Coordination of Nutrition Care:  Continued Inpatient Monitoring    Goals:  Pt will consume greater than 50% of meals this admission       Nutrition Monitoring and Evaluation:   Behavioral-Environmental Outcomes:  Readiness for Change   Food/Nutrient Intake Outcomes:  Food and Nutrient Intake  Physical Signs/Symptoms Outcomes:  Biochemical Data, Nausea or Vomiting, Weight     Discharge Planning:    Continue current diet     Electronically signed by Marco A Rojas MS, RD, LD on 9/3/20 at 10:31 AM EDT    Contact: Office: [phone]

## 2020-09-03 NOTE — PROGRESS NOTES
Patient oxygen saturation is 95% on 2L/NC at this time. Decreased oxygen to 1L/NC at this time. Patient tolerating well.

## 2020-09-03 NOTE — CARE COORDINATION
CASE MANAGEMENT INITIAL ASSESSMENT      Reviewed chart and completed assessment via telephone with: pt via room phone   Explained Case Management role/services. Primary contact information: gabby Fox [phone]    Admit date/status: 9/2/20  Diagnosis: Sepsis   Is this a Readmission?: No  If so, please indicate possible factors that led to readmission. Insurance: BCBS   Precert required for SNF - Y       3 night stay required - N    Living arrangements, Adls, care needs, prior to admission: Pt lives in apartment alone with 15 steps to enter. There is no elevator at [facility]. He states that he is independent in all ADLs, drives, and still works. His daughter and ex-wife are available to help him should he need anything. Transportation: private via [employer]     [address] [employer] at home: Walker_x_Cane__RTS__ BSC__Shower Chair__  02__ HHN__ CPAP__  BiPap__  Hospital Bed__ W/C___ Other_____knee cart _____    Services in the home and/or outpatient, prior to admission: Follows with Podiatry every Wednesday and wound care weekly at [facility] - although pt states that he hasn't been for \"a while\". PT/OT recs: Not yet ordered     Hospital Exemption Notification (HEN): Needed for SNF     Barriers to discharge: None     Plan/comments:  ID and podiatry consults pending that this time - xray suggestive of osteomyelitis per MD note. Pt has had [facility] in the past for home IV Abx administration and is agreeable to using them again if indicated. He plans on returning home and feels that he is able to manage meds/ wound. CM will continue to follow for speciality recs and assist with needs as able.      ECOC on chart for MD signature      Angelia Escobar, RN

## 2020-09-03 NOTE — PLAN OF CARE
Problem: Nutrition  Goal: Optimal nutrition therapy  Outcome: Ongoing  Note: Nutrition Problem #1: Increased nutrient needs  Intervention: Food and/or Nutrient Delivery: Continue Current Diet  Nutritional Goals: Pt will consume greater than 50% of meals this admission

## 2020-09-03 NOTE — CONSULTS
Department of Podiatric Surgery  Dr. Pedro Watson Consult Note        Reason for Consult:  Left foot diabetic foot infection   Requesting Physician: Lachelle Ford MD     CHIEF COMPLAINT: left foot infection     History Obtained From:  patient    HISTORY OF PRESENT ILLNESS:                The patient is a 54 y.o. male who presented to our office to see Dr. Keila Polo with complaint of ulcer with infection left foot. Patient has had ulcer on and off for 6-7 months and has been to [facility] wound care [place] which he states wound was healed but he returned back to work and is on his feet and broke down after only 2 days. Patient during appointment with Dr. Che Castro began vomiting and also states he has not been feeling well with weakness and possible fever over the last week. Patient was then sent to [address] [place] [address] ER for admission. Patient has PHM of diabetes, uncontrolled.      Past Medical History:        Diagnosis Date    Diabetes mellitus ([facility] [address]     Foot ulcer ([facility] [address]     Right foot/diabetic     Past Surgical History:        Procedure Laterality Date    ANKLE SURGERY      FOOT DEBRIDEMENT Right 7/11/2019    RIGHT FOOT  INCISION AND DRAINAGE WITH 5TH METATARSAL BONE RESECTION performed by Raffi Caruso DPM at [address] [place] [address]      right ankle    TOE AMPUTATION Right 7/8/2019    incision and drainage right foot with possible partial 5th ray amputation performed by Raffi Caruso DPM at [facility]     Current Medications:   Current Facility-Administered Medications: glucose (GLUTOSE) 40 % oral gel 15 g, 15 g, Oral, PRN  dextrose 50 % IV solution, 12.5 g, Intravenous, PRN  glucagon (rDNA) injection 1 mg, 1 mg, Intramuscular, PRN  dextrose 5 % solution, 100 mL/hr, Intravenous, PRN  glipiZIDE (GLUCOTROL) tablet 15 mg, 15 mg, Oral, QAM AC  piperacillin-tazobactam (ZOSYN) 3.375 g in dextrose 5 % 50 mL IVPB extended infusion (mini-bag), 3.375 g, Intravenous, Q8H  sodium chloride flush 0.9 % injection 10 mL, 10 mL, Intravenous, 2 times per day  sodium chloride flush 0.9 % injection 10 mL, 10 mL, Intravenous, PRN  acetaminophen (TYLENOL) tablet 650 mg, 650 mg, Oral, Q6H PRN **OR** acetaminophen (TYLENOL) suppository 650 mg, 650 mg, Rectal, Q6H PRN  polyethylene glycol (GLYCOLAX) packet 17 g, 17 g, Oral, Daily PRN  promethazine (PHENERGAN) tablet 12.5 mg, 12.5 mg, Oral, Q6H PRN **OR** ondansetron (ZOFRAN) injection 4 mg, 4 mg, Intravenous, Q6H PRN  enoxaparin (LOVENOX) injection 40 mg, 40 mg, Subcutaneous, Daily  insulin lispro (HUMALOG) injection vial 0-12 Units, 0-12 Units, Subcutaneous, TID WC  insulin lispro (HUMALOG) injection vial 0-6 Units, 0-6 Units, Subcutaneous, Nightly  Allergies:  Metformin and related    Social History:   TOBACCO:   reports that he has never smoked. He has never used smokeless tobacco.  ETOH:   reports no history of alcohol use. DRUGS:   reports no history of drug use. Family History:   History reviewed. No pertinent family history. REVIEW OF SYSTEMS:    CONSTITUTIONAL:  positive for  fevers, fatigue and malaise. Denies shortness of breath or chest pain. PHYSICAL EXAM:    VITALS:  BP (!) 145/75   Pulse 73   Temp 98.6 °F (37 °C) (Oral)   Resp 18   Ht 5' 9\" (1.753 m)   Wt 233 lb 1 oz (105.7 kg)   SpO2 98%   BMI 34.42 kg/m²   Gen AAO x 3, pleasant male   Lower extremity evaluation: Dorsalis pedis and posterior tibial pulses are 2/4. Neuro: Decreased sensation noted to bilateral feet. No clonus is noted  Derm: Blanchable erythema edema and calor is noted to left foot plantar lateral and dorsal foot . Full-thickness to SQ tissue ulceration is noted plantar 5th met head , lateral  5th met head with purulent drainage. Ulcer measures 0.5 x 0.5x 0.5cm sub 5th met and lateral 5th met ulcer measures 1x 1 x 0.5cm. 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  Moderate pain with ROM of 5th MPJ     DATA:    CBC with Differential:    Lab Results   Component Value Date    WBC 11.0 09/02/2020    RBC 4.79 09/02/2020    HGB 14.1 09/02/2020    HCT 42.4 09/02/2020     09/02/2020    MCV 88.5 09/02/2020    MCH 29.5 09/02/2020    MCHC 33.3 09/02/2020    RDW 13.6 09/02/2020    BANDSPCT 1 07/15/2019    METASPCT 2 07/14/2019    LYMPHOPCT 6.6 09/02/2020    MONOPCT 3.3 09/02/2020    EOSPCT 2 07/22/2019    BASOPCT 0.4 09/02/2020    MONOSABS 0.4 09/02/2020    LYMPHSABS 0.7 09/02/2020    EOSABS 0.0 09/02/2020    BASOSABS 0.0 09/02/2020     CMP:    Lab Results   Component Value Date     09/03/2020    K 3.9 09/03/2020    CL 98 09/03/2020    CO2 22 09/03/2020    BUN 15 09/03/2020    CREATININE 0.9 09/03/2020    GFRAA >60 09/03/2020    AGRATIO 1.1 09/02/2020    LABGLOM >60 09/03/2020    GLUCOSE 199 09/03/2020    PROT 7.7 09/02/2020    LABALBU 4.0 09/02/2020    CALCIUM 8.6 09/03/2020    BILITOT 1.1 09/02/2020    ALKPHOS 112 09/02/2020    AST 24 09/02/2020    ALT 18 09/02/2020     HgBA1c:    Lab Results   Component Value Date    LABA1C 9.2 09/02/2020     Sed rate 50   Blood culture: Streptococcus agalactiae ([facility] Gr B) DNA Detected     Wound Culture:  Taken today   Diffuse soft tissue edema which is most prominent dorsally.  Soft tissue    ulceration along the plantar lateral aspect of the distal foot.         There is subtle mild increased T2 signal noted within the 5th metatarsal head    and the 5th digit.  No corresponding low T1 signal changes.  Findings may be    reactive however concern for early osteomyelitis given adjacent ulceration.               Radiology Review: Xrays  Left foot   Diffuse soft tissue edema is present. Lorilee Denise is    subtle deformity at the 5th MTP joint and bony erosion along the medial    aspect of the 5th metatarsal head.  Similar to the prior study.  Soft tissue    calcification present in this region.      MRI  Left foot reveals   Diffuse soft tissue edema which is most prominent dorsally.  Soft tissue    ulceration along the plantar lateral aspect of the distal foot.         There is subtle mild increased T2 signal noted within the 5th metatarsal head    and the 5th digit.  No corresponding low T1 signal changes.  Findings may be    reactive however concern for early osteomyelitis given adjacent ulceration.             IMPRESSION/RECOMMENDATIONS: This is a 54year old male with :   1. Sepsis due to left foot infection/ cellulitis and beginnings of osteomyelitis left 5th toe and 5 met head -ID consulted and BC + Blood culture: Streptococcus agalactiae ([facility] Gr B) DNA Detected. Xrays and MRI reviewed and agree with findings. Discussed with patient risks and benefits of surgical 5th toe and met head resection vs long term IV therapy. Patient wants to have 5th toe and met head resection - consent reviewed . NPO midnight and OR tomorrow. May need secondary surgery for closure of amputation a few days later. Rapid Covid    2. Ulcer full-thickness to SQ and deep tissue left foot -  Verbal consent given for debridement. Sharp excisional debridement to level of SQ tissue was preformed to remove any viable tissue and promote healing. Deep culture taken to guide ABx coverage.    3.  Diabetes type 2 with neuropathy, uncontrolled - Hemoglobin A1c 9.2 -

## 2020-09-03 NOTE — PROGRESS NOTES
Hospitalist Progress Note      PCP: Mariann Hill MD, MD    Date of Admission: 9/2/2020    Chief Complaint: Fever, foot pain    Hospital Course: See H&P     Subjective:   Patient is up in bed, comfortable. Not in distress. No new event overnight. Medications:  Reviewed    Infusion Medications   Scheduled Medications    glipiZIDE  15 mg Oral QAM AC    piperacillin-tazobactam  3.375 g Intravenous Q8H    sodium chloride flush  10 mL Intravenous 2 times per day    enoxaparin  40 mg Subcutaneous Daily    insulin lispro  0-12 Units Subcutaneous TID WC    insulin lispro  0-6 Units Subcutaneous Nightly    vancomycin  1,000 mg Intravenous Q12H     PRN Meds: sodium chloride flush, acetaminophen **OR** acetaminophen, polyethylene glycol, promethazine **OR** ondansetron      Intake/Output Summary (Last 24 hours) at 9/3/2020 1036  Last data filed at 9/3/2020 0858  Gross per 24 hour   Intake 1020 ml   Output 580 ml   Net 440 ml       Physical Exam Performed:    BP (!) 144/69   Pulse 76   Temp 100.1 °F (37.8 °C) (Oral)   Resp 16   Ht 5' 9\" (1.753 m)   Wt 233 lb 1 oz (105.7 kg)   SpO2 95%   BMI 34.42 kg/m²     General appearance: No apparent distress, appears stated age and cooperative. HEENT: Pupils equal, round, and reactive to light. Conjunctivae/corneas clear. Neck: Supple, with full range of motion. No jugular venous distention. Trachea midline. Respiratory:  Normal respiratory effort. Clear to auscultation, bilaterally without Rales/Wheezes/Rhonchi. Cardiovascular: Regular rate and rhythm with normal S1/S2 without murmurs, rubs or gallops. Abdomen: Soft, non-tender, non-distended with normal bowel sounds. Musculoskeletal: No clubbing, cyanosis or edema bilaterally. Full range of motion without deformity. Skin: Skin color, texture, turgor normal.  No rashes or lesions. Neurologic:  Neurovascularly intact without any focal sensory/motor deficits.  Cranial nerves: II-XII intact, grossly non-focal.  Psychiatric: Alert and oriented, thought content appropriate, normal insight  Capillary Refill: Brisk,< 3 seconds   Peripheral Pulses: +2 palpable, equal bilaterally       Labs:   Recent Labs     09/02/20  1515   WBC 11.0   HGB 14.1   HCT 42.4        Recent Labs     09/02/20  1515 09/03/20  0836   * 133*   K 4.7 3.9   CL 97* 98*   CO2 22 22   BUN 16 15   CREATININE 1.2 0.9   CALCIUM 9.0 8.6     Recent Labs     09/02/20  1515   AST 24   ALT 18   BILITOT 1.1*   ALKPHOS 112     No results for input(s): INR in the last 72 hours. Recent Labs     09/02/20  1515   TROPONINI <0.01       Urinalysis:      Lab Results   Component Value Date    NITRU Negative 07/08/2019    WBCUA 0-2 07/08/2019    RBCUA 10-20 07/08/2019    BLOODU SMALL 07/08/2019    SPECGRAV <=1.005 07/08/2019    GLUCOSEU Negative 07/08/2019       Radiology:  XR FOOT LEFT (MIN 3 VIEWS)   Final Result   Suspected bone destruction such as osteomyelitis involving the base of the   1st distal phalanx and lesser degree medial aspect of the adjacent proximal   phalanx across the joint space. This is new from the prior study. Osteomyelitis primarily considered particularly given the history. Other findings above. XR CHEST PORTABLE   Final Result   Unremarkable portable chest radiograph.          MRI FOOT LEFT W CONTRAST    (Results Pending)           Assessment/Plan:    Active Hospital Problems    Diagnosis    Sepsis ([facility] [address] [A41.9]     Evolving sepsis- due to suspected OM of Left foot, with fever of 103, tachycardia, lactate wnl, nl bolus given in ER  -bcx pending  -started vanc/zosyn on 9/2, pharm assisted dosing  -ID consulted, apprec assistance on abx  -Podiatry consulted for further recs  -MRI w contrast of left foot pending  -ivfs ordered(100/hr x 12hrs), monitor.     DM2- unsure if controlled or not  -a1c pending  Ac/hs bs  Med SSI  -on glipzide at home, continued     BPH- per emr, was on flomax and proscar but not

## 2020-09-03 NOTE — PLAN OF CARE
Problem: Serum Glucose Level - Abnormal:  Goal: Ability to maintain appropriate glucose levels will improve  Description: Ability to maintain appropriate glucose levels will improve  Outcome: Ongoing  Note: Pt at risk for elevated blood glucose levels r/t DM. Pt will have glucose levels monitored prior to breakfast, lunch, dinner, and bedtime. Elevated levels will be treated via SSI. Pt understands the need to monitor levels and has no further complaints at this time.

## 2020-09-04 ENCOUNTER — ANESTHESIA EVENT (OUTPATIENT)
Dept: OPERATING ROOM | Age: 55
DRG: 854 | End: 2020-09-04
Payer: COMMERCIAL

## 2020-09-04 ENCOUNTER — ANESTHESIA (OUTPATIENT)
Dept: OPERATING ROOM | Age: 55
DRG: 854 | End: 2020-09-04
Payer: COMMERCIAL

## 2020-09-04 VITALS — DIASTOLIC BLOOD PRESSURE: 52 MMHG | OXYGEN SATURATION: 95 % | SYSTOLIC BLOOD PRESSURE: 96 MMHG

## 2020-09-04 LAB
ANION GAP SERPL CALCULATED.3IONS-SCNC: 13 MMOL/L (ref 3–16)
BASOPHILS ABSOLUTE: 0.1 K/UL (ref 0–0.2)
BASOPHILS RELATIVE PERCENT: 0.7 %
BUN BLDV-MCNC: 14 MG/DL (ref 7–20)
CALCIUM SERPL-MCNC: 8.6 MG/DL (ref 8.3–10.6)
CHLORIDE BLD-SCNC: 103 MMOL/L (ref 99–110)
CO2: 23 MMOL/L (ref 21–32)
CREAT SERPL-MCNC: 0.9 MG/DL (ref 0.9–1.3)
EOSINOPHILS ABSOLUTE: 0.2 K/UL (ref 0–0.6)
EOSINOPHILS RELATIVE PERCENT: 2.5 %
GFR AFRICAN AMERICAN: >60
GFR NON-AFRICAN AMERICAN: >60
GLUCOSE BLD-MCNC: 100 MG/DL (ref 70–99)
GLUCOSE BLD-MCNC: 126 MG/DL (ref 70–99)
GLUCOSE BLD-MCNC: 92 MG/DL (ref 70–99)
GLUCOSE BLD-MCNC: 97 MG/DL (ref 70–99)
GLUCOSE BLD-MCNC: 97 MG/DL (ref 70–99)
HCT VFR BLD CALC: 36.3 % (ref 40.5–52.5)
HEMOGLOBIN: 12.1 G/DL (ref 13.5–17.5)
LYMPHOCYTES ABSOLUTE: 1.9 K/UL (ref 1–5.1)
LYMPHOCYTES RELATIVE PERCENT: 21.8 %
MCH RBC QN AUTO: 29.8 PG (ref 26–34)
MCHC RBC AUTO-ENTMCNC: 33.3 G/DL (ref 31–36)
MCV RBC AUTO: 89.4 FL (ref 80–100)
MONOCYTES ABSOLUTE: 1.2 K/UL (ref 0–1.3)
MONOCYTES RELATIVE PERCENT: 14.1 %
NEUTROPHILS ABSOLUTE: 5.2 K/UL (ref 1.7–7.7)
NEUTROPHILS RELATIVE PERCENT: 60.9 %
PDW BLD-RTO: 13.3 % (ref 12.4–15.4)
PERFORMED ON: ABNORMAL
PERFORMED ON: ABNORMAL
PERFORMED ON: NORMAL
PERFORMED ON: NORMAL
PLATELET # BLD: 195 K/UL (ref 135–450)
PMV BLD AUTO: 7.6 FL (ref 5–10.5)
POTASSIUM REFLEX MAGNESIUM: 3.6 MMOL/L (ref 3.5–5.1)
RBC # BLD: 4.06 M/UL (ref 4.2–5.9)
SODIUM BLD-SCNC: 139 MMOL/L (ref 136–145)
WBC # BLD: 8.6 K/UL (ref 4–11)

## 2020-09-04 PROCEDURE — 6370000000 HC RX 637 (ALT 250 FOR IP): Performed by: INTERNAL MEDICINE

## 2020-09-04 PROCEDURE — 87070 CULTURE OTHR SPECIMN AEROBIC: CPT

## 2020-09-04 PROCEDURE — 87040 BLOOD CULTURE FOR BACTERIA: CPT

## 2020-09-04 PROCEDURE — 87075 CULTR BACTERIA EXCEPT BLOOD: CPT

## 2020-09-04 PROCEDURE — 97110 THERAPEUTIC EXERCISES: CPT

## 2020-09-04 PROCEDURE — 2709999900 HC NON-CHARGEABLE SUPPLY: Performed by: PODIATRIST

## 2020-09-04 PROCEDURE — 2580000003 HC RX 258: Performed by: PODIATRIST

## 2020-09-04 PROCEDURE — 80048 BASIC METABOLIC PNL TOTAL CA: CPT

## 2020-09-04 PROCEDURE — 3600000014 HC SURGERY LEVEL 4 ADDTL 15MIN: Performed by: PODIATRIST

## 2020-09-04 PROCEDURE — 97116 GAIT TRAINING THERAPY: CPT

## 2020-09-04 PROCEDURE — 6360000002 HC RX W HCPCS: Performed by: NURSE ANESTHETIST, CERTIFIED REGISTERED

## 2020-09-04 PROCEDURE — 87186 SC STD MICRODIL/AGAR DIL: CPT

## 2020-09-04 PROCEDURE — 6360000002 HC RX W HCPCS: Performed by: PODIATRIST

## 2020-09-04 PROCEDURE — 2580000003 HC RX 258: Performed by: INTERNAL MEDICINE

## 2020-09-04 PROCEDURE — 6370000000 HC RX 637 (ALT 250 FOR IP): Performed by: PODIATRIST

## 2020-09-04 PROCEDURE — 2500000003 HC RX 250 WO HCPCS: Performed by: PODIATRIST

## 2020-09-04 PROCEDURE — 97162 PT EVAL MOD COMPLEX 30 MIN: CPT

## 2020-09-04 PROCEDURE — 87184 SC STD DISK METHOD PER PLATE: CPT

## 2020-09-04 PROCEDURE — 87205 SMEAR GRAM STAIN: CPT

## 2020-09-04 PROCEDURE — 88311 DECALCIFY TISSUE: CPT

## 2020-09-04 PROCEDURE — 3600000004 HC SURGERY LEVEL 4 BASE: Performed by: PODIATRIST

## 2020-09-04 PROCEDURE — 7100000001 HC PACU RECOVERY - ADDTL 15 MIN: Performed by: PODIATRIST

## 2020-09-04 PROCEDURE — 0Y6Y0Z0 DETACHMENT AT LEFT 5TH TOE, COMPLETE, OPEN APPROACH: ICD-10-PCS | Performed by: PODIATRIST

## 2020-09-04 PROCEDURE — 2500000003 HC RX 250 WO HCPCS: Performed by: NURSE ANESTHETIST, CERTIFIED REGISTERED

## 2020-09-04 PROCEDURE — 87077 CULTURE AEROBIC IDENTIFY: CPT

## 2020-09-04 PROCEDURE — 6360000002 HC RX W HCPCS: Performed by: INTERNAL MEDICINE

## 2020-09-04 PROCEDURE — 3700000001 HC ADD 15 MINUTES (ANESTHESIA): Performed by: PODIATRIST

## 2020-09-04 PROCEDURE — 85025 COMPLETE CBC W/AUTO DIFF WBC: CPT

## 2020-09-04 PROCEDURE — 6370000000 HC RX 637 (ALT 250 FOR IP): Performed by: NURSE PRACTITIONER

## 2020-09-04 PROCEDURE — 1200000000 HC SEMI PRIVATE

## 2020-09-04 PROCEDURE — 0JBR0ZZ EXCISION OF LEFT FOOT SUBCUTANEOUS TISSUE AND FASCIA, OPEN APPROACH: ICD-10-PCS | Performed by: PODIATRIST

## 2020-09-04 PROCEDURE — 3700000000 HC ANESTHESIA ATTENDED CARE: Performed by: PODIATRIST

## 2020-09-04 PROCEDURE — 99233 SBSQ HOSP IP/OBS HIGH 50: CPT | Performed by: INTERNAL MEDICINE

## 2020-09-04 PROCEDURE — 7100000000 HC PACU RECOVERY - FIRST 15 MIN: Performed by: PODIATRIST

## 2020-09-04 PROCEDURE — 36415 COLL VENOUS BLD VENIPUNCTURE: CPT

## 2020-09-04 PROCEDURE — 88305 TISSUE EXAM BY PATHOLOGIST: CPT

## 2020-09-04 RX ORDER — SODIUM CHLORIDE 9 MG/ML
INJECTION, SOLUTION INTRAVENOUS CONTINUOUS
Status: DISCONTINUED | OUTPATIENT
Start: 2020-09-04 | End: 2020-09-05

## 2020-09-04 RX ORDER — HYDROCODONE BITARTRATE AND ACETAMINOPHEN 5; 325 MG/1; MG/1
1 TABLET ORAL EVERY 6 HOURS PRN
Status: DISCONTINUED | OUTPATIENT
Start: 2020-09-04 | End: 2020-09-05

## 2020-09-04 RX ORDER — HYDROMORPHONE HCL 110MG/55ML
0.25 PATIENT CONTROLLED ANALGESIA SYRINGE INTRAVENOUS EVERY 5 MIN PRN
Status: DISCONTINUED | OUTPATIENT
Start: 2020-09-04 | End: 2020-09-04 | Stop reason: HOSPADM

## 2020-09-04 RX ORDER — BUPIVACAINE HYDROCHLORIDE 5 MG/ML
INJECTION, SOLUTION EPIDURAL; INTRACAUDAL PRN
Status: DISCONTINUED | OUTPATIENT
Start: 2020-09-04 | End: 2020-09-04 | Stop reason: ALTCHOICE

## 2020-09-04 RX ORDER — OXYCODONE HYDROCHLORIDE AND ACETAMINOPHEN 5; 325 MG/1; MG/1
1 TABLET ORAL PRN
Status: DISCONTINUED | OUTPATIENT
Start: 2020-09-04 | End: 2020-09-04 | Stop reason: HOSPADM

## 2020-09-04 RX ORDER — ONDANSETRON 2 MG/ML
INJECTION INTRAMUSCULAR; INTRAVENOUS PRN
Status: DISCONTINUED | OUTPATIENT
Start: 2020-09-04 | End: 2020-09-04 | Stop reason: SDUPTHER

## 2020-09-04 RX ORDER — LIDOCAINE HYDROCHLORIDE 10 MG/ML
INJECTION, SOLUTION EPIDURAL; INFILTRATION; INTRACAUDAL; PERINEURAL PRN
Status: DISCONTINUED | OUTPATIENT
Start: 2020-09-04 | End: 2020-09-04 | Stop reason: ALTCHOICE

## 2020-09-04 RX ORDER — LABETALOL HYDROCHLORIDE 5 MG/ML
5 INJECTION, SOLUTION INTRAVENOUS EVERY 10 MIN PRN
Status: DISCONTINUED | OUTPATIENT
Start: 2020-09-04 | End: 2020-09-04 | Stop reason: HOSPADM

## 2020-09-04 RX ORDER — LINEZOLID 2 MG/ML
600 INJECTION, SOLUTION INTRAVENOUS EVERY 12 HOURS
Status: DISCONTINUED | OUTPATIENT
Start: 2020-09-04 | End: 2020-09-06

## 2020-09-04 RX ORDER — ONDANSETRON 2 MG/ML
4 INJECTION INTRAMUSCULAR; INTRAVENOUS EVERY 10 MIN PRN
Status: DISCONTINUED | OUTPATIENT
Start: 2020-09-04 | End: 2020-09-04 | Stop reason: HOSPADM

## 2020-09-04 RX ORDER — LIDOCAINE HYDROCHLORIDE 20 MG/ML
INJECTION, SOLUTION EPIDURAL; INFILTRATION; INTRACAUDAL; PERINEURAL PRN
Status: DISCONTINUED | OUTPATIENT
Start: 2020-09-04 | End: 2020-09-04 | Stop reason: SDUPTHER

## 2020-09-04 RX ORDER — HYDRALAZINE HYDROCHLORIDE 20 MG/ML
5 INJECTION INTRAMUSCULAR; INTRAVENOUS EVERY 10 MIN PRN
Status: DISCONTINUED | OUTPATIENT
Start: 2020-09-04 | End: 2020-09-04 | Stop reason: HOSPADM

## 2020-09-04 RX ORDER — FENTANYL CITRATE 50 UG/ML
INJECTION, SOLUTION INTRAMUSCULAR; INTRAVENOUS PRN
Status: DISCONTINUED | OUTPATIENT
Start: 2020-09-04 | End: 2020-09-04 | Stop reason: SDUPTHER

## 2020-09-04 RX ORDER — TRAMADOL HYDROCHLORIDE 50 MG/1
50 TABLET ORAL ONCE
Status: COMPLETED | OUTPATIENT
Start: 2020-09-04 | End: 2020-09-04

## 2020-09-04 RX ORDER — OXYCODONE HYDROCHLORIDE AND ACETAMINOPHEN 5; 325 MG/1; MG/1
2 TABLET ORAL PRN
Status: DISCONTINUED | OUTPATIENT
Start: 2020-09-04 | End: 2020-09-04 | Stop reason: HOSPADM

## 2020-09-04 RX ORDER — HYDROMORPHONE HCL 110MG/55ML
0.5 PATIENT CONTROLLED ANALGESIA SYRINGE INTRAVENOUS EVERY 5 MIN PRN
Status: DISCONTINUED | OUTPATIENT
Start: 2020-09-04 | End: 2020-09-04 | Stop reason: HOSPADM

## 2020-09-04 RX ORDER — MEPERIDINE HYDROCHLORIDE 50 MG/ML
12.5 INJECTION INTRAMUSCULAR; INTRAVENOUS; SUBCUTANEOUS EVERY 5 MIN PRN
Status: DISCONTINUED | OUTPATIENT
Start: 2020-09-04 | End: 2020-09-04 | Stop reason: HOSPADM

## 2020-09-04 RX ORDER — PROPOFOL 10 MG/ML
INJECTION, EMULSION INTRAVENOUS PRN
Status: DISCONTINUED | OUTPATIENT
Start: 2020-09-04 | End: 2020-09-04 | Stop reason: SDUPTHER

## 2020-09-04 RX ADMIN — ONDANSETRON 4 MG: 2 INJECTION INTRAMUSCULAR; INTRAVENOUS at 14:19

## 2020-09-04 RX ADMIN — FENTANYL CITRATE 50 MCG: 50 INJECTION INTRAMUSCULAR; INTRAVENOUS at 14:19

## 2020-09-04 RX ADMIN — PIPERACILLIN AND TAZOBACTAM 3.38 G: 3; .375 INJECTION, POWDER, LYOPHILIZED, FOR SOLUTION INTRAVENOUS at 00:12

## 2020-09-04 RX ADMIN — TRAMADOL HYDROCHLORIDE 50 MG: 50 TABLET, FILM COATED ORAL at 20:59

## 2020-09-04 RX ADMIN — SODIUM CHLORIDE: 9 INJECTION, SOLUTION INTRAVENOUS at 11:00

## 2020-09-04 RX ADMIN — PIPERACILLIN AND TAZOBACTAM 3.38 G: 3; .375 INJECTION, POWDER, LYOPHILIZED, FOR SOLUTION INTRAVENOUS at 16:12

## 2020-09-04 RX ADMIN — PROPOFOL 200 MG: 10 INJECTION, EMULSION INTRAVENOUS at 14:09

## 2020-09-04 RX ADMIN — ACETAMINOPHEN 650 MG: 325 TABLET ORAL at 16:28

## 2020-09-04 RX ADMIN — LIDOCAINE HYDROCHLORIDE 60 MG: 20 INJECTION, SOLUTION EPIDURAL; INFILTRATION; INTRACAUDAL; PERINEURAL at 14:09

## 2020-09-04 RX ADMIN — SODIUM CHLORIDE: 9 INJECTION, SOLUTION INTRAVENOUS at 14:37

## 2020-09-04 RX ADMIN — FENTANYL CITRATE 50 MCG: 50 INJECTION INTRAMUSCULAR; INTRAVENOUS at 14:09

## 2020-09-04 RX ADMIN — HYDROCODONE BITARTRATE AND ACETAMINOPHEN 1 TABLET: 5; 325 TABLET ORAL at 17:59

## 2020-09-04 RX ADMIN — FENTANYL CITRATE 25 MCG: 50 INJECTION INTRAMUSCULAR; INTRAVENOUS at 14:17

## 2020-09-04 RX ADMIN — LINEZOLID 600 MG: 600 INJECTION, SOLUTION INTRAVENOUS at 23:27

## 2020-09-04 RX ADMIN — ACETAMINOPHEN 650 MG: 325 TABLET ORAL at 00:16

## 2020-09-04 RX ADMIN — PIPERACILLIN AND TAZOBACTAM 3.38 G: 3; .375 INJECTION, POWDER, LYOPHILIZED, FOR SOLUTION INTRAVENOUS at 08:36

## 2020-09-04 RX ADMIN — FENTANYL CITRATE 25 MCG: 50 INJECTION INTRAMUSCULAR; INTRAVENOUS at 14:16

## 2020-09-04 ASSESSMENT — PULMONARY FUNCTION TESTS
PIF_VALUE: 10
PIF_VALUE: 0
PIF_VALUE: 17
PIF_VALUE: 1
PIF_VALUE: 9
PIF_VALUE: 9
PIF_VALUE: 10
PIF_VALUE: 8
PIF_VALUE: 9
PIF_VALUE: 1
PIF_VALUE: 9
PIF_VALUE: 0
PIF_VALUE: 9
PIF_VALUE: 9
PIF_VALUE: 15
PIF_VALUE: 8
PIF_VALUE: 9
PIF_VALUE: 8
PIF_VALUE: 1
PIF_VALUE: 9
PIF_VALUE: 8
PIF_VALUE: 9
PIF_VALUE: 0
PIF_VALUE: 8
PIF_VALUE: 8
PIF_VALUE: 0
PIF_VALUE: 3
PIF_VALUE: 10
PIF_VALUE: 9
PIF_VALUE: 0
PIF_VALUE: 9
PIF_VALUE: 9
PIF_VALUE: 14
PIF_VALUE: 2
PIF_VALUE: 9
PIF_VALUE: 1
PIF_VALUE: 8
PIF_VALUE: 8
PIF_VALUE: 1
PIF_VALUE: 8
PIF_VALUE: 1
PIF_VALUE: 9
PIF_VALUE: 8
PIF_VALUE: 8
PIF_VALUE: 1
PIF_VALUE: 10
PIF_VALUE: 8
PIF_VALUE: 1

## 2020-09-04 ASSESSMENT — PAIN SCALES - GENERAL
PAINLEVEL_OUTOF10: 7
PAINLEVEL_OUTOF10: 7
PAINLEVEL_OUTOF10: 8
PAINLEVEL_OUTOF10: 0

## 2020-09-04 ASSESSMENT — PAIN DESCRIPTION - ORIENTATION: ORIENTATION: LEFT

## 2020-09-04 ASSESSMENT — PAIN DESCRIPTION - PAIN TYPE: TYPE: ACUTE PAIN

## 2020-09-04 ASSESSMENT — PAIN DESCRIPTION - LOCATION: LOCATION: FOOT

## 2020-09-04 NOTE — PROGRESS NOTES
Hospitalist Progress Note      PCP: Dexter Bray MD, MD    Date of Admission: 9/2/2020    Chief Complaint: Fever, foot pain    Hospital Course: See H&P     Subjective:   Patient is up in bed, comfortable. Not in distress. No new event overnight. Medications:  Reviewed    Infusion Medications    sodium chloride      dextrose       Scheduled Medications    glipiZIDE  15 mg Oral QAM AC    piperacillin-tazobactam  3.375 g Intravenous Q8H    sodium chloride flush  10 mL Intravenous 2 times per day    enoxaparin  40 mg Subcutaneous Daily    insulin lispro  0-12 Units Subcutaneous TID WC    insulin lispro  0-6 Units Subcutaneous Nightly     PRN Meds: glucose, dextrose, glucagon (rDNA), dextrose, sodium chloride flush, acetaminophen **OR** acetaminophen, polyethylene glycol, promethazine **OR** ondansetron      Intake/Output Summary (Last 24 hours) at 9/4/2020 1029  Last data filed at 9/4/2020 0700  Gross per 24 hour   Intake 1020 ml   Output 1300 ml   Net -280 ml       Physical Exam Performed:    /64   Pulse 61   Temp 97.9 °F (36.6 °C) (Oral)   Resp 16   Ht 5' 9\" (1.753 m)   Wt 233 lb 1 oz (105.7 kg)   SpO2 96%   BMI 34.42 kg/m²     General appearance: No apparent distress, appears stated age and cooperative. HEENT: Pupils equal, round, and reactive to light. Conjunctivae/corneas clear. Neck: Supple, with full range of motion. No jugular venous distention. Trachea midline. Respiratory:  Normal respiratory effort. Clear to auscultation, bilaterally without Rales/Wheezes/Rhonchi. Cardiovascular: Regular rate and rhythm with normal S1/S2 without murmurs, rubs or gallops. Abdomen: Soft, non-tender, non-distended with normal bowel sounds. Musculoskeletal: No clubbing, cyanosis or edema bilaterally. Full range of motion without deformity. Skin: Skin color, texture, turgor normal.  No rashes or lesions. Neurologic:  Neurovascularly intact without any focal sensory/motor deficits.

## 2020-09-04 NOTE — PLAN OF CARE
Problem: Pain:  Goal: Pain level will decrease  Description: Pain level will decrease  Outcome: Ongoing  Goal: Control of acute pain  Description: Control of acute pain  Outcome: Ongoing  Goal: Control of chronic pain  Description: Control of chronic pain  Outcome: Ongoing     Problem: Falls - Risk of:  Goal: Will remain free from falls  Description: Will remain free from falls  Outcome: Ongoing  Goal: Absence of physical injury  Description: Absence of physical injury  Outcome: Ongoing     Problem: Serum Glucose Level - Abnormal:  Goal: Ability to maintain appropriate glucose levels will improve  Description: Ability to maintain appropriate glucose levels will improve  Outcome: Ongoing

## 2020-09-04 NOTE — ANESTHESIA PRE PROCEDURE
Department of Anesthesiology  Preprocedure Note       Name:  Fernanda Cordoba   Age:  54 y.o.  :  1965                                          MRN:  [id number]         Date:  2020      Surgeon: Evan Martines):  Candice Chambers DPM    Procedure: Procedure(s):  AMPUTATION FIFTH TOE, AND FIFTH METATARSAL HEAD OF LEFT FOOT WITH INCISION AND DEBRIDEMENT ([address] WITH ANTIBIOTIC, WANTS SAGITTAL SAW)    Medications prior to admission:   Prior to Admission medications    Medication Sig Start Date End Date Taking?  Authorizing Provider   finasteride (PROSCAR) 5 MG tablet Take 1 tablet by mouth daily  Patient not taking: Reported on 9/3/2019 7/16/19   Julia Kruger MD   tamsulosin (FLOMAX) 0.4 MG capsule Take 1 capsule by mouth daily  Patient not taking: Reported on 9/3/2019 7/16/19   Julia Kruger MD   glipiZIDE (GLUCOTROL) 5 MG tablet Take 15 mg by mouth daily as needed 19   Historical Provider, MD   cyclobenzaprine (FLEXERIL) 10 MG tablet Take 10 mg by mouth 3 times daily as needed    Historical Provider, MD       Current medications:    Current Facility-Administered Medications   Medication Dose Route Frequency Provider Last Rate Last Dose    0.9 % sodium chloride infusion   Intravenous Continuous Arturo Schultz  mL/hr at 20 1100      glucose (GLUTOSE) 40 % oral gel 15 g  15 g Oral PRN Arturo Schultz MD        dextrose 50 % IV solution  12.5 g Intravenous PRN Arturo Schultz MD        glucagon (rDNA) injection 1 mg  1 mg Intramuscular PRN Arturo Schultz MD        dextrose 5 % solution  100 mL/hr Intravenous PRN Arturo Schultz MD        glipiZIDE (GLUCOTROL) tablet 15 mg  15 mg Oral QAM AC Alfonzo Jane MD   Stopped at 20 0622    piperacillin-tazobactam (ZOSYN) 3.375 g in dextrose 5 % 50 mL IVPB extended infusion (mini-bag)  3.375 g Intravenous Mirella Morales MD   Stopped at 20 1257    sodium chloride flush 0.9 % injection 10 mL  10 mL Intravenous 2 times per GLUCOSE 97 09/04/2020    PROT 7.7 09/02/2020    CALCIUM 8.6 09/04/2020    BILITOT 1.1 09/02/2020    ALKPHOS 112 09/02/2020    AST 24 09/02/2020    ALT 18 09/02/2020       POC Tests:   Recent Labs     09/04/20  1144   POCGLU 92       Coags: No results found for: PROTIME, INR, APTT    HCG (If Applicable): No results found for: PREGTESTUR, PREGSERUM, HCG, HCGQUANT     ABGs: No results found for: PHART, PO2ART, PZH3LMU, JKH2SCL, BEART, D3EONCJR     Type & Screen (If Applicable):  No results found for: LABABO, LABRH    Drug/Infectious Status (If Applicable):  No results found for: HIV, HEPCAB    COVID-19 Screening (If Applicable):   Lab Results   Component Value Date    COVID19 Not Detected 09/03/2020         Anesthesia Evaluation  Patient summary reviewed and Nursing notes reviewed no history of anesthetic complications:   Airway: Mallampati: II  TM distance: >3 FB   Neck ROM: full  Mouth opening: > = 3 FB Dental: normal exam         Pulmonary:Negative Pulmonary ROS                              Cardiovascular:    (+) hypertension:,                   Neuro/Psych:   (+) neuromuscular disease (neuropathy):,             GI/Hepatic/Renal: Neg GI/Hepatic/Renal ROS       (-) GERD, liver disease and no renal disease       Endo/Other:    (+) DiabetesType II DM, , .                 Abdominal:           Vascular: negative vascular ROS. Anesthesia Plan      general     ASA 3       Induction: intravenous. Anesthetic plan and risks discussed with patient. Plan discussed with CRNA. All questions answered and agrees with plan.         Agatha Hartley MD   9/4/2020

## 2020-09-04 NOTE — ANESTHESIA POSTPROCEDURE EVALUATION
Department of Anesthesiology  Postprocedure Note    Patient: Tenzin Birmingham  MRN: [id number]  YOB: 1965  Date of evaluation: 9/4/2020    Procedure Summary     Date:  09/04/20 Room / Location:  [address] [place] [address] [address] / Dat    Anesthesia Start:  0184 Anesthesia Stop:  8262    Procedure:  AMPUTATION FIFTH TOE, AND FIFTH METATARSAL HEAD OF LEFT FOOT WITH INCISION AND DEBRIDEMENT ([address] WITH ANTIBIOTIC, WANTS SAGITTAL SAW) (Left Foot) Diagnosis:  (LEFT FOOT DIABETIC ULCER)    Surgeon:  Arpita Abbott DPM Responsible Provider:  Shashank Perez MD    Anesthesia Type:  general ASA Status:  3        Anesthesia Type: general    Gary Phase I: Gary Score: 10    Gary Phase II:      Last vitals: Reviewed and per EMR flowsheets.      Anesthesia Post Evaluation   Anesthetic Problems: no   Cardiovascular System Stable: yes  Respiratory Function: Airway Patent yes  ETT no  Ventilator no  Level of consciousness: awake, alert and oriented  Post-op pain: adequate analgesia  Hydration Adequate: yes  Nausea/Vomiting:no  Other Issues:     Beryle Castellani, MD

## 2020-09-04 NOTE — PROGRESS NOTES
Infectious Disease Follow up Notes  Admit Date: 9/2/2020  Hospital Day: 3    Antibiotics :   IV Zosyn      CHIEF COMPLAINT:     Left diabetic foot infection  Sepsis  Group b strep bacteremia     Subjective interval History :  54 y.o. man with DM+ neuropathy admitted with Left foot infection and sepsis, blood cx group B strep and started on IV abx. Repeat cx in process, seen by Podiatry MRI lEFT 5 th metatarsal head with concern for osteomyelitis. S/p surgery and OR cx in process post op dressing present and OPAT d/w pt he had experience with PICC line in the past      Past Medical History:    Past Medical History:   Diagnosis Date    Diabetes mellitus ([address]     Foot ulcer ([address]     Right foot/diabetic       Past Surgical History:    Past Surgical History:   Procedure Laterality Date    ANKLE SURGERY      FOOT DEBRIDEMENT Right 7/11/2019    RIGHT FOOT  INCISION AND DRAINAGE WITH 5TH METATARSAL BONE RESECTION performed by Sandy Bob DPM at [address] [facility]      right ankle    TOE AMPUTATION Right 7/8/2019    incision and drainage right foot with possible partial 5th ray [facility]putation [facility]       Current Medications:    No outpatient medications have been marked as taking for the 9/2/20 encounter [facility] Encounter). Allergies:  Metformin and related    Immunizations : There is no immunization history on file for this patient. Social History:    Social History     Tobacco Use    Smoking status: Never Smoker    Smokeless tobacco: Never Used   Substance Use Topics    Alcohol use: No    Drug use: No     Social History     Tobacco Use   Smoking Status Never Smoker   Smokeless Tobacco Never Used       Family History : no DVT  NO copd     REVIEW OF SYSTEMS:    No fever / chills / sweats. No weight loss. No visual change, eye pain, eye discharge.     No oral lesion, sore throat, dysphagia. Denies cough / sputum/Sob   Denies chest pain, palpitations/ dizziness  Denies nausea/ vomiting/abdominal pain/diarrhea. Denies dysuria or change in urinary function. Denies joint swelling or pain. No myalgia, arthralgia. No rashes, skin lesions   Denies focal weakness, sensory change or other neurologic symptoms  No lymph node swelling or tenderness.     Fevers, Left foot infection     PHYSICAL EXAM:      Vitals:  T max  103.2   /64   Pulse 61   Temp 97.9 °F (36.6 °C) (Oral)   Resp 16   Ht 5' 9\" (1.753 m)   Wt 233 lb 1 oz (105.7 kg)   SpO2 96%   BMI 34.42 kg/m²     General Appearance: alert,in no acute distress, no pallor, no icterus older than state age chronic ill appearing man   Skin: warm and dry, no rash or erythema  Head: normocephalic and atraumatic  Eyes: pupils equal, round, and reactive to light, conjunctivae normal  ENT: tympanic membrane, external ear and ear canal normal bilaterally, nose without deformity, nasal mucosa and turbinates normal without polyps  Neck: supple and non-tender without mass, no thyromegaly  no cervical lymphadenopathy  Pulmonary/Chest: clear to auscultation bilaterally- no wheezes, rales or rhonchi, normal air movement, no respiratory distress  Cardiovascular: normal rate, regular rhythm, normal S1 and S2, no murmurs, rubs, clicks, or gallops, no carotid bruits  Abdomen: soft, non-tender, non-distended, normal bowel sounds, no masses or organomegaly  Extremities: no cyanosis, clubbing or edema  Musculoskeletal: normal range of motion, no joint swelling, deformity or tenderness  Neurologic: reflexes normal and symmetric, no cranial nerve deficit  Psych:  Orientation, sensorium, mood normal  Lines: IV  Left foot post op dressing+     Data Review:    Lab Results   Component Value Date    WBC 8.6 09/04/2020    HGB 12.1 (L) 09/04/2020    HCT 36.3 (L) 09/04/2020    MCV 89.4 09/04/2020     09/04/2020     Lab Results   Component Value Date CREATININE 0.9 09/04/2020    BUN 14 09/04/2020     09/04/2020    K 3.6 09/04/2020     09/04/2020    CO2 23 09/04/2020       Hepatic Function Panel:   Lab Results   Component Value Date    ALKPHOS 112 09/02/2020    ALT 18 09/02/2020    AST 24 09/02/2020    PROT 7.7 09/02/2020    BILITOT 1.1 09/02/2020    LABALBU 4.0 09/02/2020       UA:  Lab Results   Component Value Date    COLORU Yellow 07/08/2019    CLARITYU Clear 07/08/2019    GLUCOSEU Negative 07/08/2019    BILIRUBINUR Negative 07/08/2019    KETUA Negative 07/08/2019    SPECGRAV <=1.005 07/08/2019    BLOODU SMALL 07/08/2019    PHUR 6.5 07/08/2019    PROTEINU Negative 07/08/2019    UROBILINOGEN 0.2 07/08/2019    NITRU Negative 07/08/2019    LEUKOCYTESUR Negative 07/08/2019    LABMICR YES 07/08/2019    URINETYPE Not Specified 07/08/2019      Urine Microscopic:   Lab Results   Component Value Date    WBCUA 0-2 07/08/2019    RBCUA 10-20 07/08/2019    EPIU 0-2 07/08/2019      Ref Range & Units  09/02/20 2001   Hemoglobin A1C  See comment %  9.2    Comment: Comment:   Diagnosis of Diabetes: > or = 6.5%   Increased risk of diabetes (Prediabetes): 5.7-6.4%   Glycemic Control: Nonpregnant Adults: <7.0%                     Pregnant: <6.0%        ESR 50       MICRO: cultures reviewed and updated by me            Culture, Blood 1 [[id number]]  (Abnormal)  Collected: 09/02/20 1515    Order Status: Completed  Specimen: Blood  Updated: 09/03/20 2207     Blood Culture, Routine  --Abnormal       Gram stain Aerobic bottle:   Gram positive cocci in chains and/or pairs   resembling Streptococcus   Information to follow   Abnormal      Narrative:      ORDER#: [id number]                          ORDERED BY: MITCHEL POTTER   SOURCE: Blood Antecubital-Lef              COLLECTED:  09/02/20 15:15   ANTIBIOTICS AT COLLEEN. :                      RECEIVED :  09/03/20 04:09   CALL  [place]  [facility] tel. [phone],   Previous panic on this admission - call not needed per SOP, 09/03/2020 not been identified. (CLSI M100)    Narrative:      ORDER#: [id number]                          ORDERED BY: Jacquelyn Velasquez   SOURCE: Blood Hand, Right                  COLLECTED:  09/02/20 16:00   ANTIBIOTICS AT COLLEEN. :                      RECEIVED :  09/03/20 04:09   If child <=2 yrs old please draw pediatric bottle. ~Blood Culture #2   Performed at:   [place] [address] [address] 234 beds at the [facility] [address]   Phone [phone]    Culture, Blood 2 [[id number]]      Order Status: Canceled  Specimen: Blood     Culture, Blood 1 [[id number]]   Collected: 09/02/20 0000    Order Status: Canceled  Specimen: Blood           IMAGING:    MRI FOOT LEFT W WO CONTRAST   Final Result   Diffuse soft tissue edema which is most prominent dorsally. Soft tissue   ulceration along the plantar lateral aspect of the distal foot. There is subtle mild increased T2 signal noted within the 5th metatarsal head   and the 5th digit. No corresponding low T1 signal changes. Findings may be   reactive however concern for early osteomyelitis given adjacent ulceration. XR FOOT LEFT (MIN 3 VIEWS)   Final Result   Suspected bone destruction such as osteomyelitis involving the base of the   1st distal phalanx and lesser degree medial aspect of the adjacent proximal   phalanx across the joint space. This is new from the prior study. Osteomyelitis primarily considered particularly given the history. Other findings above. XR CHEST PORTABLE   Final Result   Unremarkable portable chest radiograph.                All the pertinent images and reports for the current Hospitalization were reviewed by me     Scheduled Meds:   glipiZIDE  15 mg Oral QAM AC    piperacillin-tazobactam  3.375 g Intravenous Q8H    sodium chloride flush  10 mL Intravenous 2 times per day    enoxaparin  40 mg Subcutaneous Daily    insulin lispro  0-12 Units Subcutaneous TID WC    insulin lispro  0-6 Units Subcutaneous Nightly       Continuous Infusions:   sodium chloride      dextrose         PRN Meds:  glucose, dextrose, glucagon (rDNA), dextrose, sodium chloride flush, acetaminophen **OR** acetaminophen, polyethylene glycol, promethazine **OR** ondansetron      Assessment:     Patient Active Problem List   Diagnosis    [facility]el[facility]    Right foot infection    Hypertension associated with diabetes ([facility]    Type 2 diabetes mellitus ([facility]    [facility]    Uncontrolled type 2 diabetes mellitus with hyperglycemia (HCC)    High fever    Bandemia    Closed displaced fracture of fifth metatarsal bone of right foot    Elevated sed rate    Elevated C-reactive protein (CRP)    Overweight    Failure of outpatient treatment    Diabetic polyneuropathy associated with type 2 diabetes mellitus ([facility]    Receiving intravenous antibiotic treatment as outpatient    Weight loss counseling, encounter for     Sepsis  High fevers  Left complicated diabetic foot infection  Group B strep bacteremia  Left 5 th toe osteomyelitis  DM poor control  Diabetic Neuropathy      Fevers from high grade bacteremia and Left foot infection s/p surgery and OR cx in process - repeat Blood cx sent anticipate IV abx at d/c given the sepsis and complicate Left foot infection     Labs, Microbiology, Radiology and all the pertinent results from current hospitalization and  care every where were reviewed  by me as a part of the evaluation   Plan:   1. Cont IV Zosyn x will cover Group B strep and Diabetic foot infection   2. Repeat blood cx sent   3. OR cx in process  4. Blood cx now reported with staph aureus as well after the rounds and current IV abx will cover MSSA but not MRSA  5. IV Linezolid x 600  Mg x Q 12 HRS ADDED pending Staph aureus sensitivities  6. Will need PICC LINE and Home IV abx at d/c   7.  Anticipate he will stay here through Monday as we need to clear the Blood stream and get Home IV abx set up       Discussed with patient/Family and Nursing staff   Risk of Complications/Morbidity: High      · Illness(es)/ Infection present that pose threat to bodily function. · There is potential for severe exacerbation of infection/side effects of treatment. · Therapy requires intensive monitoring for antimicrobial agent toxicity. Thanks for allowing me to participate in your patient's care and please call me with any questions or concerns.     Nicole Cazares MD  Infectious Disease  [facility] ([facility]) Physician  Phone: [phone]   Fax : [phone]

## 2020-09-04 NOTE — INTERVAL H&P NOTE
Update History & Physical    The patient's History and Physical of September 2, 2020  was reviewed with the patient and I examined the patient. There was no change. The surgical site was confirmed by the patient and me. Plan: The risks, benefits, expected outcome, and alternative to the recommended procedure have been discussed with the patient. Patient understands and wants to proceed with the procedure.      Electronically signed by Karlene Ramires DPM on 9/4/2020 at 12:45 PM

## 2020-09-04 NOTE — PROGRESS NOTES
Pt brought to PACU. Report obtained from OR RN and anesthesia. Pt placed on monitor Pt awake and taking and denies c/o.  Amanda Small

## 2020-09-04 NOTE — PROGRESS NOTES
Patient returned from PACU report received. Patient non-weight bearing to left foot. PT ordered. Pain medication ordered.

## 2020-09-05 LAB
ANION GAP SERPL CALCULATED.3IONS-SCNC: 10 MMOL/L (ref 3–16)
BLOOD CULTURE, ROUTINE: ABNORMAL
BLOOD CULTURE, ROUTINE: ABNORMAL
BUN BLDV-MCNC: 14 MG/DL (ref 7–20)
CALCIUM SERPL-MCNC: 8.2 MG/DL (ref 8.3–10.6)
CHLORIDE BLD-SCNC: 102 MMOL/L (ref 99–110)
CO2: 25 MMOL/L (ref 21–32)
CREAT SERPL-MCNC: 0.8 MG/DL (ref 0.9–1.3)
CULTURE, BLOOD 2: ABNORMAL
GFR AFRICAN AMERICAN: >60
GFR NON-AFRICAN AMERICAN: >60
GLUCOSE BLD-MCNC: 158 MG/DL (ref 70–99)
GLUCOSE BLD-MCNC: 170 MG/DL (ref 70–99)
GLUCOSE BLD-MCNC: 181 MG/DL (ref 70–99)
GLUCOSE BLD-MCNC: 236 MG/DL (ref 70–99)
GLUCOSE BLD-MCNC: 253 MG/DL (ref 70–99)
ORGANISM: ABNORMAL
PERFORMED ON: ABNORMAL
POTASSIUM REFLEX MAGNESIUM: 4.4 MMOL/L (ref 3.5–5.1)
SODIUM BLD-SCNC: 137 MMOL/L (ref 136–145)

## 2020-09-05 PROCEDURE — 6370000000 HC RX 637 (ALT 250 FOR IP): Performed by: PODIATRIST

## 2020-09-05 PROCEDURE — 6360000002 HC RX W HCPCS: Performed by: PODIATRIST

## 2020-09-05 PROCEDURE — 1200000000 HC SEMI PRIVATE

## 2020-09-05 PROCEDURE — 2580000003 HC RX 258: Performed by: PODIATRIST

## 2020-09-05 PROCEDURE — 80048 BASIC METABOLIC PNL TOTAL CA: CPT

## 2020-09-05 PROCEDURE — 36415 COLL VENOUS BLD VENIPUNCTURE: CPT

## 2020-09-05 PROCEDURE — 6360000002 HC RX W HCPCS: Performed by: INTERNAL MEDICINE

## 2020-09-05 PROCEDURE — 6370000000 HC RX 637 (ALT 250 FOR IP): Performed by: INTERNAL MEDICINE

## 2020-09-05 RX ORDER — OXYCODONE HYDROCHLORIDE AND ACETAMINOPHEN 5; 325 MG/1; MG/1
1 TABLET ORAL EVERY 4 HOURS PRN
Status: DISCONTINUED | OUTPATIENT
Start: 2020-09-05 | End: 2020-09-09 | Stop reason: HOSPADM

## 2020-09-05 RX ORDER — SODIUM CHLORIDE 9 MG/ML
INJECTION, SOLUTION INTRAVENOUS
Status: DISPENSED
Start: 2020-09-05 | End: 2020-09-05

## 2020-09-05 RX ADMIN — PIPERACILLIN AND TAZOBACTAM 3.38 G: 3; .375 INJECTION, POWDER, LYOPHILIZED, FOR SOLUTION INTRAVENOUS at 00:07

## 2020-09-05 RX ADMIN — Medication 10 ML: at 08:55

## 2020-09-05 RX ADMIN — OXYCODONE HYDROCHLORIDE AND ACETAMINOPHEN 1 TABLET: 5; 325 TABLET ORAL at 11:05

## 2020-09-05 RX ADMIN — LINEZOLID 600 MG: 600 INJECTION, SOLUTION INTRAVENOUS at 11:01

## 2020-09-05 RX ADMIN — PIPERACILLIN AND TAZOBACTAM 3.38 G: 3; .375 INJECTION, POWDER, LYOPHILIZED, FOR SOLUTION INTRAVENOUS at 08:55

## 2020-09-05 RX ADMIN — INSULIN LISPRO 2 UNITS: 100 INJECTION, SOLUTION INTRAVENOUS; SUBCUTANEOUS at 09:01

## 2020-09-05 RX ADMIN — ENOXAPARIN SODIUM 40 MG: 40 INJECTION SUBCUTANEOUS at 08:55

## 2020-09-05 RX ADMIN — INSULIN LISPRO 2 UNITS: 100 INJECTION, SOLUTION INTRAVENOUS; SUBCUTANEOUS at 16:51

## 2020-09-05 RX ADMIN — LINEZOLID 600 MG: 600 INJECTION, SOLUTION INTRAVENOUS at 21:09

## 2020-09-05 RX ADMIN — HYDROCODONE BITARTRATE AND ACETAMINOPHEN 1 TABLET: 5; 325 TABLET ORAL at 00:07

## 2020-09-05 RX ADMIN — INSULIN LISPRO 6 UNITS: 100 INJECTION, SOLUTION INTRAVENOUS; SUBCUTANEOUS at 12:38

## 2020-09-05 RX ADMIN — OXYCODONE HYDROCHLORIDE AND ACETAMINOPHEN 1 TABLET: 5; 325 TABLET ORAL at 16:53

## 2020-09-05 RX ADMIN — POLYETHYLENE GLYCOL 3350 17 G: 17 POWDER, FOR SOLUTION ORAL at 21:25

## 2020-09-05 RX ADMIN — INSULIN LISPRO 1 UNITS: 100 INJECTION, SOLUTION INTRAVENOUS; SUBCUTANEOUS at 21:09

## 2020-09-05 RX ADMIN — HYDROCODONE BITARTRATE AND ACETAMINOPHEN 1 TABLET: 5; 325 TABLET ORAL at 08:55

## 2020-09-05 RX ADMIN — PIPERACILLIN AND TAZOBACTAM 3.38 G: 3; .375 INJECTION, POWDER, LYOPHILIZED, FOR SOLUTION INTRAVENOUS at 16:11

## 2020-09-05 ASSESSMENT — PAIN SCALES - GENERAL
PAINLEVEL_OUTOF10: 4
PAINLEVEL_OUTOF10: 7
PAINLEVEL_OUTOF10: 6
PAINLEVEL_OUTOF10: 7
PAINLEVEL_OUTOF10: 4
PAINLEVEL_OUTOF10: 5
PAINLEVEL_OUTOF10: 6

## 2020-09-05 ASSESSMENT — PAIN DESCRIPTION - LOCATION
LOCATION: FOOT

## 2020-09-05 ASSESSMENT — PAIN DESCRIPTION - PAIN TYPE
TYPE: ACUTE PAIN
TYPE: ACUTE PAIN;SURGICAL PAIN;NEUROPATHIC PAIN
TYPE: ACUTE PAIN

## 2020-09-05 ASSESSMENT — PAIN DESCRIPTION - FREQUENCY
FREQUENCY: CONTINUOUS
FREQUENCY: CONTINUOUS

## 2020-09-05 ASSESSMENT — PAIN DESCRIPTION - DESCRIPTORS
DESCRIPTORS: ACHING;DISCOMFORT
DESCRIPTORS: ACHING;DISCOMFORT

## 2020-09-05 ASSESSMENT — PAIN DESCRIPTION - ORIENTATION
ORIENTATION: LEFT

## 2020-09-05 ASSESSMENT — PAIN DESCRIPTION - ONSET
ONSET: ON-GOING
ONSET: ON-GOING

## 2020-09-05 ASSESSMENT — PAIN DESCRIPTION - PROGRESSION
CLINICAL_PROGRESSION: GRADUALLY IMPROVING
CLINICAL_PROGRESSION: NOT CHANGED
CLINICAL_PROGRESSION: GRADUALLY WORSENING

## 2020-09-05 NOTE — PROGRESS NOTES
Culture, Blood 2  09/02/2020  4:00 PM  [address] for   Sensitivity to follow    Isolated two of two sets        Current Inpatient Medications    Current Facility-Administered Medications: sodium chloride 0.9 % infusion, , ,   0.9 % sodium chloride infusion, , Intravenous, Continuous  HYDROcodone-acetaminophen (NORCO) 5-325 MG per tablet 1 tablet, 1 tablet, Oral, Q6H PRN  linezolid (ZYVOX) IVPB 600 mg, 600 mg, Intravenous, Q12H  glucose (GLUTOSE) 40 % oral gel 15 g, 15 g, Oral, PRN  dextrose 50 % IV solution, 12.5 g, Intravenous, PRN  glucagon (rDNA) injection 1 mg, 1 mg, Intramuscular, PRN  dextrose 5 % solution, 100 mL/hr, Intravenous, PRN  glipiZIDE (GLUCOTROL) tablet 15 mg, 15 mg, Oral, QAM AC  piperacillin-tazobactam (ZOSYN) 3.375 g in dextrose 5 % 50 mL IVPB extended infusion (mini-bag), 3.375 g, Intravenous, Q8H  sodium chloride flush 0.9 % injection 10 mL, 10 mL, Intravenous, 2 times per day  sodium chloride flush 0.9 % injection 10 mL, 10 mL, Intravenous, PRN  acetaminophen (TYLENOL) tablet 650 mg, 650 mg, Oral, Q6H PRN **OR** acetaminophen (TYLENOL) suppository 650 mg, 650 mg, Rectal, Q6H PRN  polyethylene glycol (GLYCOLAX) packet 17 g, 17 g, Oral, Daily PRN  promethazine (PHENERGAN) tablet 12.5 mg, 12.5 mg, Oral, Q6H PRN **OR** ondansetron (ZOFRAN) injection 4 mg, 4 mg, Intravenous, Q6H PRN  enoxaparin (LOVENOX) injection 40 mg, 40 mg, Subcutaneous, Daily  insulin lispro (HUMALOG) injection vial 0-12 Units, 0-12 Units, Subcutaneous, TID WC  insulin lispro (HUMALOG) injection vial 0-6 Units, 0-6 Units, Subcutaneous, Nightly    ASSESSMENT AND PLAN    Cellulitis with osteomyelitis left 5th toe and met head - POD #1 amputation 5th toe and met head -  Non weight bearing to left foot. Likely okay for Oral Abx - felt definitive in amputation but also sent clearance fragment of bone to help determine length of abx coverage. Okay for DC once ID and other services signs off.    Pain - will

## 2020-09-05 NOTE — PROGRESS NOTES
Hospitalist Progress Note      PCP: Dexter Bray MD, MD    Date of Admission: 9/2/2020    Chief Complaint: Fever, foot pain    Hospital Course: See H&P     Subjective:   Patient is up in bed, comfortable. Not in distress. No new event overnight. Medications:  Reviewed    Infusion Medications    sodium chloride      dextrose       Scheduled Medications    linezolid  600 mg Intravenous Q12H    glipiZIDE  15 mg Oral QAM AC    piperacillin-tazobactam  3.375 g Intravenous Q8H    sodium chloride flush  10 mL Intravenous 2 times per day    enoxaparin  40 mg Subcutaneous Daily    insulin lispro  0-12 Units Subcutaneous TID WC    insulin lispro  0-6 Units Subcutaneous Nightly     PRN Meds: oxyCODONE-acetaminophen, glucose, dextrose, glucagon (rDNA), dextrose, sodium chloride flush, acetaminophen **OR** acetaminophen, polyethylene glycol, promethazine **OR** ondansetron      Intake/Output Summary (Last 24 hours) at 9/5/2020 1109  Last data filed at 9/5/2020 0858  Gross per 24 hour   Intake 3754.25 ml   Output 0 ml   Net 3754.25 ml       Physical Exam Performed:    /73   Pulse 65   Temp 98.4 °F (36.9 °C) (Oral)   Resp 17   Ht 5' 9\" (1.753 m)   Wt 233 lb 1 oz (105.7 kg)   SpO2 95%   BMI 34.42 kg/m²     General appearance: No apparent distress, appears stated age and cooperative. HEENT: Pupils equal, round, and reactive to light. Conjunctivae/corneas clear. Neck: Supple, with full range of motion. No jugular venous distention. Trachea midline. Respiratory:  Normal respiratory effort. Clear to auscultation, bilaterally without Rales/Wheezes/Rhonchi. Cardiovascular: Regular rate and rhythm with normal S1/S2 without murmurs, rubs or gallops. Abdomen: Soft, non-tender, non-distended with normal bowel sounds. Musculoskeletal: No clubbing, cyanosis or edema bilaterally. Full range of motion without deformity. Skin: Skin color, texture, turgor normal.  No rashes or lesions.   Neurologic: Assessment/Plan:    Active Hospital Problems    Diagnosis    Sepsis ([facility] [address] [A41.9]     Evolving sepsis- due to suspected OM of Left foot/ Fifth toe, with fever of 103, tachycardia, lactate wnl, nl bolus given in ER  -bcx pending  -started vanc/zosyn on 9/2, pharm assisted dosing  -ID consulted, apprec assistance on abx  -Podiatry consulted for further recs  -MRI w contrast of left foot pending  -ivfs ordered(100/hr x 12hrs), monitor. MRI showing left fifth toe early changes of OM, swelling. Blood cultures positive for Strep, ID and Pod input noted. Continue Zosyn and Zyvox as per ID.     S/p Surgery done on 9/4/2020-Procedure(s):  AMPUTATION FIFTH TOE, AND FIFTH METATARSAL HEAD OF LEFT FOOT WITH INCISION AND DEBRIDEMENT (PULSEVAC WITH ANTIBIOTIC, MAGDALENA PHILLIPS SAW)       DM2- uncontrolled  -a1c -9.2  Ac/hs bs  Med SSI  -on glipzide at home, continued     BPH- per emr, was on flomax and proscar but not taking(restart if needed)    DVT Prophylaxis: Lovenox  Diet: DIET CARB CONTROL;  Code Status: Full Code    PT/OT Eval Status: Not ordered    Dispo - in 1-2 days    Elaina Palacios MD

## 2020-09-05 NOTE — PROGRESS NOTES
Paged Dr. Jose J Schwartz;  9/5/20 4:39 PM   [phone] Hospital or Facility: [facility] From: [facility] Fito RE: Katie Bodily 1965 RM: 0 FYI pt said his glucose is not normally this high, I explained due to infection it can be elevated. Pt is on low sliding scale. Glucose was 170 with breakfast and 253 with lunch.  Please advise if you would like to increase sliding scale order, thank you

## 2020-09-05 NOTE — PROGRESS NOTES
Patient is awake, alert and oriented x4. Assessment is complete, see flow sheet. Bed in lowest position, wheels locked, call light is within reach. Patient denies any further needs at the moment. Will continue to monitor.   Vitals:    09/05/20 0853   BP: 128/73   Pulse: 65   Resp: 17   Temp: 98.4 °F (36.9 °C)   SpO2: 95%     Pain L foot not changing (norco not helping) - 6/10   On room air  Glucose 170

## 2020-09-05 NOTE — PROGRESS NOTES
End of shift report given to [facility] WINNIE CLAYTON RN. Patient alert and oriented. Bed in lowest position with wheels locked. Call light within reach.  No further needs at this time. [facility]

## 2020-09-05 NOTE — PLAN OF CARE
Problem: Pain:  Goal: Pain level will decrease  Description: Pain level will decrease  Outcome: Ongoing  Note: Patient's pain level controlled at this time. PRN percocet given. Will continue to monitor. Problem: Falls - Risk of:  Goal: Will remain free from falls  Description: Will remain free from falls  Outcome: Ongoing  Note: Pt high fall risk. Instructed to use call light before getting out of bed. Call light within reach. Bed in low position. Bed alarm on. Will continue to monitor. Problem: Serum Glucose Level - Abnormal:  Goal: Ability to maintain appropriate glucose levels will improve  Description: Ability to maintain appropriate glucose levels will improve  Outcome: Ongoing  Note: Blood glucose assessed prior to meals and monitored PRN. Humalog ordered and administered as needed. Pt on a carb control diet. Will continue to monitor. Problem: Skin Integrity:  Goal: Will show no infection signs and symptoms  Description: Will show no infection signs and symptoms  Outcome: Ongoing  Note: Patient's skin assessed. See flowsheet. Patient turned in bed. Pressure ulcer prevention in place. No issues with skin integrity this shift. Will continue to monitor.

## 2020-09-06 LAB
ANION GAP SERPL CALCULATED.3IONS-SCNC: 11 MMOL/L (ref 3–16)
BASOPHILS ABSOLUTE: 0.1 K/UL (ref 0–0.2)
BASOPHILS RELATIVE PERCENT: 0.9 %
BUN BLDV-MCNC: 11 MG/DL (ref 7–20)
CALCIUM SERPL-MCNC: 8.6 MG/DL (ref 8.3–10.6)
CHLORIDE BLD-SCNC: 98 MMOL/L (ref 99–110)
CO2: 26 MMOL/L (ref 21–32)
CREAT SERPL-MCNC: 0.9 MG/DL (ref 0.9–1.3)
EOSINOPHILS ABSOLUTE: 0.2 K/UL (ref 0–0.6)
EOSINOPHILS RELATIVE PERCENT: 2.5 %
GFR AFRICAN AMERICAN: >60
GFR NON-AFRICAN AMERICAN: >60
GLUCOSE BLD-MCNC: 120 MG/DL (ref 70–99)
GLUCOSE BLD-MCNC: 128 MG/DL (ref 70–99)
GLUCOSE BLD-MCNC: 160 MG/DL (ref 70–99)
GLUCOSE BLD-MCNC: 89 MG/DL (ref 70–99)
GLUCOSE BLD-MCNC: 98 MG/DL (ref 70–99)
GRAM STAIN RESULT: ABNORMAL
HCT VFR BLD CALC: 34.9 % (ref 40.5–52.5)
HEMOGLOBIN: 11.9 G/DL (ref 13.5–17.5)
LYMPHOCYTES ABSOLUTE: 2 K/UL (ref 1–5.1)
LYMPHOCYTES RELATIVE PERCENT: 24.5 %
MCH RBC QN AUTO: 29.9 PG (ref 26–34)
MCHC RBC AUTO-ENTMCNC: 34 G/DL (ref 31–36)
MCV RBC AUTO: 87.9 FL (ref 80–100)
MONOCYTES ABSOLUTE: 0.7 K/UL (ref 0–1.3)
MONOCYTES RELATIVE PERCENT: 8.3 %
NEUTROPHILS ABSOLUTE: 5.1 K/UL (ref 1.7–7.7)
NEUTROPHILS RELATIVE PERCENT: 63.8 %
ORGANISM: ABNORMAL
PDW BLD-RTO: 13.5 % (ref 12.4–15.4)
PERFORMED ON: ABNORMAL
PERFORMED ON: ABNORMAL
PERFORMED ON: NORMAL
PERFORMED ON: NORMAL
PLATELET # BLD: 267 K/UL (ref 135–450)
PMV BLD AUTO: 7.3 FL (ref 5–10.5)
POTASSIUM REFLEX MAGNESIUM: 4.4 MMOL/L (ref 3.5–5.1)
RBC # BLD: 3.97 M/UL (ref 4.2–5.9)
SODIUM BLD-SCNC: 135 MMOL/L (ref 136–145)
WBC # BLD: 8 K/UL (ref 4–11)
WOUND/ABSCESS: ABNORMAL

## 2020-09-06 PROCEDURE — 36415 COLL VENOUS BLD VENIPUNCTURE: CPT

## 2020-09-06 PROCEDURE — 97530 THERAPEUTIC ACTIVITIES: CPT

## 2020-09-06 PROCEDURE — 2580000003 HC RX 258: Performed by: PODIATRIST

## 2020-09-06 PROCEDURE — 1200000000 HC SEMI PRIVATE

## 2020-09-06 PROCEDURE — 97116 GAIT TRAINING THERAPY: CPT

## 2020-09-06 PROCEDURE — 6360000002 HC RX W HCPCS: Performed by: PODIATRIST

## 2020-09-06 PROCEDURE — 85025 COMPLETE CBC W/AUTO DIFF WBC: CPT

## 2020-09-06 PROCEDURE — 99233 SBSQ HOSP IP/OBS HIGH 50: CPT | Performed by: INTERNAL MEDICINE

## 2020-09-06 PROCEDURE — 6370000000 HC RX 637 (ALT 250 FOR IP): Performed by: PODIATRIST

## 2020-09-06 PROCEDURE — 2580000003 HC RX 258: Performed by: INTERNAL MEDICINE

## 2020-09-06 PROCEDURE — 6360000002 HC RX W HCPCS: Performed by: INTERNAL MEDICINE

## 2020-09-06 PROCEDURE — 97110 THERAPEUTIC EXERCISES: CPT

## 2020-09-06 PROCEDURE — 80048 BASIC METABOLIC PNL TOTAL CA: CPT

## 2020-09-06 RX ADMIN — INSULIN LISPRO 2 UNITS: 100 INJECTION, SOLUTION INTRAVENOUS; SUBCUTANEOUS at 09:35

## 2020-09-06 RX ADMIN — VANCOMYCIN HYDROCHLORIDE 1250 MG: 10 INJECTION, POWDER, LYOPHILIZED, FOR SOLUTION INTRAVENOUS at 13:11

## 2020-09-06 RX ADMIN — PIPERACILLIN AND TAZOBACTAM 3.38 G: 3; .375 INJECTION, POWDER, LYOPHILIZED, FOR SOLUTION INTRAVENOUS at 00:34

## 2020-09-06 RX ADMIN — GLIPIZIDE 15 MG: 5 TABLET ORAL at 05:55

## 2020-09-06 RX ADMIN — OXYCODONE HYDROCHLORIDE AND ACETAMINOPHEN 1 TABLET: 5; 325 TABLET ORAL at 19:52

## 2020-09-06 RX ADMIN — PIPERACILLIN AND TAZOBACTAM 3.38 G: 3; .375 INJECTION, POWDER, LYOPHILIZED, FOR SOLUTION INTRAVENOUS at 08:12

## 2020-09-06 RX ADMIN — ENOXAPARIN SODIUM 40 MG: 40 INJECTION SUBCUTANEOUS at 08:12

## 2020-09-06 RX ADMIN — Medication 10 ML: at 08:12

## 2020-09-06 RX ADMIN — Medication 10 ML: at 19:54

## 2020-09-06 RX ADMIN — LINEZOLID 600 MG: 600 INJECTION, SOLUTION INTRAVENOUS at 10:13

## 2020-09-06 ASSESSMENT — PAIN DESCRIPTION - FREQUENCY
FREQUENCY: CONTINUOUS
FREQUENCY: CONTINUOUS

## 2020-09-06 ASSESSMENT — PAIN DESCRIPTION - DESCRIPTORS
DESCRIPTORS: SHARP
DESCRIPTORS: ACHING;DISCOMFORT

## 2020-09-06 ASSESSMENT — PAIN DESCRIPTION - PAIN TYPE
TYPE: ACUTE PAIN;SURGICAL PAIN
TYPE: ACUTE PAIN;SURGICAL PAIN

## 2020-09-06 ASSESSMENT — PAIN SCALES - GENERAL
PAINLEVEL_OUTOF10: 4
PAINLEVEL_OUTOF10: 6
PAINLEVEL_OUTOF10: 4
PAINLEVEL_OUTOF10: 0
PAINLEVEL_OUTOF10: 6

## 2020-09-06 ASSESSMENT — PAIN DESCRIPTION - ONSET
ONSET: ON-GOING
ONSET: ON-GOING

## 2020-09-06 ASSESSMENT — PAIN DESCRIPTION - LOCATION
LOCATION: FOOT

## 2020-09-06 ASSESSMENT — PAIN DESCRIPTION - ORIENTATION
ORIENTATION: LEFT

## 2020-09-06 NOTE — PROGRESS NOTES
no DVT  NO copd     REVIEW OF SYSTEMS:    No fever / chills / sweats. No weight loss. No visual change, eye pain, eye discharge. No oral lesion, sore throat, dysphagia. Denies cough / sputum/Sob   Denies chest pain, palpitations/ dizziness  Denies nausea/ vomiting/abdominal pain/diarrhea. Denies dysuria or change in urinary function. Denies joint swelling or pain. No myalgia, arthralgia. No rashes, skin lesions   Denies focal weakness, sensory change or other neurologic symptoms  No lymph node swelling or tenderness.     Fevers, Left foot infection     PHYSICAL EXAM:      Vitals:  T max  103.2   /73   Pulse 65   Temp 98.6 °F (37 °C) (Oral)   Resp 16   Ht 5' 9\" (1.753 m)   Wt 233 lb 1 oz (105.7 kg)   SpO2 96%   BMI 34.42 kg/m²     General Appearance: alert,in no acute distress, no pallor, no icterus older than state age chronic ill appearing man   Skin: warm and dry, no rash or erythema  Head: normocephalic and atraumatic  Eyes: pupils equal, round, and reactive to light, conjunctivae normal  ENT: tympanic membrane, external ear and ear canal normal bilaterally, nose without deformity, nasal mucosa and turbinates normal without polyps  Neck: supple and non-tender without mass, no thyromegaly  no cervical lymphadenopathy  Pulmonary/Chest: clear to auscultation bilaterally- no wheezes, rales or rhonchi, normal air movement, no respiratory distress  Cardiovascular: normal rate, regular rhythm, normal S1 and S2, no murmurs, rubs, clicks, or gallops, no carotid bruits  Abdomen: soft, non-tender, non-distended, normal bowel sounds, no masses or organomegaly  Extremities: no cyanosis, clubbing or edema  Musculoskeletal: normal range of motion, no joint swelling, deformity or tenderness  Neurologic: reflexes normal and symmetric, no cranial nerve deficit  Psych:  Orientation, sensorium, mood normal  Lines: IV  Left foot post op dressing+     Data Review:    Lab Results   Component Value Date    WBC 8.0 09/06/2020    HGB 11.9 (L) 09/06/2020    HCT 34.9 (L) 09/06/2020    MCV 87.9 09/06/2020     09/06/2020     Lab Results   Component Value Date    CREATININE 0.9 09/06/2020    BUN 11 09/06/2020     (L) 09/06/2020    K 4.4 09/06/2020    CL 98 (L) 09/06/2020    CO2 26 09/06/2020       Hepatic Function Panel:   Lab Results   Component Value Date    ALKPHOS 112 09/02/2020    ALT 18 09/02/2020    AST 24 09/02/2020    PROT 7.7 09/02/2020    BILITOT 1.1 09/02/2020    LABALBU 4.0 09/02/2020       UA:  Lab Results   Component Value Date    COLORU Yellow 07/08/2019    CLARITYU Clear 07/08/2019    GLUCOSEU Negative 07/08/2019    BILIRUBINUR Negative 07/08/2019    KETUA Negative 07/08/2019    SPECGRAV <=1.005 07/08/2019    BLOODU SMALL 07/08/2019    PHUR 6.5 07/08/2019    PROTEINU Negative 07/08/2019    UROBILINOGEN 0.2 07/08/2019    NITRU Negative 07/08/2019    LEUKOCYTESUR Negative 07/08/2019    LABMICR YES 07/08/2019    URINETYPE Not Specified 07/08/2019      Urine Microscopic:   Lab Results   Component Value Date    WBCUA 0-2 07/08/2019    RBCUA 10-20 07/08/2019    EPIU 0-2 07/08/2019      Ref Range & Units  09/02/20 2001   Hemoglobin A1C  See comment %  9.2    Comment: Comment:   Diagnosis of Diabetes: > or = 6.5%   Increased risk of diabetes (Prediabetes): 5.7-6.4%   Glycemic Control: Nonpregnant Adults: <7.0%                     Pregnant: <6.0%        ESR 50       MICRO: cultures reviewed and updated by me          Culture, Surgical [[id number]]   Collected: 09/04/20 1428    Order Status: Completed  Specimen: Bone  Updated: 09/06/20 1123     Gram Stain Result  1+ WBC's (Polymorphonuclear)   No organisms seen     Culture Surgical  No growth to date     Anaerobic Culture  No anaerobes isolated so far, Further report to follow    Narrative:      ORDER#: [id number]                          ORDERED BY: SHERITA DOAN   SOURCE: Bone Foot Left  5th Metatarsal CleaCOLLECTED:  09/04/20 14:28   ANTIBIOTICS AT COLLEEN. :                      RECEIVED :  09/05/20 09:45   Performed at:   [facility]) [place] [address] [employer]   Phone [phone]    Culture, Surgical [[id number]]   Collected: 09/04/20 1423    Order Status: Completed  Specimen: Bone  Updated: 09/06/20 1122     Gram Stain Result  2+ WBC's (Polymorphonuclear)   4+ RBC's   No organisms seen     Culture Surgical  No growth to date     Anaerobic Culture  No anaerobes isolated so far, Further report to follow    Narrative:      ORDER#: [id number]                          ORDERED BY: SHERITA DOAN   SOURCE: Bone Foot Left  5th metatarsal     COLLECTED:  09/04/20 14:23   ANTIBIOTICS AT COLLEEN. :                      RECEIVED :  09/05/20 09:45   Performed at:   [place] [address] [employer]   Phone [phone]    Culture, Wound [[id number]]  (Abnormal)   Collected: 09/03/20 1847    Order Status: Completed  Specimen: Foot  Updated: 09/06/20 0734     Gram Stain Result  1+ Epithelial Cells   1+ WBC's (Polymorphonuclear)   1+ Gram positive cocci   Abnormal       Organism  Staph aureus MRSAAbnormal       WOUND/ABSCESS  --Abnormal       Heavy growth   CONTACT PRECAUTIONS INDICATED   PBP2= POSITIVE   Abnormal      Narrative:      ORDER#: [id number]                          ORDERED BY: SHERITA DOAN   SOURCE: Foot                               COLLECTED:  09/03/20 18:47   ANTIBIOTICS AT COLLEEN. :                      RECEIVED :  09/03/20 19:18   CALL Keaton Watson [phone],   Previous panic on this admission - call not needed per SOP, 09/05/2020 09:45,   by [facility]   Performed at:   [facility]   [address] [facility] [place], [place] [facility] [address]   Phone [phone]    Culture, Blood 1 [[id number]]   Collected: 09/04/20 1209    Order Status: Completed  Specimen: Blood  Updated: 09/05/20 1315     Blood Culture, Routine  No Growth to date.  Any change in status will be called. Narrative:      ORDER#: [id number]                          ORDERED BY: Carroll Aguirre   SOURCE: Blood                              COLLECTED:  09/04/20 12:09   ANTIBIOTICS AT COLLEEN. :                      RECEIVED :  09/04/20 15:25   If child <=2 yrs old please draw pediatric bottle. ~Blood Culture #1   Performed at:   [facility]   [address], [place] [address]   Phone [phone]    Culture, Blood 2 [[id number]]   Collected: 09/04/20 1213    Order Status: Completed  Specimen: Blood  Updated: 09/05/20 1315     Culture, Blood 2  No Growth to date.  Any change in status will be called. Narrative:      ORDER#: [id number]                          ORDERED BY: Carroll Aguirre   SOURCE: Blood                              COLLECTED:  09/04/20 12:13   ANTIBIOTICS AT COLLEEN. :                      RECEIVED :  09/04/20 15:25   If child <=2 yrs old please draw pediatric bottle. ~Blood Culture #2   Performed at:   [facility]   [address] [facility] [employer] [address]   Phone [phone]    Culture, Blood 1 [[id number]]  (Abnormal)  Collected: 09/02/20 1515    Order Status: Completed  Specimen: Blood  Updated: 09/05/20 0934     Organism  BHS Group B (Strep agalacticae)Abnormal       Blood Culture, Routine  --     POSITIVE for    Isolated two of two sets   Susceptibility testing of penicillin and other beta lactams is   not necessary for beta hemolytic Streptococci since resistant   strains have not been identified.  (CLSI M100)     Organism  Staph aureus MRSAAbnormal       Blood Culture, Routine  --Abnormal       POSITIVE for   No further workup   Refer to culture [id number] for sensitivity results   Isolated two of two sets   CONTACT PRECAUTIONS INDICATED   Abnormal      Narrative:      ORDER#: [id number]                          ORDERED BY: MITCHEL POTTER   SOURCE: Blood Antecubital-Lef              COLLECTED:  09/02/20 15:15 ANTIBIOTICS AT COLLEEN. :                      RECEIVED :  09/03/20 04:09   CALL  [place]  SAA2 tel. [phone],   Previous panic on this admission - call not needed per SOP, 09/03/2020 22:07,   by [facility]   If child <=2 yrs old please draw pediatric bottle. ~Blood Culture #1   Performed at:   [place] [address] [address] [facility] [place] [address]   Phone [phone]    Culture, Blood 2 [[id number]]  (Abnormal)   Collected: 09/02/20 1600    Order Status: Completed  Specimen: Blood  Updated: 09/05/20 0930     Organism  Streptococcus agalactiae (BHS Gr B) DNA DetectedAbnormal       Culture, Blood 2  --     See additional report for complete BCID panel.    Isolated two of two sets   Susceptibility testing of penicillin and other beta lactams is   not necessary for beta hemolytic Streptococci since resistant   strains have not been identified. (CLSI M100)     Organism  BHS Group B (Strep agalacticae)Abnormal       Culture, Blood 2  --     POSITIVE for   No further workup   Susceptibility testing of penicillin and other beta lactams is   not necessary for beta hemolytic Streptococci since resistant   strains have not been identified. (CLSI M100)     Organism  Staph aureus MRSAAbnormal       Culture, Blood 2  --Abnormal       POSITIVE for    Isolated two of two sets   CONTACT PRECAUTIONS INDICATED   Abnormal      Narrative:      ORDER#: [id number]                          ORDERED BY: Jeff Coleman   SOURCE: Blood Hand, Right                  COLLECTED:  09/02/20 16:00   ANTIBIOTICS AT COLLEEN. :                      RECEIVED :  09/03/20 04:09   CALL Milagros Cortez [phone],   Microbiology results called to and read back by [facility],   09/03/2020 13:23, by [facility]   Microbiology results called to and read back by Cara Patton RN, 09/03/2020   13:22, by [facility]   If child <=2 yrs old please draw pediatric bottle. ~Blood Culture #2   Performed at:   [facility] [address] [facility], [place], [place] [employer] [address]   Phone [phone]    Culture, Blood, PCR ID Panel Results Report [[id number]]   Collected: 09/02/20 1600    Order Status: Completed  Updated: 09/03/20 1323     Report  SEE IMAGE    Narrative:      CALL Dimitri Guillen [phone],   Microbiology results called to and read back by Nestor Longoria,   09/03/2020 13:23, by [facility]   Microbiology results called to and read back by Shorty Storm RN, 09/03/2020   13:22, by [facility]   Referred out by:   [facility]   [address] [facility], [place] [employer] [address]   Phone [phone]    Culture, Blood 2 [[id number]]      Order Status: Canceled  Specimen: Blood     Culture, Blood 1 [[id number]]   Collected: 09/02/20 0000    Order Status: Canceled  Specimen: Blood                Culture, Blood 1 [[id number]]  (Abnormal)  Collected: 09/02/20 1515    Order Status: Completed  Specimen: Blood  Updated: 09/03/20 2207     Blood Culture, Routine  --Abnormal       Gram stain Aerobic bottle:   Gram positive cocci in chains and/or pairs   resembling Streptococcus   Information to follow   Abnormal      Narrative:      ORDER#: [id number]                          ORDERED BY: MITCHEL POTTER   SOURCE: Blood Antecubital-Lef              COLLECTED:  09/02/20 15:15   ANTIBIOTICS AT COLLEEN. :                      RECEIVED :  09/03/20 04:09   CALL  [place]  SAA2 tel. [phone],   Previous panic on this admission - call not needed per SOP, 09/03/2020 22:07,   by [facility]   If child <=2 yrs old please draw pediatric bottle. ~Blood Culture #1   Performed at:   [facility]   [address] [facility], [place] [employer] [address]   Phone [phone]    Culture, Wound [[id number]]   Collected: 09/03/20 1847    Order Status: Sent  Specimen: Foot  Updated: 09/03/20 2046     Gram Stain Result  1+ Epithelial Cells   1+ WBC's (Polymorphonuclear)   1+ Gram positive cocci    Narrative:      ORDER#: [id number]                          ORDERED BY: SHERITA DOAN   SOURCE: Foot                               COLLECTED:  09/03/20 18:47   ANTIBIOTICS AT COLLEEN. :                      RECEIVED :  09/03/20 19:18   Performed at:   [place] [address] [address] [facility] [address] [facility] [address] [address]   Phone [phone]    Culture, Blood, PCR ID Panel Results Report [[id number]]   Collected: 09/02/20 1600    Order Status: Completed  Updated: 09/03/20 1323     Report  SEE IMAGE    Narrative:      CALL Torie Carver [phone],   Microbiology results called to and read back by Teofilo Cowden,   09/03/2020 13:23, by [facility]   Microbiology results called to and read back by Holley Munoz RN, 09/03/2020   13:22, by [facility]   Referred out by:   [address] [place], [place] [address]   Phone [phone]    Culture, Blood 2 [[id number]]  (Abnormal)  Collected: 09/02/20 1600    Order Status: Completed  Specimen: Blood  Updated: 09/03/20 1319     Culture, Blood 2  --Abnormal       Gram stain Aerobic bottle:   Gram positive cocci in chains and/or pairs   resembling Streptococcus   Information to follow   Abnormal       Organism  Streptococcus agalactiae (S Gr B) DNA DetectedAbnormal       Culture, Blood 2  --     See additional report for complete BCID panel. Susceptibility testing of penicillin and other beta lactams is   not necessary for beta hemolytic Streptococci since resistant   strains have not been identified. (CLSI M100)    Narrative:      ORDER#: [id number]                          ORDERED BY: Raquel Hernandez   SOURCE: Blood Hand, Right                  COLLECTED:  09/02/20 16:00   ANTIBIOTICS AT COLLEEN. :                      RECEIVED :  09/03/20 04:09   If child <=2 yrs old please draw pediatric bottle. ~Blood Culture #2   Performed at:   [address] [place], [place] [employer] [facility] [address] [address]   Phone [phone]    Culture, Blood 2 [[id number]]      Order Status: Canceled  Specimen: Blood     Culture, Blood 1 [[id number]]   Collected: 09/02/20 0000    Order Status: Canceled  Specimen: Blood              Culture, Surgical [[id number]]  (Abnormal)  Collected: 09/04/20 1423    Order Status: Completed  Specimen: Bone  Updated: 09/06/20 1445     Gram Stain Result  2+ WBC's (Polymorphonuclear)   4+ RBC's   No organisms seen     Anaerobic Culture  No anaerobes isolated so far, Further report to follow     Organism  Staphylococcus speciesAbnormal       Culture Surgical  Rare growth    Narrative:      ORDER#: [id number]                          ORDERED BY: SHERITA DOAN   SOURCE: Bone Foot Left  5th metatarsal     COLLECTED:  09/04/20 14:23   ANTIBIOTICS AT COLLEEN. :                      RECEIVED :  09/05/20 09:45   Performed at:   [address], [address] [place] [address], [facility]   Phone [phone]    Culture, Surgical [[id number]]  (Abnormal)  Collected: 09/04/20 1428    Order Status: Completed  Specimen: Bone  Updated: 09/06/20 1431     Gram Stain Result  1+ WBC's (Polymorphonuclear)   No organisms seen     Anaerobic Culture  No anaerobes isolated so far, Further report to follow     Organism  Pseudomonas aeruginosaAbnormal       Culture Surgical  --     Rare growth   ID and sensitivity to follow    Narrative:      ORDER#: [id number]                          ORDERED BY: SHERITA DOAN   SOURCE: Bone Foot Left  5th Metatarsal CleaCOLLECTED:  09/04/20 14:28   ANTIBIOTICS AT COLLEEN. :                      RECEIVED :  09/05/20 09:45   Performed at:   [address], [address] [place] [address], [address]   Phone [phone]    Culture, Wound [[id number]]  (Abnormal)   Collected: 09/03/20 1847    Order Status: Completed  Specimen: Foot  Updated: 09/06/20 0734     Gram Stain Result  1+ Epithelial Cells   1+ WBC's (Polymorphonuclear)   1+ Gram positive cocci   Abnormal       Organism Staph aureus MRSAAbnormal       WOUND/ABSCESS  --Abnormal       Heavy growth   CONTACT PRECAUTIONS INDICATED   PBP2= POSITIVE   Abnormal       Susceptibility     Staph aureus mrsa (1)     Antibiotic  Interpretation  HUMERA  Status     clindamycin  Sensitive  <=0.25  mcg/mL      erythromycin  Resistant  >=8  mcg/mL      oxacillin  Resistant  >=4  mcg/mL      tetracycline  Resistant  >=16  mcg/mL      trimethoprim-sulfamethoxazole  Sensitive  <=10  mcg/mL      vancomycin  Sensitive  1  mcg/mL            IMAGING:    MRI FOOT LEFT W WO CONTRAST   Final Result   Diffuse soft tissue edema which is most prominent dorsally. Soft tissue   ulceration along the plantar lateral aspect of the distal foot. There is subtle mild increased T2 signal noted within the 5th metatarsal head   and the 5th digit. No corresponding low T1 signal changes. Findings may be   reactive however concern for early osteomyelitis given adjacent ulceration. XR FOOT LEFT (MIN 3 VIEWS)   Final Result   Suspected bone destruction such as osteomyelitis involving the base of the   1st distal phalanx and lesser degree medial aspect of the adjacent proximal   phalanx across the joint space. This is new from the prior study. Osteomyelitis primarily considered particularly given the history. Other findings above. XR CHEST PORTABLE   Final Result   Unremarkable portable chest radiograph.                All the pertinent images and reports for the current Hospitalization were reviewed by me     Scheduled Meds:   linezolid  600 mg Intravenous Q12H    glipiZIDE  15 mg Oral QAM AC    piperacillin-tazobactam  3.375 g Intravenous Q8H    sodium chloride flush  10 mL Intravenous 2 times per day    enoxaparin  40 mg Subcutaneous Daily    insulin lispro  0-12 Units Subcutaneous TID WC    insulin lispro  0-6 Units Subcutaneous Nightly       Continuous Infusions:   dextrose         PRN Meds:  oxyCODONE-acetaminophen, glucose, dextrose, glucagon (rDNA), dextrose, sodium chloride flush, acetaminophen **OR** acetaminophen, polyethylene glycol, promethazine **OR** ondansetron      Assessment:     Patient Active Problem List   Diagnosis    Osteomyelitis ([facility]    Right foot infection    [facility] with [facility]    Type 2 diabetes mellitus ([facility]    Sepsis ([facility]    Uncontrolled type 2 diabetes mellitus with hyperglycemia (HCC)    High fever    Bandemia    Closed displaced fracture of fifth metatarsal bone of right foot    Elevated sed rate    Elevated C-reactive protein (CRP)    Overweight    Failure of outpatient treatment    Diabetic polyneuropathy associated with type 2 diabetes mellitus ([facility]    Receiving intravenous antibiotic treatment as outpatient    Weight loss counseling, encounter for     Sepsis  High fevers  Left complicated diabetic foot infection  Group B strep bacteremia  Left 5 th toe osteomyelitis  DM poor control  Diabetic Neuropathy  MRSA bacteremia     Fevers from high grade bacteremia and Left foot infection s/p surgery and OR cx MRSA AND Pseudomonas     It appears that he has Polymicrobial infection given MRSA bacteremia and Group B strep will adjust IV abx     Labs, Microbiology, Radiology and all the pertinent results from current hospitalization and  care every where were reviewed  by me as a part of the evaluation   Plan:   1. Change to IV Cefepime to cover Pseudomonas   2. Repeat blood cx sent   3. OR cx MRSA and Pseudomonas   4. Blood cx MRSA noted  5. Change to IV Vancomycin for out patient planning it would be easy  6. Will need PICC LINE and Home IV abx at d/c   7. Anticipate he will stay here through Monday as we need to clear the Blood stream and get Home IV abx set up   8. TTE to check for vegetations  9.  Anticipate 4 -6 weeks of abx therapy       Discussed with patient/Family and Nursing staff   Risk of Complications/Morbidity: High      · Illness(es)/ Infection present that pose threat

## 2020-09-06 NOTE — PROGRESS NOTES
Physical Therapy  Facility/Department: [facility] A2 CARD TELEMETRY  Daily Treatment Note  NAME: Audi Frederick  : 1965  MRN: [id number]    Date of Service: 2020    Discharge Recommendations:  Home with Home health PT, 24 hour supervision or assist   PT Equipment Recommendations  Equipment Needed: Yes  Mobility Devices: Mitesh Page: Rolling    Assessment   Body structures, Functions, Activity limitations: Decreased functional mobility ; Decreased sensation; Increased pain;Decreased ADL status; Decreased balance;Decreased strength;Decreased high-level IADLs;Decreased ROM; Decreased endurance  Assessment: Pt seen in am for PT tx, pleasant and agreeable reports L foot pain 4/10 with RN aware. Pt demonstrates good participation throughout session demonstrating bed mobility with MI, t/f with RW and CGA and ambulates up to 30' with RW with CGA. Pt is mildly unsteady with gait, no overt LOB. Pt also completes curb step with AD to simulate home setup and completes with Ursula to [address]. Recommend continued practice of stair activities prior to d/c as pt has 15 WIL[address]. Pt is able to maintain LLE NWB with minimal to no cues with functional mobility and gait. Discussed recommendation for going up/down stairs on buttocks and standing up and hopping up last 1-2 steps with assist and ambulating to apartment with AD, pt reports previously he scooted up steps on buttocks then crawled to apartment. Pt is limited during session d/t decreased activity tolerance and decreased strength. Pt will benefit from continued skilled PT in acute care setting to address above deficits. Pt reports he does not have RW/SW at home recommend RW for safe gait activities within the home. Pt needs further training with stair activities prior to d/c home. Treatment Diagnosis: decreased independence with functional mobility. Specific instructions for Next Treatment: progress mobility as tolerated.  Further stair training to simulate home enviornment  Prognosis: Good  Decision Making: Medium Complexity  PT Education: Goals; General Safety;Gait Training;PT Role;Disease Specific Education;Plan of Care; Functional Mobility Training;Home Exercise Program;Precautions;Transfer Training;Weight-bearing Education; Injury Prevention;Pressure Relief  Patient Education: Pt educated on techniques for stair activities and recommendation of x1 person assist with gait and stairs, reviewed NWB and maintenance with functional mobility, pt verbalized understanding  Barriers to Learning: none  REQUIRES PT FOLLOW UP: Yes  Activity Tolerance  Activity Tolerance: Patient limited by endurance; Patient limited by fatigue     Patient Diagnosis(es): The primary encounter diagnosis was Osteomyelitis of left foot, unspecified type ([facility] [address] [facility] A diagnosis of Septicemia ([facility] was also pertinent to this visit. has a past medical history of Diabetes mellitus ([facility] Foot ulcer ([facility] and MRSA (methicillin resistant staph aureus) culture positive. has a past surgical history that includes orthopedic surgery; Ankle surgery; Toe amputation (Right, 7/8/2019); and Foot Debridement (Right, 7/11/2019). Restrictions  Restrictions/Precautions  Restrictions/Precautions: Weight Bearing, General Precautions, Fall Risk, Contact Precautions(MRSA)  Lower Extremity Weight Bearing Restrictions  Left Lower Extremity Weight Bearing: Non Weight Bearing  Position Activity Restriction  Other position/activity restrictions: Telemetry, up with assist  Subjective   General  Chart Reviewed: Yes  Additional Pertinent Hx: CC osteomyelitis. 9/4/20 amputation of left 5th toe and 5th metatarsal head.   Family / Caregiver Present: No  Referring Practitioner: Shiva Thomas DPM  Subjective  Subjective: Pt supine in bed on approach, pleasant and agreeable to PT tx  General Comment  Comments: RN cleared pt for session  Pain Screening  Patient Currently in Pain: Yes  Pain Assessment  Pain Assessment: 0-10  Pain Level: 4  Pain Type: Acute pain;Surgical pain  Pain Location: Foot  Pain Orientation: Left  Vital Signs  Patient Currently in Pain: Yes       Orientation  Orientation  Overall Orientation Status: Within Functional Limits  Cognition   Cognition  Overall Cognitive Status: WNL     Objective   Bed mobility  Supine to Sit: Modified independent(To L, HOB elevated, use of BR)  Sit to Supine: Unable to assess(Pt seated EOB at end of session)     Transfers  Sit to Stand: Contact guard assistance  Stand to sit: Contact guard assistance  Bed to Chair: Contact guard assistance  Stand Pivot Transfers: Contact guard assistance  Comment: EOB to RW completed x 2 trials, standard chair to RW, SPT w/c to EOB with RW. CGA with cues for hand placement. Pt maintains LLE NWB with minimal to no cues     Ambulation  Ambulation?: Yes  WB Status: NWB LLE  Ambulation 1  Surface: level tile  Device: Rolling Walker  Assistance: Contact guard assistance;Minimal assistance  Quality of Gait: Hop to gait pattern with decreased foot clearance and increased UE support, slight forward flexed trunk. cues provided for safety. Pt mildly unsteady no overt LOB  Distance: 30' x 2  Comments: Distances limite secondary to fatigue  Stairs/Curb  Stairs?: Yes  Stairs  Curbs: 6\"  Device: Standard walker(1+1 6\" curb step with SW to simulate BHR)  Assistance: Minimal assistance; Moderate assistance  Comment: Visual demo and cues for sequence, Ursula on first attempt, Rockney Gilford on second with slight anterior LOB with cues for safety. Balance  Posture: Good  Sitting - Static: Good  Sitting - Dynamic: Good  Standing - Static: Fair;+  Standing - Dynamic: Fair  Comments: With RW     Exercises  Straight Leg Raise: Supine BLE x 15  Quad Sets: Supine BLE x 15  Heelslides: Supine BLE x 15  Gluteal Sets: Supine BLE x 15  Hip Abduction: Supine BLE x 15  Knee Short Arc Quad: Supine BLE x 15  Ankle Pumps: Supine BLE x 15  Comments: cueing for sequencing and technique. AM-PAC Score  AM-PAC Inpatient Mobility Raw Score : 19 (09/06/20 0935)  AM-PAC Inpatient T-Scale Score : 45.44 (09/06/20 0935)  Mobility Inpatient CMS 0-100% Score: 41.77 (09/06/20 0935)  Mobility Inpatient CMS G-Code Modifier : CK (09/06/20 0935)          Goals  Short term goals  Time Frame for Short term goals: 1 week 9/11/20  Short term goal 1: Sit <> stand with LRAD and mod I. -- 9/06: CGA with RW  Short term goal 2: Bed <> chair with LRAD and mod I. -- 9/06: CGA with RW  Short term goal 3: Ambulate 50 feet with LRAD and mod I. -- 9/06: CGA with RW x 30'  Short term goal 4: Ascend 4 steps (via bumping up method) with supervision. -- 9/06: Ursula/modA curb step with SW  Short term goal 5: Patient will tolerate 10-15 reps of his exercises and be independent with his home exercise program. -- 9/06: x 15 reps BLE exercises  Patient Goals   Patient goals : To feel better. To be stronger. To go home. Plan    Plan  Times per week: 3-5/week  Times per day: Daily  Plan weeks: 1 week 9/11/20  Specific instructions for Next Treatment: progress mobility as tolerated. Further stair training to simulate home enviornment  Current Treatment Recommendations: Strengthening, Balance Training, Endurance Training, Patient/Caregiver Education & Training, Safety Education & Training, Functional Mobility Training, Equipment Evaluation, Education, & procurement, Gait Training, Transfer Training, ADL/Self-care Training, Stair training, Pain Management, Home Exercise Program, ROM  Safety Devices  Type of devices: All fall risk precautions in place, Nurse notified, Gait belt, Left in bed, Call light within reach, Bed alarm in place  Restraints  Initially in place: No     Therapy Time   Individual Concurrent Group Co-treatment   Time In 0811         Time Out 0856         Minutes 45         Timed Code Treatment Minutes: 45 Minutes     If pt is unable to be seen after this session, please let this note serve as discharge summary.

## 2020-09-06 NOTE — PLAN OF CARE
Problem: Pain:  Description: Pain management should include both nonpharmacologic and pharmacologic interventions.   Goal: Pain level will decrease  Description: Pain level will decrease  9/6/2020 1617 by Newman Jeans, RN  Outcome: Ongoing  Note: Pt resting in bed comfortably with zero signs of distress

## 2020-09-06 NOTE — PROGRESS NOTES
Department of Podiatric  Surgery  Dr. Aparna Hansen  Progress Note      SUBJECTIVE  POD #2 - pain improving with percocet/ . Patient denies any new complaints. OBJECTIVE    Physical    VITALS:  BP (!) 150/74   Pulse 73   Temp 98.5 °F (36.9 °C) (Axillary)   Resp 16   Ht 5' 9\" (1.753 m)   Wt 233 lb 1 oz (105.7 kg)   SpO2 93%   BMI 34.42 kg/m²   No drainage on bandage. CFT less than 3 seconds to al digits. Data    CBC with Differential:    Lab Results   Component Value Date    WBC 8.6 09/04/2020    RBC 4.06 09/04/2020    HGB 12.1 09/04/2020    HCT 36.3 09/04/2020     09/04/2020    MCV 89.4 09/04/2020    MCH 29.8 09/04/2020    MCHC 33.3 09/04/2020    RDW 13.3 09/04/2020    BANDSPCT 1 07/15/2019    METASPCT 2 07/14/2019    LYMPHOPCT 21.8 09/04/2020    MONOPCT 14.1 09/04/2020    EOSPCT 2 07/22/2019    BASOPCT 0.7 09/04/2020    MONOSABS 1.2 09/04/2020    LYMPHSABS 1.9 09/04/2020    EOSABS 0.2 09/04/2020    BASOSABS 0.1 09/04/2020     CMP:    Lab Results   Component Value Date     09/05/2020    K 4.4 09/05/2020     09/05/2020    CO2 25 09/05/2020    BUN 14 09/05/2020    CREATININE 0.8 09/05/2020    GFRAA >60 09/05/2020    AGRATIO 1.1 09/02/2020    LABGLOM >60 09/05/2020    GLUCOSE 236 09/05/2020    PROT 7.7 09/02/2020    LABALBU 4.0 09/02/2020    CALCIUM 8.2 09/05/2020    BILITOT 1.1 09/02/2020    ALKPHOS 112 09/02/2020    AST 24 09/02/2020    ALT 18 09/02/2020   Surgical culture: no growth to date  Wound Culture:    BHS Group B (Strep agalacticae)Abnormal            Staph aureus MRSA    WOUND/ABSCESS Abnormal    09/03/2020  6:47 PM   - [facility]    Heavy growth   CONTACT PRECAUTIONS INDICATED   PBP2= POSITIVE    Testing Performed By     Lab - Abbreviation  Name  Director  Address  Valid Date Range    19- - Jen Paige M.D., Ph.D.  [address] [place] [address].    [facility] [address]  08/30/17 0817-Present    25-- [place] [address] [facility]  Reba Kendra Cantu M.D.  [address] [facility] [address]  08/30/17 0807-Present    Narrative   Performed by: [facility]   ORDER#: [id number]                          ORDERED BY: Miki Stuart   SOURCE: Foot                               COLLECTED:  09/03/20 18:47   ANTIBIOTICS AT COLLEEN. :                      RECEIVED :  09/03/20 19:18   CALL Elliott Bessy [phone],   Previous panic on this admission - call not needed per SOP, 09/05/2020 09:45,   by [facility]   Performed at:   [address] [place], [place] [address]   Phone [phone]    Susceptibility     Staph aureus mrsa (1)     Antibiotic  Interpretation  HUMERA  Status    clindamycin  Sensitive  <=0.25  mcg/mL     erythromycin  Resistant  >=8  mcg/mL     oxacillin  Resistant  >=4  mcg/mL     tetracycline  Resistant  >=16  mcg/mL     trimethoprim-sulfamethoxazole  Sensitive  <=10  mcg/mL     vancomycin  Sensitive  1  mcg/mL         Current Inpatient Medications    Current Facility-Administered Medications: oxyCODONE-acetaminophen (PERCOCET) 5-325 MG per tablet 1 tablet, 1 tablet, Oral, Q4H PRN  linezolid (ZYVOX) IVPB 600 mg, 600 mg, Intravenous, Q12H  glucose (GLUTOSE) 40 % oral gel 15 g, 15 g, Oral, PRN  dextrose 50 % IV solution, 12.5 g, Intravenous, PRN  glucagon (rDNA) injection 1 mg, 1 mg, Intramuscular, PRN  dextrose 5 % solution, 100 mL/hr, Intravenous, PRN  glipiZIDE (GLUCOTROL) tablet 15 mg, 15 mg, Oral, QAM AC  piperacillin-tazobactam (ZOSYN) 3.375 g in dextrose 5 % 50 mL IVPB extended infusion (mini-bag), 3.375 g, Intravenous, Q8H  sodium chloride flush 0.9 % injection 10 mL, 10 mL, Intravenous, 2 times per day  sodium chloride flush 0.9 % injection 10 mL, 10 mL, Intravenous, PRN  acetaminophen (TYLENOL) tablet 650 mg, 650 mg, Oral, Q6H PRN **OR** acetaminophen (TYLENOL) suppository 650 mg, 650 mg, Rectal, Q6H PRN  polyethylene glycol (GLYCOLAX) packet 17 g, 17 g, Oral, Daily PRN  promethazine (PHENERGAN) tablet 12.5 mg, 12.5 mg, Oral, Q6H PRN **OR** ondansetron (ZOFRAN) injection 4 mg, 4 mg, Intravenous, Q6H PRN  enoxaparin (LOVENOX) injection 40 mg, 40 mg, Subcutaneous, Daily  insulin lispro (HUMALOG) injection vial 0-12 Units, 0-12 Units, Subcutaneous, TID WC  insulin lispro (HUMALOG) injection vial 0-6 Units, 0-6 Units, Subcutaneous, Nightly    ASSESSMENT AND PLAN    Cellulitis with osteomyelitis left 5th toe and met head - POD #1 amputation 5th toe and met head -  + MRSA and Strep Group B- Non weight bearing to left foot. Likely okay for Oral Abx - felt definitive in amputation but also sent clearance fragment of bone to help determine length of abx coverage. Okay for DC once ID and other services signs off. Pain - will DC norco and change to PO percocet and if tolerates can go home on this.

## 2020-09-06 NOTE — PROGRESS NOTES
Hospitalist Progress Note      PCP: Sharyn Cabello MD, MD    Date of Admission: 9/2/2020    Chief Complaint: Fever, foot pain    Hospital Course: See H&P     Subjective:   Patient is up in bed, comfortable. Not in distress. No new event overnight. Medications:  Reviewed    Infusion Medications    dextrose       Scheduled Medications    linezolid  600 mg Intravenous Q12H    glipiZIDE  15 mg Oral QAM AC    piperacillin-tazobactam  3.375 g Intravenous Q8H    sodium chloride flush  10 mL Intravenous 2 times per day    enoxaparin  40 mg Subcutaneous Daily    insulin lispro  0-12 Units Subcutaneous TID WC    insulin lispro  0-6 Units Subcutaneous Nightly     PRN Meds: oxyCODONE-acetaminophen, glucose, dextrose, glucagon (rDNA), dextrose, sodium chloride flush, acetaminophen **OR** acetaminophen, polyethylene glycol, promethazine **OR** ondansetron      Intake/Output Summary (Last 24 hours) at 9/6/2020 0949  Last data filed at 9/6/2020 0336  Gross per 24 hour   Intake 600 ml   Output 1940 ml   Net -1340 ml       Physical Exam Performed:    /73   Pulse 65   Temp 98.6 °F (37 °C) (Oral)   Resp 16   Ht 5' 9\" (1.753 m)   Wt 233 lb 1 oz (105.7 kg)   SpO2 96%   BMI 34.42 kg/m²     General appearance: No apparent distress, appears stated age and cooperative. HEENT: Pupils equal, round, and reactive to light. Conjunctivae/corneas clear. Neck: Supple, with full range of motion. No jugular venous distention. Trachea midline. Respiratory:  Normal respiratory effort. Clear to auscultation, bilaterally without Rales/Wheezes/Rhonchi. Cardiovascular: Regular rate and rhythm with normal S1/S2 without murmurs, rubs or gallops. Abdomen: Soft, non-tender, non-distended with normal bowel sounds. Musculoskeletal: No clubbing, cyanosis or edema bilaterally. Full range of motion without deformity. Skin: Skin color, texture, turgor normal.  No rashes or lesions.   Neurologic:  Neurovascularly intact without any focal sensory/motor deficits. Cranial nerves: II-XII intact, grossly non-focal.  Psychiatric: Alert and oriented, thought content appropriate, normal insight  Capillary Refill: Brisk,< 3 seconds   Peripheral Pulses: +2 palpable, equal bilaterally       Labs:   Recent Labs     09/04/20  0741   WBC 8.6   HGB 12.1*   HCT 36.3*        Recent Labs     09/04/20  0741 09/05/20  0932    137   K 3.6 4.4    102   CO2 23 25   BUN 14 14   CREATININE 0.9 0.8*   CALCIUM 8.6 8.2*     No results for input(s): AST, ALT, BILIDIR, BILITOT, ALKPHOS in the last 72 hours. No results for input(s): INR in the last 72 hours. No results for input(s): Davon Service in the last 72 hours. Urinalysis:      Lab Results   Component Value Date    NITRU Negative 07/08/2019    WBCUA 0-2 07/08/2019    RBCUA 10-20 07/08/2019    BLOODU SMALL 07/08/2019    SPECGRAV <=1.005 07/08/2019    GLUCOSEU Negative 07/08/2019       Radiology:  MRI FOOT LEFT W WO CONTRAST   Final Result   Diffuse soft tissue edema which is most prominent dorsally. Soft tissue   ulceration along the plantar lateral aspect of the distal foot. There is subtle mild increased T2 signal noted within the 5th metatarsal head   and the 5th digit. No corresponding low T1 signal changes. Findings may be   reactive however concern for early osteomyelitis given adjacent ulceration. XR FOOT LEFT (MIN 3 VIEWS)   Final Result   Suspected bone destruction such as osteomyelitis involving the base of the   1st distal phalanx and lesser degree medial aspect of the adjacent proximal   phalanx across the joint space. This is new from the prior study. Osteomyelitis primarily considered particularly given the history. Other findings above. XR CHEST PORTABLE   Final Result   Unremarkable portable chest radiograph.                  Assessment/Plan:    Active Hospital Problems    Diagnosis    Sepsis ([facility] [A41.9]     Evolving sepsis- due to suspected OM of Left foot/ Fifth toe, with fever of 103, tachycardia, lactate wnl, nl bolus given in ER  -bcx pending  -started vanc/zosyn on 9/2, pharm assisted dosing  -ID consulted, apprec assistance on abx  -Podiatry consulted for further recs  -ivfs ordered(100/hr x 12hrs), monitor. MRI showing left fifth toe early changes of OM, swelling. Blood cultures positive for Strep, ID and Pod input noted. Continue Zosyn and Zyvox as per ID.     S/p Surgery done on 9/4/2020-Procedure(s):  AMPUTATION FIFTH TOE, AND FIFTH METATARSAL HEAD OF LEFT FOOT WITH INCISION AND DEBRIDEMENT (PULSEVAC WITH ANTIBIOTIC, MILAS JACQUELINE SAW)       DM2- uncontrolled  -a1c -9.2  Ac/hs bs  Med SSI  -on glipzide at home, continued     BPH- per emr, was on flomax and proscar but not taking(restart if needed)    DVT Prophylaxis: Lovenox  Diet: DIET CARB CONTROL;  Code Status: Full Code    PT/OT Eval Status: Not ordered    Dispo - in 1-2 days    Margi Ivory MD

## 2020-09-07 LAB
ANION GAP SERPL CALCULATED.3IONS-SCNC: 9 MMOL/L (ref 3–16)
BUN BLDV-MCNC: 12 MG/DL (ref 7–20)
CALCIUM SERPL-MCNC: 9 MG/DL (ref 8.3–10.6)
CHLORIDE BLD-SCNC: 104 MMOL/L (ref 99–110)
CO2: 26 MMOL/L (ref 21–32)
CREAT SERPL-MCNC: 0.9 MG/DL (ref 0.9–1.3)
GFR AFRICAN AMERICAN: >60
GFR NON-AFRICAN AMERICAN: >60
GLUCOSE BLD-MCNC: 105 MG/DL (ref 70–99)
GLUCOSE BLD-MCNC: 114 MG/DL (ref 70–99)
GLUCOSE BLD-MCNC: 117 MG/DL (ref 70–99)
GLUCOSE BLD-MCNC: 172 MG/DL (ref 70–99)
GLUCOSE BLD-MCNC: 77 MG/DL (ref 70–99)
PERFORMED ON: ABNORMAL
PERFORMED ON: NORMAL
POTASSIUM REFLEX MAGNESIUM: 4.8 MMOL/L (ref 3.5–5.1)
SODIUM BLD-SCNC: 139 MMOL/L (ref 136–145)
VANCOMYCIN TROUGH: 14.4 UG/ML (ref 10–20)

## 2020-09-07 PROCEDURE — 80048 BASIC METABOLIC PNL TOTAL CA: CPT

## 2020-09-07 PROCEDURE — 2580000003 HC RX 258: Performed by: INTERNAL MEDICINE

## 2020-09-07 PROCEDURE — 6360000002 HC RX W HCPCS: Performed by: PODIATRIST

## 2020-09-07 PROCEDURE — 6360000002 HC RX W HCPCS: Performed by: INTERNAL MEDICINE

## 2020-09-07 PROCEDURE — 36415 COLL VENOUS BLD VENIPUNCTURE: CPT

## 2020-09-07 PROCEDURE — 6370000000 HC RX 637 (ALT 250 FOR IP): Performed by: PODIATRIST

## 2020-09-07 PROCEDURE — 80202 ASSAY OF VANCOMYCIN: CPT

## 2020-09-07 PROCEDURE — 2580000003 HC RX 258

## 2020-09-07 PROCEDURE — 97530 THERAPEUTIC ACTIVITIES: CPT

## 2020-09-07 PROCEDURE — 6370000000 HC RX 637 (ALT 250 FOR IP): Performed by: INTERNAL MEDICINE

## 2020-09-07 PROCEDURE — 1200000000 HC SEMI PRIVATE

## 2020-09-07 PROCEDURE — 97116 GAIT TRAINING THERAPY: CPT

## 2020-09-07 RX ORDER — SODIUM CHLORIDE 9 MG/ML
INJECTION, SOLUTION INTRAVENOUS
Status: COMPLETED
Start: 2020-09-07 | End: 2020-09-07

## 2020-09-07 RX ORDER — GLIPIZIDE 5 MG/1
15 TABLET ORAL
Status: DISCONTINUED | OUTPATIENT
Start: 2020-09-07 | End: 2020-09-09 | Stop reason: HOSPADM

## 2020-09-07 RX ADMIN — ENOXAPARIN SODIUM 40 MG: 40 INJECTION SUBCUTANEOUS at 08:07

## 2020-09-07 RX ADMIN — CEFEPIME 2 G: 2 INJECTION, POWDER, FOR SOLUTION INTRAVENOUS at 23:23

## 2020-09-07 RX ADMIN — VANCOMYCIN HYDROCHLORIDE 1250 MG: 10 INJECTION, POWDER, LYOPHILIZED, FOR SOLUTION INTRAVENOUS at 00:58

## 2020-09-07 RX ADMIN — VANCOMYCIN HYDROCHLORIDE 1250 MG: 10 INJECTION, POWDER, LYOPHILIZED, FOR SOLUTION INTRAVENOUS at 12:30

## 2020-09-07 RX ADMIN — OXYCODONE HYDROCHLORIDE AND ACETAMINOPHEN 1 TABLET: 5; 325 TABLET ORAL at 19:49

## 2020-09-07 RX ADMIN — CEFEPIME 2 G: 2 INJECTION, POWDER, FOR SOLUTION INTRAVENOUS at 11:35

## 2020-09-07 RX ADMIN — GLIPIZIDE 15 MG: 5 TABLET ORAL at 08:06

## 2020-09-07 RX ADMIN — CEFEPIME 2 G: 2 INJECTION, POWDER, FOR SOLUTION INTRAVENOUS at 00:19

## 2020-09-07 RX ADMIN — SODIUM CHLORIDE 35 ML: 9 INJECTION, SOLUTION INTRAVENOUS at 00:21

## 2020-09-07 ASSESSMENT — PAIN DESCRIPTION - PAIN TYPE: TYPE: ACUTE PAIN;SURGICAL PAIN

## 2020-09-07 ASSESSMENT — PAIN SCALES - GENERAL
PAINLEVEL_OUTOF10: 0
PAINLEVEL_OUTOF10: 2
PAINLEVEL_OUTOF10: 7

## 2020-09-07 ASSESSMENT — PAIN SCALES - WONG BAKER: WONGBAKER_NUMERICALRESPONSE: 0

## 2020-09-07 ASSESSMENT — PAIN DESCRIPTION - ORIENTATION: ORIENTATION: LEFT

## 2020-09-07 ASSESSMENT — PAIN DESCRIPTION - LOCATION: LOCATION: FOOT

## 2020-09-07 NOTE — PROGRESS NOTES
Hospitalist Progress Note      PCP: Mariann Hill MD, MD    Date of Admission: 9/2/2020    Chief Complaint: Fever, foot pain    Hospital Course: See H&P     Subjective:   Patient is up in bed, comfortable. Not in distress. No new event overnight. Medications:  Reviewed    Infusion Medications    dextrose       Scheduled Medications    glipiZIDE  15 mg Oral QAM AC    vancomycin  1,250 mg Intravenous Q12H    cefepime  2 g Intravenous Q12H    sodium chloride flush  10 mL Intravenous 2 times per day    enoxaparin  40 mg Subcutaneous Daily    insulin lispro  0-12 Units Subcutaneous TID WC    insulin lispro  0-6 Units Subcutaneous Nightly     PRN Meds: perflutren lipid microspheres, oxyCODONE-acetaminophen, glucose, dextrose, glucagon (rDNA), dextrose, sodium chloride flush, acetaminophen **OR** acetaminophen, polyethylene glycol, promethazine **OR** ondansetron      Intake/Output Summary (Last 24 hours) at 9/7/2020 1133  Last data filed at 9/7/2020 0912  Gross per 24 hour   Intake 957 ml   Output 2380 ml   Net -1423 ml       Physical Exam Performed:    /69   Pulse 70   Temp 97.3 °F (36.3 °C) (Oral)   Resp 16   Ht 5' 9\" (1.753 m)   Wt 233 lb 1 oz (105.7 kg)   SpO2 95%   BMI 34.42 kg/m²     General appearance: No apparent distress, appears stated age and cooperative. HEENT: Pupils equal, round, and reactive to light. Conjunctivae/corneas clear. Neck: Supple, with full range of motion. No jugular venous distention. Trachea midline. Respiratory:  Normal respiratory effort. Clear to auscultation, bilaterally without Rales/Wheezes/Rhonchi. Cardiovascular: Regular rate and rhythm with normal S1/S2 without murmurs, rubs or gallops. Abdomen: Soft, non-tender, non-distended with normal bowel sounds. Musculoskeletal: No clubbing, cyanosis or edema bilaterally. Full range of motion without deformity. Skin: Skin color, texture, turgor normal.  No rashes or lesions.   Neurologic: Neurovascularly intact without any focal sensory/motor deficits. Cranial nerves: II-XII intact, grossly non-focal.  Psychiatric: Alert and oriented, thought content appropriate, normal insight  Capillary Refill: Brisk,< 3 seconds   Peripheral Pulses: +2 palpable, equal bilaterally       Labs:   Recent Labs     09/06/20  1106   WBC 8.0   HGB 11.9*   HCT 34.9*        Recent Labs     09/05/20  0932 09/06/20  1106 09/07/20  1010    135* 139   K 4.4 4.4 4.8    98* 104   CO2 25 26 26   BUN 14 11 12   CREATININE 0.8* 0.9 0.9   CALCIUM 8.2* 8.6 9.0     No results for input(s): AST, ALT, BILIDIR, BILITOT, ALKPHOS in the last 72 hours. No results for input(s): INR in the last 72 hours. No results for input(s): Joshua Jean-Pierre in the last 72 hours. Urinalysis:      Lab Results   Component Value Date    NITRU Negative 07/08/2019    WBCUA 0-2 07/08/2019    RBCUA 10-20 07/08/2019    BLOODU SMALL 07/08/2019    SPECGRAV <=1.005 07/08/2019    GLUCOSEU Negative 07/08/2019       Radiology:  MRI FOOT LEFT W WO CONTRAST   Final Result   Diffuse soft tissue edema which is most prominent dorsally. Soft tissue   ulceration along the plantar lateral aspect of the distal foot. There is subtle mild increased T2 signal noted within the 5th metatarsal head   and the 5th digit. No corresponding low T1 signal changes. Findings may be   reactive however concern for early osteomyelitis given adjacent ulceration. XR FOOT LEFT (MIN 3 VIEWS)   Final Result   Suspected bone destruction such as osteomyelitis involving the base of the   1st distal phalanx and lesser degree medial aspect of the adjacent proximal   phalanx across the joint space. This is new from the prior study. Osteomyelitis primarily considered particularly given the history. Other findings above. XR CHEST PORTABLE   Final Result   Unremarkable portable chest radiograph.                  Assessment/Plan:    C/Aleks Stern 0513 Problems    Diagnosis    Sepsis ([facility] [A41.9]     Evolving sepsis- due to suspected OM of Left foot/ Fifth toe, with fever of 103, tachycardia, lactate wnl, nl bolus given in ER  -bcx pending  -started vanc/zosyn on 9/2, pharm assisted dosing  -ID consulted, apprec assistance on abx  -Podiatry consulted for further recs  -ivfs ordered(100/hr x 12hrs), monitor. MRI showing left fifth toe early changes of OM, swelling. Blood cultures positive for Strep, ID and Pod input noted. Continue Zosyn and Zyvox as per ID. S/p Surgery done on 9/4/2020-Procedure(s):  AMPUTATION FIFTH TOE, AND FIFTH METATARSAL HEAD OF LEFT FOOT WITH INCISION AND DEBRIDEMENT (PULSEVAC WITH ANTIBIOTIC, WANTS SAGITTAL SAW)    Plan for IV antibiotic as outpatient, plan to get PICC line, continuing cefepime and vancomycin as per ID.   2D echo has been ordered to rule out SBE, it is pending.       DM2- uncontrolled  -a1c -9.2  Ac/hs bs  Med SSI  -on glipzide at home, continued     BPH- per emr, was on flomax and proscar but not taking(restart if needed)    DVT Prophylaxis: Lovenox  Diet: DIET CARB CONTROL;  Code Status: Full Code    PT/OT Eval Status: Not ordered    Dispo - in 1-2 days    Dagmar Cameron MD

## 2020-09-07 NOTE — PLAN OF CARE
Problem: Pain:  Goal: Pain level will decrease  Description: Pain level will decrease  9/6/2020 2142 by Orlando Lu RN  Outcome: Ongoing   Rating pain 6/10. Percocet given see MAR. Re-assessment satisfied. Problem: Falls - Risk of:  Goal: Will remain free from falls  Description: Will remain free from falls  9/6/2020 2142 by Orlando Lu RN  Outcome: Ongoing   Worked with PT using a walker is non-weight bearing on L foot d/t amp. Calls out jane. To ambulate. Bed locked, in lowest position, and call light within reach. Problem: Serum Glucose Level - Abnormal:  Goal: Ability to maintain appropriate glucose levels will improve  Description: Ability to maintain appropriate glucose levels will improve  9/6/2020 2142 by Orlando Lu RN  Outcome: Ongoing  Blood glucose level measured at HS. SSI given per protocol.  On carb control diet and restarting oral anti-diabetic med in am.     Problem: Skin Integrity:  Goal: Will show no infection signs and symptoms  Description: Will show no infection signs and symptoms  9/6/2020 2142 by Orlando Lu RN  Outcome: Ongoing

## 2020-09-07 NOTE — PROGRESS NOTES
includes orthopedic surgery; Ankle surgery; Toe amputation (Right, 7/8/2019); and Foot Debridement (Right, 7/11/2019). Restrictions  Restrictions/Precautions  Restrictions/Precautions: Weight Bearing, General Precautions, Fall Risk, Contact Precautions  Lower Extremity Weight Bearing Restrictions  Left Lower Extremity Weight Bearing: Non Weight Bearing  Position Activity Restriction  Other position/activity restrictions: Telemetry, up with assist  Subjective   General  Additional Pertinent Hx: CC osteomyelitis. 9/4/20 amputation of left 5th toe and 5th metatarsal head. Referring Practitioner: Vandana Hylton DPM  Subjective  Subjective: Pt supine in bed on approach, pleasant and agreeable to PT tx  Pain Assessment  Pain Assessment: Faces  Knowles-Marinelli Pain Rating: No hurt       Orientation  Orientation  Overall Orientation Status: Within Normal Limits  Objective   Bed mobility  Rolling to Left: Independent  Rolling to Right: Independent  Supine to Sit: Independent  Transfers  Sit to Stand: Stand by assistance;Contact guard assistance  Stand to sit: Stand by assistance;Contact guard assistance  Ambulation  Ambulation?: Yes  WB Status: NWB LLE  Ambulation 1  Surface: level tile  Device: Rolling Walker  Assistance: Contact guard assistance  Quality of Gait: Hop to gait pattern with decreased foot clearance and increased UE support, slight forward flexed trunk. cues provided for safety. Pt mildly unsteady no overt LOB  Distance: 20 ft  Stairs/Curb  Stairs?: Yes(unable to complete)  Stairs  # Steps : 1  Stairs Height: 6\"  Rails: Bilateral  Comments: Attempted multiple times - unable to clear foot.    Balance  Sitting - Static: Good  Sitting - Dynamic: Good  Standing - Static: Fair;+  Standing - Dynamic: Fair  Exercises  Comments: sitting EOB for approx 5 mins     AM-PAC Score  AM-PAC Inpatient Mobility Raw Score : 19 (09/07/20 0815)  AM-PAC Inpatient T-Scale Score : 45.44 (09/07/20 0815)  Mobility Inpatient CMS

## 2020-09-08 LAB
ANION GAP SERPL CALCULATED.3IONS-SCNC: 10 MMOL/L (ref 3–16)
BANDED NEUTROPHILS RELATIVE PERCENT: 3 % (ref 0–7)
BASOPHILS ABSOLUTE: 0 K/UL (ref 0–0.2)
BASOPHILS RELATIVE PERCENT: 0 %
BLOOD CULTURE, ROUTINE: NORMAL
BUN BLDV-MCNC: 13 MG/DL (ref 7–20)
CALCIUM SERPL-MCNC: 9.2 MG/DL (ref 8.3–10.6)
CHLORIDE BLD-SCNC: 104 MMOL/L (ref 99–110)
CO2: 24 MMOL/L (ref 21–32)
CREAT SERPL-MCNC: 0.8 MG/DL (ref 0.9–1.3)
CULTURE, BLOOD 2: NORMAL
EOSINOPHILS ABSOLUTE: 0.4 K/UL (ref 0–0.6)
EOSINOPHILS RELATIVE PERCENT: 4 %
GFR AFRICAN AMERICAN: >60
GFR NON-AFRICAN AMERICAN: >60
GLUCOSE BLD-MCNC: 101 MG/DL (ref 70–99)
GLUCOSE BLD-MCNC: 127 MG/DL (ref 70–99)
GLUCOSE BLD-MCNC: 130 MG/DL (ref 70–99)
GLUCOSE BLD-MCNC: 149 MG/DL (ref 70–99)
GLUCOSE BLD-MCNC: 176 MG/DL (ref 70–99)
HCT VFR BLD CALC: 37.9 % (ref 40.5–52.5)
HEMATOLOGY PATH CONSULT: NORMAL
HEMATOLOGY PATH CONSULT: YES
HEMOGLOBIN: 12.6 G/DL (ref 13.5–17.5)
LYMPHOCYTES ABSOLUTE: 2.5 K/UL (ref 1–5.1)
LYMPHOCYTES RELATIVE PERCENT: 28 %
MACROCYTES: ABNORMAL
MCH RBC QN AUTO: 29.7 PG (ref 26–34)
MCHC RBC AUTO-ENTMCNC: 33.2 G/DL (ref 31–36)
MCV RBC AUTO: 89.2 FL (ref 80–100)
MONOCYTES ABSOLUTE: 0.5 K/UL (ref 0–1.3)
MONOCYTES RELATIVE PERCENT: 6 %
MONONUCLEAR UNIDENTIFIED CELLS: 1 %
MYELOCYTE PERCENT: 1 %
NEUTROPHILS ABSOLUTE: 5.5 K/UL (ref 1.7–7.7)
NEUTROPHILS RELATIVE PERCENT: 57 %
PDW BLD-RTO: 13.4 % (ref 12.4–15.4)
PERFORMED ON: ABNORMAL
PLATELET # BLD: 358 K/UL (ref 135–450)
PMV BLD AUTO: 7.1 FL (ref 5–10.5)
POTASSIUM REFLEX MAGNESIUM: 4.7 MMOL/L (ref 3.5–5.1)
RBC # BLD: 4.24 M/UL (ref 4.2–5.9)
SODIUM BLD-SCNC: 138 MMOL/L (ref 136–145)
WBC # BLD: 9 K/UL (ref 4–11)

## 2020-09-08 PROCEDURE — 6370000000 HC RX 637 (ALT 250 FOR IP): Performed by: INTERNAL MEDICINE

## 2020-09-08 PROCEDURE — 2580000003 HC RX 258: Performed by: PODIATRIST

## 2020-09-08 PROCEDURE — 1200000000 HC SEMI PRIVATE

## 2020-09-08 PROCEDURE — 80048 BASIC METABOLIC PNL TOTAL CA: CPT

## 2020-09-08 PROCEDURE — 6370000000 HC RX 637 (ALT 250 FOR IP): Performed by: PODIATRIST

## 2020-09-08 PROCEDURE — 85025 COMPLETE CBC W/AUTO DIFF WBC: CPT

## 2020-09-08 PROCEDURE — 97116 GAIT TRAINING THERAPY: CPT

## 2020-09-08 PROCEDURE — 97530 THERAPEUTIC ACTIVITIES: CPT

## 2020-09-08 PROCEDURE — 36415 COLL VENOUS BLD VENIPUNCTURE: CPT

## 2020-09-08 PROCEDURE — 99232 SBSQ HOSP IP/OBS MODERATE 35: CPT | Performed by: INTERNAL MEDICINE

## 2020-09-08 PROCEDURE — 2580000003 HC RX 258: Performed by: INTERNAL MEDICINE

## 2020-09-08 PROCEDURE — 6360000002 HC RX W HCPCS: Performed by: PODIATRIST

## 2020-09-08 PROCEDURE — 6360000002 HC RX W HCPCS: Performed by: INTERNAL MEDICINE

## 2020-09-08 RX ORDER — SODIUM CHLORIDE 9 MG/ML
INJECTION, SOLUTION INTRAVENOUS
Status: DISPENSED
Start: 2020-09-08 | End: 2020-09-09

## 2020-09-08 RX ADMIN — OXYCODONE HYDROCHLORIDE AND ACETAMINOPHEN 1 TABLET: 5; 325 TABLET ORAL at 18:09

## 2020-09-08 RX ADMIN — VANCOMYCIN HYDROCHLORIDE 1250 MG: 10 INJECTION, POWDER, LYOPHILIZED, FOR SOLUTION INTRAVENOUS at 00:14

## 2020-09-08 RX ADMIN — Medication 10 ML: at 22:09

## 2020-09-08 RX ADMIN — CEFEPIME 2 G: 2 INJECTION, POWDER, FOR SOLUTION INTRAVENOUS at 12:49

## 2020-09-08 RX ADMIN — CEFEPIME 2 G: 2 INJECTION, POWDER, FOR SOLUTION INTRAVENOUS at 23:29

## 2020-09-08 RX ADMIN — GLIPIZIDE 15 MG: 5 TABLET ORAL at 08:57

## 2020-09-08 RX ADMIN — ENOXAPARIN SODIUM 40 MG: 40 INJECTION SUBCUTANEOUS at 08:57

## 2020-09-08 RX ADMIN — Medication 10 ML: at 08:57

## 2020-09-08 RX ADMIN — OXYCODONE HYDROCHLORIDE AND ACETAMINOPHEN 1 TABLET: 5; 325 TABLET ORAL at 08:57

## 2020-09-08 RX ADMIN — VANCOMYCIN HYDROCHLORIDE 1250 MG: 10 INJECTION, POWDER, LYOPHILIZED, FOR SOLUTION INTRAVENOUS at 14:39

## 2020-09-08 RX ADMIN — OXYCODONE HYDROCHLORIDE AND ACETAMINOPHEN 1 TABLET: 5; 325 TABLET ORAL at 22:09

## 2020-09-08 ASSESSMENT — PAIN DESCRIPTION - ONSET
ONSET: ON-GOING
ONSET: ON-GOING

## 2020-09-08 ASSESSMENT — PAIN DESCRIPTION - PROGRESSION
CLINICAL_PROGRESSION: RESOLVED

## 2020-09-08 ASSESSMENT — PAIN SCALES - GENERAL
PAINLEVEL_OUTOF10: 0
PAINLEVEL_OUTOF10: 6
PAINLEVEL_OUTOF10: 0
PAINLEVEL_OUTOF10: 6
PAINLEVEL_OUTOF10: 0
PAINLEVEL_OUTOF10: 6

## 2020-09-08 ASSESSMENT — PAIN DESCRIPTION - PAIN TYPE
TYPE: ACUTE PAIN

## 2020-09-08 ASSESSMENT — PAIN DESCRIPTION - ORIENTATION
ORIENTATION: LEFT

## 2020-09-08 ASSESSMENT — PAIN DESCRIPTION - LOCATION
LOCATION: FOOT

## 2020-09-08 ASSESSMENT — PAIN DESCRIPTION - DESCRIPTORS
DESCRIPTORS: ACHING
DESCRIPTORS: ACHING;DISCOMFORT
DESCRIPTORS: ACHING;DISCOMFORT

## 2020-09-08 ASSESSMENT — PAIN SCALES - WONG BAKER: WONGBAKER_NUMERICALRESPONSE: 2

## 2020-09-08 ASSESSMENT — PAIN DESCRIPTION - FREQUENCY
FREQUENCY: CONTINUOUS
FREQUENCY: CONTINUOUS

## 2020-09-08 NOTE — PROGRESS NOTES
Infectious Disease Follow up Notes    CC :  DFI, bacteremia      Antibiotics:   Cefepime 2g q12  vanc 1250 q12    Admit Date:   9/2/2020  Hospital Day: 7    Subjective:   He remains afebrile   He is feeling well. No pain in the L foot  Seems to be tolerating the abx well so far. Objective:     Patient Vitals for the past 8 hrs:   BP Temp Temp src Pulse Resp SpO2   09/08/20 1117 115/67 97.9 °F (36.6 °C) Oral 68 17 98 %   09/08/20 0845 (!) 140/71 98 °F (36.7 °C) Oral 68 18 94 %       EXAM:  General:  Alert,oriented, NAD    HEENT:  NCAT, PERRL, sclera anicteric   MMM, no thrush  NECK:  supple      LUNGS:  Non-labored breathing       ABD:  Soft, flat, NT      EXT: L foot with bulky dressing.   No ascending cellulitis   SKIN: No focal rash         LINE: IV site ok         Scheduled Meds:   glipiZIDE  15 mg Oral QAM AC    vancomycin  1,250 mg Intravenous Q12H    cefepime  2 g Intravenous Q12H    sodium chloride flush  10 mL Intravenous 2 times per day    enoxaparin  40 mg Subcutaneous Daily    insulin lispro  0-12 Units Subcutaneous TID WC    insulin lispro  0-6 Units Subcutaneous Nightly       Continuous Infusions:   dextrose            Data Review:    Lab Results   Component Value Date    WBC 9.0 09/08/2020    HGB 12.6 (L) 09/08/2020    HCT 37.9 (L) 09/08/2020    MCV 89.2 09/08/2020     09/08/2020     Lab Results   Component Value Date    CREATININE 0.8 (L) 09/08/2020    BUN 13 09/08/2020     09/08/2020    K 4.7 09/08/2020     09/08/2020    CO2 24 09/08/2020       Hepatic Function Panel:   Lab Results   Component Value Date    ALKPHOS 112 09/02/2020    ALT 18 09/02/2020    AST 24 09/02/2020    PROT 7.7 09/02/2020    BILITOT 1.1 09/02/2020    LABALBU 4.0 09/02/2020         Cultures:   9/2       BC x2 GpBS and Staph aureus mrsa  Antibiotic  Interpretation  HUMERA  Status     ceFAZolin  Resistant  <=4  mcg/mL clindamycin  Sensitive  <=0.5  mcg/mL      erythromycin  Resistant  >4  mcg/mL      oxacillin  Resistant  >2  mcg/mL      tetracycline  Resistant  >8  mcg/mL      trimethoprim-sulfamethoxazole  Sensitive  <=0.5/9.5  mcg/mL      vancomycin  Sensitive  1  mcg/mL        9/3 Wound culture MRSA same HUMERA profile    COVID NAAT neg   9/4 BC x2 neg   Surgical culture foot bone ox-S S epi     Clearance fragment bone pan-S Pseudomonas        Radiology Review:  All pertinent images / reports were reviewed as a part of this visit. Xray L foot 9/2/20   Impression    Suspected bone destruction such as osteomyelitis involving the base of the    1st distal phalanx and lesser degree medial aspect of the adjacent proximal    phalanx across the joint space.  This is new from the prior study. Osteomyelitis primarily considered particularly given the history.         Other findings above.           MRI L foot 9/3/20   Impression    Diffuse soft tissue edema which is most prominent dorsally.  Soft tissue    ulceration along the plantar lateral aspect of the distal foot.         There is subtle mild increased T2 signal noted within the 5th metatarsal head    and the 5th digit.  No corresponding low T1 signal changes.  Findings may be    reactive however concern for early osteomyelitis given adjacent ulceration.         Assessment:     Patient Active Problem List    Diagnosis Date Noted    Receiving intravenous antibiotic treatment as outpatient     Weight loss counseling, encounter for     High fever     Bandemia     Closed displaced fracture of fifth metatarsal bone of right foot     Elevated sed rate     Elevated C-reactive protein (CRP)     Overweight     Failure of outpatient treatment     Diabetic polyneuropathy associated with type 2 diabetes mellitus ([address]     Osteomyelitis ([address] 07/07/2019    Right foot infection 07/07/2019    Sepsis ([address] 07/07/2019    Uncontrolled type 2 diabetes mellitus with hyperglycemia

## 2020-09-08 NOTE — PROGRESS NOTES
Hospitalist Progress Note      PCP: Mariann Hill MD, MD    Date of Admission: 9/2/2020    Chief Complaint: Fever, foot pain    Hospital Course: See H&P     Subjective:   Patient is up in bed, comfortable. Not in distress. No new event overnight. Medications:  Reviewed    Infusion Medications    dextrose       Scheduled Medications    glipiZIDE  15 mg Oral QAM AC    vancomycin  1,250 mg Intravenous Q12H    cefepime  2 g Intravenous Q12H    sodium chloride flush  10 mL Intravenous 2 times per day    enoxaparin  40 mg Subcutaneous Daily    insulin lispro  0-12 Units Subcutaneous TID WC    insulin lispro  0-6 Units Subcutaneous Nightly     PRN Meds: perflutren lipid microspheres, oxyCODONE-acetaminophen, glucose, dextrose, glucagon (rDNA), dextrose, sodium chloride flush, acetaminophen **OR** acetaminophen, polyethylene glycol, promethazine **OR** ondansetron      Intake/Output Summary (Last 24 hours) at 9/8/2020 1038  Last data filed at 9/8/2020 0813  Gross per 24 hour   Intake 1206 ml   Output 2970 ml   Net -1764 ml       Physical Exam Performed:    BP (!) 140/71   Pulse 68   Temp 98 °F (36.7 °C) (Oral)   Resp 18   Ht 5' 9\" (1.753 m)   Wt 233 lb 1 oz (105.7 kg)   SpO2 94%   BMI 34.42 kg/m²     General appearance: No apparent distress, appears stated age and cooperative. HEENT: Pupils equal, round, and reactive to light. Conjunctivae/corneas clear. Neck: Supple, with full range of motion. No jugular venous distention. Trachea midline. Respiratory:  Normal respiratory effort. Clear to auscultation, bilaterally without Rales/Wheezes/Rhonchi. Cardiovascular: Regular rate and rhythm with normal S1/S2 without murmurs, rubs or gallops. Abdomen: Soft, non-tender, non-distended with normal bowel sounds. Musculoskeletal: No clubbing, cyanosis or edema bilaterally. Full range of motion without deformity. Skin: Skin color, texture, turgor normal.  No rashes or lesions.   Neurologic: ER  -bcx pending  -started vanc/zosyn on 9/2, pharm assisted dosing  -ID consulted, apprec assistance on abx  -Podiatry consulted for further recs  -ivfs ordered(100/hr x 12hrs), monitor. MRI showing left fifth toe early changes of OM, swelling. Blood cultures positive for Strep, ID and Pod input noted. Continue Zosyn and Zyvox as per ID. S/p Surgery done on 9/4/2020-Procedure(s):  AMPUTATION FIFTH TOE, AND FIFTH METATARSAL HEAD OF LEFT FOOT WITH INCISION AND DEBRIDEMENT (PULSEVAC WITH ANTIBIOTIC, WANTS SAGITTAL SAW)    Plan for IV antibiotic as outpatient, plan to get PICC line, continuing cefepime and vancomycin as per ID.   2D echo has been ordered to rule out SBE, it is pending.       DM2- uncontrolled  -a1c -9.2  Ac/hs bs  Med SSI  -on glipzide at home, continued     BPH- per emr, was on flomax and proscar but not taking(restart if needed)    DVT Prophylaxis: Lovenox  Diet: DIET CARB CONTROL;  Code Status: Full Code    PT/OT Eval Status: Not ordered    Dispo - in 1-2 days    Gary Green MD

## 2020-09-08 NOTE — CARE COORDINATION
Received a call from patient's wife Glenn Ness. She is concerned that patient needs a walker on discharge. PT note clearly sytates that a walker is needed. Will follow to make certain a DME is written for walker at discharge. She also clarified that patient no longer has [employer]. His insurNce now is [employer] [id number]. Glenn Ness states that the ED registrar scanned the [place] card , however it remains [employer] in the MR. This writer contacted registration with updated insurance info.     Tracy Miles RN

## 2020-09-08 NOTE — CARE COORDINATION
Patient will be dc'd home with PICC and Cefepime G2 IVPB Q12H  Vancomycin 1250mg IVPB [email or web address]  Patient verifies that [facility] is Agency he has used in past.  Referral faxed to [facility] and spoke with liaison. They will run insurance verification.     Mateo Oscar RN

## 2020-09-08 NOTE — PROGRESS NOTES
Physical Therapy  Facility/Department: [facility] A2 CARD TELEMETRY  Daily Treatment Note  NAME: Viviana Kidd  : 1965  MRN: [id number]    Date of Service: 2020    Discharge Recommendations:  Home with Home health PT, 24 hour supervision or assist   PT Equipment Recommendations  Equipment Needed: Yes  Mobility Devices: [place]: Rolling    Assessment    Body structures, Functions, Activity limitations: Decreased functional mobility ; Decreased sensation; Increased pain;Decreased ADL status; Decreased balance;Decreased strength;Decreased high-level IADLs;Decreased ROM; Decreased endurance  Assessment: Patient seen for gait and step up training. Patient cleared by RN for therapy participation this date. Patient agreeable to therapy. Patient completed transfers with CGA-SBA with RW with cues for hand placement. Pt ambulated up to 20 ft in room with RW with pt limited by fatigue. Pt completed 2x 6\" forward step ups with RW with pt hopping up and forward off of step up with min A for balance. Pt reports he will have assistance when completing stairs to enter apt at d/c. Pt stood ~7 mins at sink with 1 UE support on sink with SBA-supervision for balance with pt able to maintain LLE NWB while pt brushing teeth and washing face. P.T .will continue to follow throughout LOS. Recommending DC to home with 24/7 supervision and home PT. Treatment Diagnosis: decreased independence with functional mobility. Specific instructions for Next Treatment: progress mobility as tolerated. Further stair training to simulate home enviornment  Prognosis: Good  Decision Making: Medium Complexity  PT Education: Goals; General Safety;Gait Training;PT Role;Disease Specific Education;Plan of Care; Functional Mobility Training;Home Exercise Program;Precautions;Transfer Training;Weight-bearing Education; Injury Prevention;Pressure Relief  Patient Education: Pt educated on techniques for stair activities and recommendation of x1 person assist with gait and stairs, reviewed NWB and maintenance with functional mobility, pt verbalized understanding  Barriers to Learning: none  REQUIRES PT FOLLOW UP: Yes  Activity Tolerance  Activity Tolerance: Patient limited by endurance; Patient limited by fatigue     Patient Diagnosis(es): The primary encounter diagnosis was Osteomyelitis of left foot, unspecified type ([facility] A diagnosis of Septicemia ([facility] was also pertinent to this visit. has a past medical history of Diabetes mellitus ([facility] Foot ulcer ([facility] and MRSA (methicillin resistant staph aureus) culture positive. has a past surgical history that includes orthopedic surgery; Ankle surgery; Toe amputation (Right, 7/8/2019); Foot Debridement (Right, 7/11/2019); and Toe amputation (Left, 9/4/2020). Restrictions  Restrictions/Precautions  Restrictions/Precautions: Weight Bearing, General Precautions, Fall Risk, Contact Precautions  Lower Extremity Weight Bearing Restrictions  Left Lower Extremity Weight Bearing: Non Weight Bearing  Position Activity Restriction  Other position/activity restrictions: Telemetry, up with assist  Subjective   General  Chart Reviewed: Yes  Additional Pertinent Hx: CC osteomyelitis. 9/4/20 amputation of left 5th toe and 5th metatarsal head. Response To Previous Treatment: Patient with no complaints from previous session.   Family / Caregiver Present: No  Referring Practitioner: Curt Up DPM  Subjective  Subjective: pt in bed, agreeable to PT treatment session  Pain Screening  Patient Currently in Pain: Yes  Pain Assessment  Pain Assessment: Faces  Knowles-Baker Pain Rating: Hurts a little bit  Pain Type: Acute pain  Pain Location: Foot  Pain Orientation: Left  Pain Descriptors: Aching  Non-Pharmaceutical Pain Intervention(s): Ambulation/Increased Activity;Repositioned  Vital Signs  Patient Currently in Pain: Yes       Orientation  Orientation  Overall Orientation Status: Within Normal Limits     Objective   Bed 3-5/week  Times per day: Daily  Plan weeks: 1 week 9/11/20  Specific instructions for Next Treatment: progress mobility as tolerated. Further stair training to simulate home enviornment  Current Treatment Recommendations: Strengthening, Balance Training, Endurance Training, Patient/Caregiver Education & Training, Safety Education & Training, Functional Mobility Training, Equipment Evaluation, Education, & procurement, Gait Training, Transfer Training, ADL/Self-care Training, Stair training, Pain Management, Home Exercise Program, ROM  Safety Devices  Type of devices: All fall risk precautions in place, Nurse notified, Gait belt, Call light within reach, Chair alarm in place, Left in chair  Restraints  Initially in place: No     Therapy Time   Individual Concurrent Group Co-treatment   Time In 0908         Time Out 0946         Minutes 38         Timed Code Treatment Minutes: 38 Minutes     If pt is unable to be seen after this session, please let this note serve as discharge summary. Please see case management note for discharge disposition. Thank you.     Larisa Patterson, PT

## 2020-09-09 ENCOUNTER — APPOINTMENT (OUTPATIENT)
Dept: INTERVENTIONAL RADIOLOGY/VASCULAR | Age: 55
DRG: 854 | End: 2020-09-09
Payer: COMMERCIAL

## 2020-09-09 VITALS
BODY MASS INDEX: 34.52 KG/M2 | OXYGEN SATURATION: 97 % | TEMPERATURE: 99.4 F | HEIGHT: 69 IN | DIASTOLIC BLOOD PRESSURE: 75 MMHG | WEIGHT: 233.06 LBS | SYSTOLIC BLOOD PRESSURE: 149 MMHG | RESPIRATION RATE: 16 BRPM | HEART RATE: 63 BPM

## 2020-09-09 LAB
ANAEROBIC CULTURE: ABNORMAL
ANAEROBIC CULTURE: ABNORMAL
ANION GAP SERPL CALCULATED.3IONS-SCNC: 10 MMOL/L (ref 3–16)
BUN BLDV-MCNC: 14 MG/DL (ref 7–20)
CALCIUM SERPL-MCNC: 9.5 MG/DL (ref 8.3–10.6)
CHLORIDE BLD-SCNC: 103 MMOL/L (ref 99–110)
CO2: 27 MMOL/L (ref 21–32)
CREAT SERPL-MCNC: 0.9 MG/DL (ref 0.9–1.3)
CULTURE SURGICAL: ABNORMAL
GFR AFRICAN AMERICAN: >60
GFR NON-AFRICAN AMERICAN: >60
GLUCOSE BLD-MCNC: 112 MG/DL (ref 70–99)
GLUCOSE BLD-MCNC: 117 MG/DL (ref 70–99)
GLUCOSE BLD-MCNC: 96 MG/DL (ref 70–99)
GRAM STAIN RESULT: ABNORMAL
GRAM STAIN RESULT: ABNORMAL
LV EF: 53 %
LVEF MODALITY: NORMAL
ORGANISM: ABNORMAL
PERFORMED ON: ABNORMAL
PERFORMED ON: NORMAL
POTASSIUM REFLEX MAGNESIUM: 4.7 MMOL/L (ref 3.5–5.1)
SODIUM BLD-SCNC: 140 MMOL/L (ref 136–145)

## 2020-09-09 PROCEDURE — 93306 TTE W/DOPPLER COMPLETE: CPT

## 2020-09-09 PROCEDURE — 80048 BASIC METABOLIC PNL TOTAL CA: CPT

## 2020-09-09 PROCEDURE — 2580000003 HC RX 258: Performed by: INTERNAL MEDICINE

## 2020-09-09 PROCEDURE — 6370000000 HC RX 637 (ALT 250 FOR IP): Performed by: INTERNAL MEDICINE

## 2020-09-09 PROCEDURE — 36573 INSJ PICC RS&I 5 YR+: CPT

## 2020-09-09 PROCEDURE — 02HV33Z INSERTION OF INFUSION DEVICE INTO SUPERIOR VENA CAVA, PERCUTANEOUS APPROACH: ICD-10-PCS | Performed by: INTERNAL MEDICINE

## 2020-09-09 PROCEDURE — C1751 CATH, INF, PER/CENT/MIDLINE: HCPCS

## 2020-09-09 PROCEDURE — 99232 SBSQ HOSP IP/OBS MODERATE 35: CPT | Performed by: INTERNAL MEDICINE

## 2020-09-09 PROCEDURE — 2580000003 HC RX 258: Performed by: PODIATRIST

## 2020-09-09 PROCEDURE — 6370000000 HC RX 637 (ALT 250 FOR IP): Performed by: PODIATRIST

## 2020-09-09 PROCEDURE — 6360000002 HC RX W HCPCS: Performed by: INTERNAL MEDICINE

## 2020-09-09 PROCEDURE — 36415 COLL VENOUS BLD VENIPUNCTURE: CPT

## 2020-09-09 PROCEDURE — 6360000002 HC RX W HCPCS: Performed by: PODIATRIST

## 2020-09-09 RX ADMIN — CEFEPIME 2 G: 2 INJECTION, POWDER, FOR SOLUTION INTRAVENOUS at 11:58

## 2020-09-09 RX ADMIN — VANCOMYCIN HYDROCHLORIDE 1250 MG: 10 INJECTION, POWDER, LYOPHILIZED, FOR SOLUTION INTRAVENOUS at 00:08

## 2020-09-09 RX ADMIN — Medication 10 ML: at 08:20

## 2020-09-09 RX ADMIN — ENOXAPARIN SODIUM 40 MG: 40 INJECTION SUBCUTANEOUS at 08:19

## 2020-09-09 RX ADMIN — OXYCODONE HYDROCHLORIDE AND ACETAMINOPHEN 1 TABLET: 5; 325 TABLET ORAL at 06:19

## 2020-09-09 RX ADMIN — VANCOMYCIN HYDROCHLORIDE 1250 MG: 10 INJECTION, POWDER, LYOPHILIZED, FOR SOLUTION INTRAVENOUS at 13:01

## 2020-09-09 RX ADMIN — GLIPIZIDE 15 MG: 5 TABLET ORAL at 06:19

## 2020-09-09 ASSESSMENT — PAIN DESCRIPTION - PROGRESSION
CLINICAL_PROGRESSION: RESOLVED

## 2020-09-09 ASSESSMENT — PAIN SCALES - GENERAL
PAINLEVEL_OUTOF10: 6
PAINLEVEL_OUTOF10: 0
PAINLEVEL_OUTOF10: 0

## 2020-09-09 NOTE — PROGRESS NOTES
Secure message sent to Dr Noris Whitaker, podiatry, \"Pt is waiting to get a PICC line with a potential DC today. He will be DC with HH and they will be doing his dressing change unless he is to follow up at the office for them. When he DC's he will need orders as to what they need to do. Just an FYI so when you see him today you are aware of what is needed for his DC.   Thanks\"  Artemio Grounds

## 2020-09-09 NOTE — PROGRESS NOTES
Follow up from Dr Reena Guzmán via Vicenta Gross, \"I dont need to see him today. He can go anytime.  The dressing is to stay dry and intact and he is to make appointment with me on Monday or Tuesday next week and thats in his discharge planning already\"  ELLA Carlson

## 2020-09-09 NOTE — PROGRESS NOTES
Hospitalist Progress Note      PCP: Danny Connell MD, MD    Date of Admission: 9/2/2020    Chief Complaint: Fever, foot pain    Hospital Course: See H&P     Subjective:   Patient is up in bed, comfortable. Not in distress. No new event overnight. Medications:  Reviewed    Infusion Medications    sodium chloride      dextrose       Scheduled Medications    glipiZIDE  15 mg Oral QAM AC    vancomycin  1,250 mg Intravenous Q12H    cefepime  2 g Intravenous Q12H    sodium chloride flush  10 mL Intravenous 2 times per day    enoxaparin  40 mg Subcutaneous Daily    insulin lispro  0-12 Units Subcutaneous TID WC    insulin lispro  0-6 Units Subcutaneous Nightly     PRN Meds: perflutren lipid microspheres, oxyCODONE-acetaminophen, glucose, dextrose, glucagon (rDNA), dextrose, sodium chloride flush, acetaminophen **OR** acetaminophen, polyethylene glycol, promethazine **OR** ondansetron      Intake/Output Summary (Last 24 hours) at 9/9/2020 0934  Last data filed at 9/9/2020 7694  Gross per 24 hour   Intake 1080 ml   Output 800 ml   Net 280 ml       Physical Exam Performed:    /67   Pulse 61   Temp 98 °F (36.7 °C) (Oral)   Resp 16   Ht 5' 9\" (1.753 m)   Wt 233 lb 1 oz (105.7 kg)   SpO2 96%   BMI 34.42 kg/m²     General appearance: No apparent distress, appears stated age and cooperative. HEENT: Pupils equal, round, and reactive to light. Conjunctivae/corneas clear. Neck: Supple, with full range of motion. No jugular venous distention. Trachea midline. Respiratory:  Normal respiratory effort. Clear to auscultation, bilaterally without Rales/Wheezes/Rhonchi. Cardiovascular: Regular rate and rhythm with normal S1/S2 without murmurs, rubs or gallops. Abdomen: Soft, non-tender, non-distended with normal bowel sounds. Musculoskeletal: No clubbing, cyanosis or edema bilaterally. Full range of motion without deformity.   Skin: Skin color, texture, turgor normal.  No rashes or lesions. Neurologic:  Neurovascularly intact without any focal sensory/motor deficits. Cranial nerves: II-XII intact, grossly non-focal.  Psychiatric: Alert and oriented, thought content appropriate, normal insight  Capillary Refill: Brisk,< 3 seconds   Peripheral Pulses: +2 palpable, equal bilaterally       Labs:   Recent Labs     09/06/20  1106 09/08/20  0814   WBC 8.0 9.0   HGB 11.9* 12.6*   HCT 34.9* 37.9*    358     Recent Labs     09/07/20  1010 09/08/20  0814 09/09/20  0728    138 140   K 4.8 4.7 4.7    104 103   CO2 26 24 27   BUN 12 13 14   CREATININE 0.9 0.8* 0.9   CALCIUM 9.0 9.2 9.5       Urinalysis:      Lab Results   Component Value Date    NITRU Negative 07/08/2019    WBCUA 0-2 07/08/2019    RBCUA 10-20 07/08/2019    BLOODU SMALL 07/08/2019    SPECGRAV <=1.005 07/08/2019    GLUCOSEU Negative 07/08/2019       Radiology:  MRI FOOT LEFT W WO CONTRAST   Final Result   Diffuse soft tissue edema which is most prominent dorsally. Soft tissue   ulceration along the plantar lateral aspect of the distal foot. There is subtle mild increased T2 signal noted within the 5th metatarsal head   and the 5th digit. No corresponding low T1 signal changes. Findings may be   reactive however concern for early osteomyelitis given adjacent ulceration. XR FOOT LEFT (MIN 3 VIEWS)   Final Result   Suspected bone destruction such as osteomyelitis involving the base of the   1st distal phalanx and lesser degree medial aspect of the adjacent proximal   phalanx across the joint space. This is new from the prior study. Osteomyelitis primarily considered particularly given the history. Other findings above. XR CHEST PORTABLE   Final Result   Unremarkable portable chest radiograph.          IR PICC WO SQ PORT/PUMP > 5 YEARS    (Results Pending)           Assessment/Plan:    Active Hospital Problems    Diagnosis    Sepsis ([address] [A41.9]     Evolving sepsis- due to suspected OM of Left foot/ Fifth toe, with fever of 103, tachycardia, lactate wnl, nl bolus given in ER  -bcx pending  -started vanc/zosyn on 9/2, pharm assisted dosing  -ID consulted, apprec assistance on abx  -Podiatry consulted for further recs  -ivfs ordered(100/hr x 12hrs), monitor. MRI showing left fifth toe early changes of OM, swelling. Blood cultures positive for Strep, ID and Pod input noted. Continue Zosyn and Zyvox as per ID. S/p Surgery done on 9/4/2020-Procedure(s):  AMPUTATION FIFTH TOE, AND FIFTH METATARSAL HEAD OF LEFT FOOT WITH INCISION AND DEBRIDEMENT (PULSEVAC WITH ANTIBIOTIC, WANTS SAGITTAL SAW)    Plan for IV antibiotic as outpatient, plan to get PICC line, continuing cefepime and vancomycin as per ID.   2D echo has been ordered to rule out SBE, it is pending.       DM2- uncontrolled  -a1c -9.2  Ac/hs bs  Med SSI  -on glipzide at home, continued     BPH- per emr, was on flomax and proscar but not taking(restart if needed)    DVT Prophylaxis: Lovenox  Diet: DIET CARB CONTROL;  Code Status: Full Code    PT/OT Eval Status: Not ordered    Dispo - in 1-2 days    Maribell Barron MD

## 2020-09-09 NOTE — CARE COORDINATION
Call placed to [facility] to check benefits; they are currently being run and liaison will call CM with updates shortly. Referral also placed to [facility] with Gosia Concepcion in intake for Soumya Montes including nursing with PICC care, PT/OT. Gosia Concepcion will call shortly if agency is able to accept pt at DC. Order for RW placed and Jaye Stalling with [facility] notified. He is aware and will deliver prior to DC.      Lore Ruffin RN

## 2020-09-09 NOTE — PROGRESS NOTES
Infectious Disease Follow up Notes    CC :  DFI, bacteremia      Antibiotics:   Cefepime 2g q12  vanc 1250 q12    Admit Date:   9/2/2020  Hospital Day: 8    Subjective:   He remains afebrile   Eager for discharge. No acute interval changes     Objective:     Patient Vitals for the past 8 hrs:   BP Temp Temp src Pulse Resp SpO2   09/09/20 1200 117/64 97.9 °F (36.6 °C) Oral 62 16 96 %   09/09/20 0815 134/67 98 °F (36.7 °C) Oral 61 16 96 %       EXAM:  General:  Alert,oriented, NAD    HEENT:  NCAT, PERRL, sclera anicteric   MMM, no thrush  NECK:  supple      LUNGS:  Non-labored breathing       ABD:  Soft, flat, NT      EXT: L foot with bulky dressing.   No ascending cellulitis   SKIN: No focal rash         LINE: IV site ok         Scheduled Meds:   glipiZIDE  15 mg Oral QAM AC    vancomycin  1,250 mg Intravenous Q12H    cefepime  2 g Intravenous Q12H    sodium chloride flush  10 mL Intravenous 2 times per day    enoxaparin  40 mg Subcutaneous Daily    insulin lispro  0-12 Units Subcutaneous TID WC    insulin lispro  0-6 Units Subcutaneous Nightly       Continuous Infusions:   dextrose            Data Review:    Lab Results   Component Value Date    WBC 9.0 09/08/2020    HGB 12.6 (L) 09/08/2020    HCT 37.9 (L) 09/08/2020    MCV 89.2 09/08/2020     09/08/2020     Lab Results   Component Value Date    CREATININE 0.9 09/09/2020    BUN 14 09/09/2020     09/09/2020    K 4.7 09/09/2020     09/09/2020    CO2 27 09/09/2020       Hepatic Function Panel:   Lab Results   Component Value Date    ALKPHOS 112 09/02/2020    ALT 18 09/02/2020    AST 24 09/02/2020    PROT 7.7 09/02/2020    BILITOT 1.1 09/02/2020    LABALBU 4.0 09/02/2020         Cultures:   9/2       BC x2 GpBS and Staph aureus mrsa  Antibiotic  Interpretation  HUMERA  Status     ceFAZolin  Resistant  <=4  mcg/mL      clindamycin  Sensitive  <=0.5  mcg/mL erythromycin  Resistant  >4  mcg/mL      oxacillin  Resistant  >2  mcg/mL      tetracycline  Resistant  >8  mcg/mL      trimethoprim-sulfamethoxazole  Sensitive  <=0.5/9.5  mcg/mL      vancomycin  Sensitive  1  mcg/mL        9/3 Wound culture MRSA same HUMERA profile    COVID NAAT neg   9/4 BC x2 neg   Surgical culture foot bone ox-S S epi     Clearance fragment bone pan-S Pseudomonas        Radiology Review:  All pertinent images / reports were reviewed as a part of this visit. Xray L foot 9/2/20   Impression    Suspected bone destruction such as osteomyelitis involving the base of the    1st distal phalanx and lesser degree medial aspect of the adjacent proximal    phalanx across the joint space.  This is new from the prior study. Osteomyelitis primarily considered particularly given the history.         Other findings above.           MRI L foot 9/3/20   Impression    Diffuse soft tissue edema which is most prominent dorsally.  Soft tissue    ulceration along the plantar lateral aspect of the distal foot.         There is subtle mild increased T2 signal noted within the 5th metatarsal head    and the 5th digit.  No corresponding low T1 signal changes.  Findings may be    reactive however concern for early osteomyelitis given adjacent ulceration. TTE 9/9/20   Summary   Left ventricular systolic function is low normal with a visually estimated   ejection fraction of 50-55%. The left ventricle is normal in size with normal wall thickness. No regional wall motion abnormalities are noted. Normal left ventricular diastolic function. No obvious masses, thrombi, or vegetations are noted.     Assessment:     Patient Active Problem List    Diagnosis Date Noted    Receiving intravenous antibiotic treatment as outpatient     Weight loss counseling, encounter for     High fever     Bandemia     Closed displaced fracture of fifth metatarsal bone of right foot     Elevated sed rate     Elevated C-reactive protein (CRP)     Overweight     Failure of outpatient treatment     Diabetic polyneuropathy associated with type 2 diabetes mellitus ([facility]     Osteomyelitis ([facility] 07/07/2019    Right foot infection 07/07/2019    Sepsis ([facility] 07/07/2019    Uncontrolled type 2 diabetes mellitus with hyperglycemia ([facility] 07/07/2019    Hypertension associated with diabetes ([facility] 03/26/2019    Type 2 diabetes mellitus ([facility] 03/26/2019       Uncontrolled DM      Admitted 9/2/20 with sepsis and L foot infection  MRI with suspected OM 5th toe and MT head   S/p 5th ray amp 9/4/20   Pseudomonas isolated in culture of clearance fragment     Secondary bacteremia with GpB and MRSA   Recent BC are negative  Echo reassuring       No abx allergies    Plan:    With MRSA bacteremia and positive culture of clearance fragment, he will need weeks of IV abx after DC     PICC ordered     Continuing vanc and cefepime as ordered x4 weeks from date of foot sgy - end date 10/2/20  PENNY completed     OK for DC     Discussed with patient/family, all questions answered  D/w RN       Bernie Blackmon MD  Phone: [phone]   Fax : [phone]

## 2020-09-09 NOTE — PROGRESS NOTES
Delivered walker to [facility] hospital room.   Thanks for the referral.  Tiffany Metz  9/9/2020

## 2020-09-09 NOTE — DISCHARGE INSTR - COC
Continuity of Care Form    Patient Name: Tenzin Birmingham   :  1965  MRN:  [id number]    Admit date:  2020  Discharge date:  2020    Code Status Order: Full Code   Advance Directives:   [facility] Documentation       Date/Time Healthcare Directive Type of Healthcare Directive Copy in [facility] [address] Agent's Name Healthcare Agent's Phone Number    20 0022  No, patient does not have an advance directive for healthcare treatment -- -- -- -- --            Admitting Physician:  Davie Archer MD  PCP: Federico Velazquez MD, MD    Discharging Nurse: [facility] Unit/Room#: 0203/0203-01  Discharging Unit Phone Number: [phone]    Emergency Contact:   Extended Emergency Contact Information  Primary Emergency Contact: Mick Crockett Phone: [phone]  Mobile Phone: [phone]  Relation: Child  Secondary Emergency Contact: Rand Delong  [place] Phone: [phone]  Relation: Spouse    Past Surgical History:  Past Surgical History:   Procedure Laterality Date    ANKLE SURGERY      FOOT DEBRIDEMENT Right 2019    RIGHT FOOT  INCISION AND DRAINAGE WITH 5TH METATARSAL BONE RESECTION performed by Waldemar Ravi DPM at [address] [place] [address]      right ankle    TOE AMPUTATION Right 2019    incision and drainage right foot with possible partial 5th ray amputation performed by Waldemar Ravi DPM at [facility] TOE AMPUTATION Left 2020    AMPUTATION FIFTH TOE, AND FIFTH METATARSAL HEAD OF LEFT FOOT WITH INCISION AND DEBRIDEMENT ([facility] WITH ANTIBIOTIC, WANTS SAGITTAL SAW) performed by Arpita Abbott DPM at [facility]       Immunization History: There is no immunization history on file for this patient.     Active Problems:  Patient Active Problem List   Diagnosis Code    Osteomyelitis ([address] M86.9    Right foot infection L08.9    Hypertension associated with diabetes ([facility] E11.59, I10    Type 2 diabetes mellitus days: 429       Wound 07/11/19 Heel Left scabbed over wounds x2  (Active)   Number of days: 425       Wound 07/11/19 Leg Left;Lateral Calf - scabbed over healing wound from old surgical incision (Active)   Number of days: 425       Wound 07/11/19 Foot Left scabbed over wound area - bottom of left ball of foot (Active)   Number of days: 425        Elimination:  Continence:   · Bowel: Yes  · Bladder: Yes  Urinary Catheter: None   Colostomy/Ileostomy/Ileal Conduit: No       Date of Last BM: 09/08/2020    Intake/Output Summary (Last 24 hours) at 9/9/2020 1345  Last data filed at 9/9/2020 1120  Gross per 24 hour   Intake 1200 ml   Output 1225 ml   Net -25 ml     I/O last 3 completed shifts: In: 1280 [P.O.:620; I.V.:660]  Out: 1289 [Urine:1550]    Safety Concerns:     None    Impairments/Disabilities:      None    Nutrition Therapy:  Current Nutrition Therapy:   - Oral Diet:  Carb Control 4 carbs/meal (1800kcals/day)    Routes of Feeding: Oral  Liquids: No Restrictions  Daily Fluid Restriction: no  Last Modified Barium Swallow with Video (Video Swallowing Test): not done    Treatments at the Time of Hospital Discharge:   Respiratory Treatments: NA  Oxygen Therapy:  is not on home oxygen therapy.   Ventilator:    - No ventilator support    Rehab Therapies: Physical Therapy and Occupational Therapy  Weight Bearing Status/Restrictions: Non-weight bearing on right leg  Other Medical Equipment (for information only, NOT a DME order):  walker  Other Treatments: NA    Patient's personal belongings (please select all that are sent with patient):  None    RN SIGNATURE:  Electronically signed by Bernie Feliciano RN on 9/9/20 at 4:28 PM EDT    CASE MANAGEMENT/SOCIAL WORK SECTION    Inpatient Status Date: 9/2/20    Readmission Risk Assessment Score:  Readmission Risk              Risk of Unplanned Readmission:        10           Discharging to Facility/ Agency   · Name: [facility]   · Address:  · Phone: [phone] / [phone]  · Fax:    Dialysis Facility (if applicable)   · Name:  · Address:  · Dialysis Schedule:  · Phone:  · Fax:    / signature: Electronically signed by Autumn Katz RN on 9/9/20 at 2:49 PM EDT    PHYSICIAN SECTION    Prognosis: Fair    Condition at Discharge: Stable    Rehab Potential (if transferring to Rehab): Fair    Recommended Labs or Other Treatments After Discharge:     Recommended Follow-up, Labs or Other Treatments After Discharge:      INFUSION ORDERS  Vancomycin 1250mg IV q12 and cefepime 2g IV q8 both continued through 10/2/20  Weekly CBC diff, CMP, CRP faxed to Dr. Alfonzo Lucas at [phone]   Routine PICC care  Call with ID related concerns [phone]   Yamilex Monroy MD                Physician Certification: I certify the above information and transfer of Brian Marroquin  is necessary for the continuing treatment of the diagnosis listed and that he requires [address] for less 30 days.      Update Admission H&P: No change in H&P    PHYSICIAN SIGNATURE:  Electronically signed by Veronica Jackson MD on 9/9/20 at 1:45 PM EDT

## 2020-09-09 NOTE — PROGRESS NOTES
Single Lumen PICC in place in R bacilic, PIV removed from RFA, catheter intact, pt tolerated well, dry dressing in place. Reviewed DC instruction with patient and daughter. 2 RX given to patient, one for narcotic, the other for rolling walker which he has at bedside. Advised pt that IV abx will not be given this evening but to open box and refrigerate as needed, HH will be out tomorrow for IV infusion. Pt wheeled outside by PCA. Tele box off and in nurses station.   Branden Santiago

## 2020-09-09 NOTE — PROGRESS NOTES
Comprehensive Nutrition Assessment    Type and Reason for Visit:  Reassess    Nutrition Recommendations/Plan:   1. Continue carb control diet   2. Encourage PO intakes   3. Encourage diet compliance and healthy lifestyle  4. Monitor nutrition adequacy, pertinent labs, bowel habits, wt changes, and clinical progress    Nutrition Assessment:  Follow up: S/p 5th toe and met head amputation with I&D on 9/4. Pt nutritionally improving AEB good appetite and PO intakes per EMR. Encouraged continued good PO intakes. Discussed A1c results. Encouraged carb counting and consistent carb intakes throughout the day for increased BG control. Pt receptive to information provided. No further nutrition questions or concerns, will continue to monitor. Malnutrition Assessment:  Malnutrition Status: At risk for malnutrition (Comment)      Estimated Daily Nutrient Needs:  Energy (kcal):  2516-6174 kcal; Weight Used for Energy Requirements:  Ideal(72.7 kg)     Protein (g):   g; Weight Used for Protein Requirements:  Ideal(1.2-1.5 g/kg)        Fluid (ml/day):  1 ml/kcal; Weight Used for Fluid Requirements:  [place]      Nutrition Related Findings:  A1c of 9.2 on 9/2/20, active BS, BM x2 on 9/8, trace BLE      Wounds:  (surgical incision (toe amputation))       Current Nutrition Therapies:    DIET CARB CONTROL; Anthropometric Measures:  · Height: 5' 9\" (175.3 cm)  · Current Body Weight: 233 lb (105.7 kg)   · Usual Body Weight: (stated weight hx)     · Ideal Body Weight: 160 lbs; % Ideal Body Weight 145.6 %   · BMI: 34.4  · BMI Categories: Obese Class 1 (BMI 30.0-34. 9)       Nutrition Diagnosis:   · Increased nutrient needs related to increase demand for energy/nutrients as evidenced by wounds      Nutrition Interventions:   Food and/or Nutrient Delivery:  Continue Current Diet  Nutrition Education/Counseling:  Education completed   Coordination of Nutrition Care:  Continued Inpatient Monitoring    Goals:  Pt will consume

## 2020-09-09 NOTE — PLAN OF CARE
Problem: Falls - Risk of:  Goal: Will remain free from falls  Description: Will remain free from falls  Note: Pt will remain free from falls this hospitalization. Non skid socks on, call light and bedside table in reach, wheels locked and beds in lowest position, pt A&O, knows to call for assistance as needed.

## 2020-09-09 NOTE — DISCHARGE SUMMARY
Hospital Medicine Discharge Summary    Patient ID: Fernanda Cordoba      Patient's PCP: Dexter Bray MD, MD    Admit Date: 9/2/2020     Discharge Date:   9/9/2020    Admitting Physician: Alfonzo Jane MD     Discharge Physician: Arturo Schultz MD     Discharge Diagnoses: Active Hospital Problems    Diagnosis    Sepsis ([facility] [A41.9]       The patient was seen and examined on day of discharge and this discharge summary is in conjunction with any daily progress note from day of discharge. Hospital Course:     Evolving sepsis- due to suspected OM of Left foot/ Fifth toe, with fever of 103, tachycardia, lactate wnl, nl bolus given in ER  -bcx pending  -started vanc/zosyn on 9/2, pharm assisted dosing  -ID consulted, apprec assistance on abx  -Podiatry consulted for further recs  -ivfs ordered(100/hr x 12hrs), monitor. MRI showing left fifth toe early changes of OM, swelling. Blood cultures positive for Strep, ID and Pod input noted. Continue Zosyn and Zyvox as per ID.     S/p Surgery done on 9/4/2020-Procedure(s):  AMPUTATION FIFTH TOE, AND FIFTH METATARSAL HEAD OF LEFT FOOT WITH INCISION AND DEBRIDEMENT (PULSEVAC WITH ANTIBIOTIC, WANTS SAGITTAL SAW)     Plan for IV antibiotic as outpatient, plan to get PICC line, continuing cefepime and vancomycin as per ID. 2D echo has been ordered to rule out SBE, it is pending.        DM2- uncontrolled  -a1c -9.2  Ac/hs bs  Med SSI  -on glipzide at home, continued     BPH- per emr, was on flomax and proscar but not taking(restart if needed)          Physical Exam Performed:     /64   Pulse 62   Temp 97.9 °F (36.6 °C) (Oral)   Resp 16   Ht 5' 9\" (1.753 m)   Wt 233 lb 1 oz (105.7 kg)   SpO2 96%   BMI 34.42 kg/m²       General appearance:  No apparent distress, appears stated age and cooperative. HEENT:  Normal cephalic, atraumatic without obvious deformity. Pupils equal, round, and reactive to light. Extra ocular muscles intact.  Conjunctivae/corneas clear.  Neck: Supple, with full range of motion. No jugular venous distention. Trachea midline. Respiratory:  Normal respiratory effort. Clear to auscultation, bilaterally without Rales/Wheezes/Rhonchi. Cardiovascular:  Regular rate and rhythm with normal S1/S2 without murmurs, rubs or gallops. Abdomen: Soft, non-tender, non-distended with normal bowel sounds. Musculoskeletal:  No clubbing, cyanosis or edema bilaterally. Full range of motion without deformity. Skin: Skin color, texture, turgor normal.  No rashes or lesions. Neurologic:  Neurovascularly intact without any focal sensory/motor deficits. Cranial nerves: II-XII intact, grossly non-focal.  Psychiatric:  Alert and oriented, thought content appropriate, normal insight  Capillary Refill: Brisk,< 3 seconds   Peripheral Pulses: +2 palpable, equal bilaterally       Labs: For convenience and continuity at follow-up the following most recent labs are provided:      CBC:    Lab Results   Component Value Date    WBC 9.0 09/08/2020    HGB 12.6 09/08/2020    HCT 37.9 09/08/2020     09/08/2020       Renal:    Lab Results   Component Value Date     09/09/2020    K 4.7 09/09/2020     09/09/2020    CO2 27 09/09/2020    BUN 14 09/09/2020    CREATININE 0.9 09/09/2020    CALCIUM 9.5 09/09/2020         Significant Diagnostic Studies    Radiology:   MRI FOOT LEFT W WO CONTRAST   Final Result   Diffuse soft tissue edema which is most prominent dorsally. Soft tissue   ulceration along the plantar lateral aspect of the distal foot. There is subtle mild increased T2 signal noted within the 5th metatarsal head   and the 5th digit. No corresponding low T1 signal changes. Findings may be   reactive however concern for early osteomyelitis given adjacent ulceration.          XR FOOT LEFT (MIN 3 VIEWS)   Final Result   Suspected bone destruction such as osteomyelitis involving the base of the   1st distal phalanx and lesser degree medial aspect of the adjacent proximal   phalanx across the joint space. This is new from the prior study. Osteomyelitis primarily considered particularly given the history. Other findings above. XR CHEST PORTABLE   Final Result   Unremarkable portable chest radiograph. IR PICC WO SQ PORT/PUMP > 5 YEARS    (Results Pending)          Consults:     IP CONSULT TO HOSPITALIST  IP CONSULT TO PODIATRY  IP CONSULT TO PHARMACY  IP CONSULT TO INFECTIOUS DISEASES  IP CONSULT TO PODIATRY  IP CONSULT TO HOME CARE NEEDS    Disposition:  Home     Condition at Discharge: Stable    Discharge Instructions/Follow-up:  Podiatry    Code Status:  Full Code     Activity: activity as tolerated    Diet: diabetic diet      Discharge Medications:     Current Discharge Medication List           Details   finasteride (PROSCAR) 5 MG tablet Take 1 tablet by mouth daily  Qty: 30 tablet, Refills: 3      tamsulosin (FLOMAX) 0.4 MG capsule Take 1 capsule by mouth daily  Qty: 30 capsule, Refills: 3      glipiZIDE (GLUCOTROL) 5 MG tablet Take 15 mg by mouth daily as needed      cyclobenzaprine (FLEXERIL) 10 MG tablet Take 10 mg by mouth 3 times daily as needed             Time Spent on discharge is more than 30 minutes in the examination, evaluation, counseling and review of medications and discharge plan. Signed:    Misael Perales MD   9/9/2020      Thank you Amanda Israel MD, MD for the opportunity to be involved in this patient's care. If you have any questions or concerns please feel free to contact me at [phone].

## 2020-09-09 NOTE — CARE COORDINATION
Call received from Kyrie Gillis at [facility] with benefits. She states that pt has a $50/day OOP cost until he reaches his deductible of $6,000. He has already met $4,468 of that prior to this hospital stay and may not owe anything after this stay is billed. She states that meds will be delivered to pt home tonight for [facility] tomorrow. ID does need to place orders in [address] [place] [address] so that CM can fax them to [facility] today. Primary RN aware and will attempt to contact Dr. Su Meyers. CM also spoke with [facility] from [email or web address] who confirms that they are able to accept pt at [place]; CM faxed all requested information to [phone]. Discussed with pt via room phone who is aware and agreeable to POC. Will follow.     Ruby Larson RN

## 2020-09-11 NOTE — ADT AUTH CERT
9/9 Echo result by Daja Morris RN         Review Status  Review Entered    In Primary  9/11/2020 10:13        Criteria Review        Left Ventricle   Left ventricular systolic function is low normal with a visually estimated   ejection fraction of 50-55%.   The left ventricle is normal in size with normal wall thickness.   No regional wall motion abnormalities are noted.   Normal left ventricular diastolic function.   There is no evidence of a left ventricular thrombus.      Mitral Valve   The mitral valve appears structurally normal.   Trace mitral regurgitation.      Left Atrium   The left atrium is normal in size.      Aortic Valve   The aortic valve appears normal in structure and function.   There is no evidence of aortic regurgitation.      Aorta   The aortic root is normal in size.      Right Ventricle   The right ventricle is normal in size and function.   TAPSE is measured at 23 mm.   S' prime velocity is measured at 12.1 cm/s.      Tricuspid Valve   The tricuspid valve is normal in structure.   Trace tricuspid regurgitation.   Inadequate tricuspid valve regurgitation to estimate systolic pulmonary   artery pressure.      Right Atrium   The right atrium is normal in size.   Right atrial area is 13.8 cm2.      Pulmonic Valve   The pulmonic valve leaflets are not well visualized.   No evidence of pulmonic regurgitation.      Pericardial Effusion   No pericardial effusion noted.      Pleural Effusion   No pleural effusion noted.      Miscellaneous   The IVC is not well visualized.   No obvious masses, thrombi, or vegetations are noted.     M-Mode/2D Measurements (cm)      LV Diastolic Dimension: 6.92 cm LV Systolic Dimension: 9.50 cm   LV Septum Diastolic: 7.38 cm   LV PW Diastolic: 1.27 cm        AO Root Dimension: 3.6 cm                                   AV Cusp Separation: 2.3 cm                                   LA Dimension: 4.1 cm   LVOT: 2.4 cm                    LA Area: 19 cm2                                   LA volume/Index: 64 ml /29 ml/m2     Doppler Measurements      AV Peak Velocity: 103 cm/s     MV Peak E-Wave: 71.1 cm/s   AV Peak Gradient: 4.24 mmHg    MV Peak A-Wave: 60.2 cm/s   LVOT Peak Velocity: 82.9 cm/s  MV E/A Ratio: 1.18      Estimated RAP:8 mmHg   E' Septal Velocity: 6.36 cm/s   E' Lateral Velocity: 10.7 cm/s   E/E' ratio: 8.9      Aortic Valve      Peak Velocity: 103 cm/s   Peak Gradient: 4.24 mmHg      Cusp Separation: 2.3 cm     Aorta      Aortic Root: 3.6 cm   LVOT Diameter: 2.4 cm          Osteomyelitis - Care Day 7 (9/8/2020) by Meggan Alcaraz RN         Review Status  Review Entered    Completed  9/11/2020 10:11        Criteria Review       Care Day: 7 Care Date: 9/8/2020 Level of Care:    Guideline Day 4    Level Of Care    (X) Complete discharge planning    Clinical Status    (X) * Hemodynamic stability    9/11/2020 10:11 AM EDT by Elida Benitez      wbc 9.0    (X) * Afebrile    (X) * Most recent blood culture negative    (X) * Usual diet tolerated    (X) * Home antibiotic regimen arranged    9/11/2020 10:11 AM EDT by Elida Benitez      per ID, PICC outpt iv abx    (X) * Need for inpatient surgical management absent    ( ) * Discharge plans and education understood    Routes    (X) * Oral hydration, medications, [D] and diet    9/11/2020 10:11 AM EDT by Nick Manning is given x3 doses    (X) Usual diet    9/11/2020 10:11 AM EDT by Elida Benitez      carb cont   glucotrol 15mg qd    Interventions    (X) Possible physical therapy    Medications    (X) Parenteral antibiotics    9/11/2020 10:11 AM EDT by Elida Benitez      vanco 1250mg iv q12hrs, cefepime 2gm iv q12hrs    * Milestone    Additional Notes    9/8       Per ID:    With MRSA bacteremia and positive culture of clearance fragment, he will need weeks of IV abx after DC    OK for PICC placement    Continuing vanc and cefepime as ordered    Await echo result         DC planning underway           Osteomyelitis - Care Day 6 (9/7/2020) by Iliana Isbell RN         Review Status  Review Entered    Completed  9/11/2020 10:08        Criteria Review       Care Day: 6 Care Date: 9/7/2020 Level of Care:    Guideline Day 4    Clinical Status    ( ) * Hemodynamic stability    9/11/2020 10:08 AM EDT by Mala Gómez      61  16  140/71  97%ra     rates pain 7/10    (X) * Afebrile    9/11/2020 10:08 AM EDT by Mala Gómez      97.5    (X) * Most recent blood culture negative    (X) * Usual diet tolerated    ( ) * Home antibiotic regimen arranged    ( ) * Need for inpatient surgical management absent    ( ) * Discharge plans and education understood    Routes    ( ) * Oral hydration, medications, [D] and diet    9/11/2020 10:08 AM EDT by Annette Mediccande is given    (X) Usual diet    9/11/2020 10:08 AM EDT by Mala Gómez      glucotrol 15mg qd   carb cont diet    Interventions    (X) Possible physical therapy    9/11/2020 10:08 AM EDT by Mala Gómez      PT eval am pac score is 19/24    Medications    (X) Parenteral antibiotics    9/11/2020 10:08 AM EDT by Mala Gómez      cefepime 2gm iv q12hrs  vanco 1250mg iv q12hrs    * Milestone    Additional Notes    9/7       Plan for IV antibiotic as outpatient, plan to get PICC line, continuing cefepime and vancomycin as per ID.     2D echo has been ordered to rule out SBE, it is pending.        Osteomyelitis - Care Day 4 (9/5/2020) by Iliana Isbell RN         Review Status  Review Entered    Completed  9/11/2020 09:44        Criteria Review       Care Day: 4 Care Date: 9/5/2020 Level of Care:    Guideline Day 4    Clinical Status    ( ) * Hemodynamic stability    9/11/2020 9:44 AM EDT by Mala Gómez      pod# 1 left 5th toe and 5th metatarsal head resection    128/73  65  17  95%    (X) * Afebrile    9/11/2020 9:44 AM EDT by Mala Gómez      98.4    (X) * Most recent blood culture negative    9/11/2020 9:44 AM EDT by Mala Gómez      surgical cx pend    (X) * Usual diet tolerated    ( ) * Home antibiotic regimen arranged    ( ) * Need for inpatient surgical management absent    ( ) * Discharge plans and education understood    Routes    ( ) * Oral hydration, medications, [D] and diet    9/11/2020 9:44 AM EDT by Jasmin Wong is given x2 and percocet is given x2 doses    (X) Usual diet    Medications    (X) Parenteral antibiotics    9/11/2020 9:44 AM EDT by Jamilah Hope      zyvox 600mg iv q12hrs, zosyn 3.365 g iv q8hrs    * Milestone    Additional Notes    9/5       Per Podiatrist    POD#1    Dressing intact to left foot with no active bleeding noted.  Ulcer draining serous fluid and blanchable erythema has decreased some from dorsum of left foot. + pain with dressing change.

## 2020-09-15 NOTE — OP NOTE
metatarsal, but an additional flushing with a  pulse lavage was completed and then we took an additional fragment from  the fifth metatarsal and sent it for culture. All bleeders were  cauterized and ligated as necessary due to the fact that there was no  residual purulence or increased signs of infection. The subcutaneous  and deep tissues were approximated and the wound was closed. I did  apply Xeroform to the plantar ulceration and a Xeroform to the dorsal  incision. A dry sterile dressing was then applied. The patient's  pneumatic ankle tourniquet was deflated, prompt hyperemic response was  noted to all digits of the left foot. The patient tolerated the  procedure well. He had returned to the operating room with continued IV  antibiotics, for which he is being seen by Infectious Disease.         Chemo Giraldo DPM    D: 09/15/2020 8:58:59       T: 09/15/2020 9:06:28     [email or web address]  Job#: [id number]     Doc#: [id number]    CC:

## 2020-09-17 ENCOUNTER — HOSPITAL ENCOUNTER (OUTPATIENT)
Age: 55
Setting detail: SPECIMEN
Discharge: HOME | End: 2020-09-17
Payer: COMMERCIAL

## 2020-09-17 LAB
A/G RATIO: 1.1 (ref 1.1–2.2)
ALBUMIN SERPL-MCNC: 3.7 G/DL (ref 3.4–5)
ALP BLD-CCNC: 92 U/L (ref 40–129)
ALT SERPL-CCNC: 11 U/L (ref 10–40)
ANION GAP SERPL CALCULATED.3IONS-SCNC: 9 MMOL/L (ref 3–16)
AST SERPL-CCNC: 14 U/L (ref 15–37)
BASOPHILS ABSOLUTE: 0.1 K/UL (ref 0–0.2)
BASOPHILS RELATIVE PERCENT: 1.3 %
BILIRUB SERPL-MCNC: 0.4 MG/DL (ref 0–1)
BUN BLDV-MCNC: 15 MG/DL (ref 7–20)
C-REACTIVE PROTEIN: 9.7 MG/L (ref 0–5.1)
CALCIUM SERPL-MCNC: 9.1 MG/DL (ref 8.3–10.6)
CHLORIDE BLD-SCNC: 105 MMOL/L (ref 99–110)
CO2: 24 MMOL/L (ref 21–32)
CREAT SERPL-MCNC: 0.9 MG/DL (ref 0.9–1.3)
EOSINOPHILS ABSOLUTE: 0.4 K/UL (ref 0–0.6)
EOSINOPHILS RELATIVE PERCENT: 4.8 %
GFR AFRICAN AMERICAN: >60
GFR NON-AFRICAN AMERICAN: >60
GLOBULIN: 3.3 G/DL
GLUCOSE BLD-MCNC: 184 MG/DL (ref 70–99)
HCT VFR BLD CALC: 37.4 % (ref 40.5–52.5)
HEMOGLOBIN: 12.6 G/DL (ref 13.5–17.5)
LYMPHOCYTES ABSOLUTE: 2 K/UL (ref 1–5.1)
LYMPHOCYTES RELATIVE PERCENT: 23.4 %
MCH RBC QN AUTO: 30.5 PG (ref 26–34)
MCHC RBC AUTO-ENTMCNC: 33.8 G/DL (ref 31–36)
MCV RBC AUTO: 90.2 FL (ref 80–100)
MONOCYTES ABSOLUTE: 0.5 K/UL (ref 0–1.3)
MONOCYTES RELATIVE PERCENT: 6.5 %
NEUTROPHILS ABSOLUTE: 5.3 K/UL (ref 1.7–7.7)
NEUTROPHILS RELATIVE PERCENT: 64 %
PDW BLD-RTO: 13.6 % (ref 12.4–15.4)
PLATELET # BLD: 326 K/UL (ref 135–450)
PMV BLD AUTO: 7.7 FL (ref 5–10.5)
POTASSIUM SERPL-SCNC: 4.8 MMOL/L (ref 3.5–5.1)
RBC # BLD: 4.15 M/UL (ref 4.2–5.9)
SODIUM BLD-SCNC: 138 MMOL/L (ref 136–145)
TOTAL PROTEIN: 7 G/DL (ref 6.4–8.2)
WBC # BLD: 8.3 K/UL (ref 4–11)

## 2020-09-17 PROCEDURE — 80053 COMPREHEN METABOLIC PANEL: CPT

## 2020-09-17 PROCEDURE — 86140 C-REACTIVE PROTEIN: CPT

## 2020-09-17 PROCEDURE — 85025 COMPLETE CBC W/AUTO DIFF WBC: CPT

## 2020-09-17 PROCEDURE — 36415 COLL VENOUS BLD VENIPUNCTURE: CPT

## 2020-09-18 ENCOUNTER — HOSPITAL ENCOUNTER (OUTPATIENT)
Age: 55
Setting detail: SPECIMEN
Discharge: HOME | End: 2020-09-18
Payer: COMMERCIAL

## 2020-09-18 LAB — VANCOMYCIN TROUGH: 18 UG/ML (ref 10–20)

## 2020-09-18 PROCEDURE — 36415 COLL VENOUS BLD VENIPUNCTURE: CPT

## 2020-09-18 PROCEDURE — 80202 ASSAY OF VANCOMYCIN: CPT

## 2020-09-21 ENCOUNTER — TELEPHONE (OUTPATIENT)
Dept: INFECTIOUS DISEASES | Age: 55
End: 2020-09-21

## 2020-09-21 NOTE — TELEPHONE ENCOUNTER
Elodia Castleman called in regards to patient reporting PICC line is causing pain from insertion site and up the arm. Patient complains that pain has been going on for couple days. Patient is stating it takes longer than the 1.5hrs stated for the ball infusion to infuse.

## 2020-09-21 NOTE — TELEPHONE ENCOUNTER
Called Jake Eden (his friend)    Pain in the PICC arm  Vanc infusing slowly   Redness at insertion site, bleeding at the site for several days   Pain with infusion   No fever    Will get new PICC line     Maia - can you arrange for new PICC?     If it can't be done tomorrow, I will need to order po abx    Thanks

## 2020-09-22 ENCOUNTER — HOSPITAL ENCOUNTER (OUTPATIENT)
Dept: INTERVENTIONAL RADIOLOGY/VASCULAR | Age: 55
Discharge: HOME | End: 2020-09-22
Payer: COMMERCIAL

## 2020-09-22 PROCEDURE — C1751 CATH, INF, PER/CENT/MIDLINE: HCPCS

## 2020-09-22 PROCEDURE — 36573 INSJ PICC RS&I 5 YR+: CPT

## 2020-09-24 ENCOUNTER — TELEPHONE (OUTPATIENT)
Dept: INFECTIOUS DISEASES | Age: 55
End: 2020-09-24

## 2020-09-24 ENCOUNTER — HOSPITAL ENCOUNTER (OUTPATIENT)
Age: 55
Setting detail: SPECIMEN
Discharge: HOME | End: 2020-09-24
Payer: COMMERCIAL

## 2020-09-24 LAB
A/G RATIO: 1.3 (ref 1.1–2.2)
ALBUMIN SERPL-MCNC: 3.8 G/DL (ref 3.4–5)
ALP BLD-CCNC: 89 U/L (ref 40–129)
ALT SERPL-CCNC: 9 U/L (ref 10–40)
ANION GAP SERPL CALCULATED.3IONS-SCNC: 10 MMOL/L (ref 3–16)
AST SERPL-CCNC: 13 U/L (ref 15–37)
BASOPHILS ABSOLUTE: 0.1 K/UL (ref 0–0.2)
BASOPHILS RELATIVE PERCENT: 1 %
BILIRUB SERPL-MCNC: 0.4 MG/DL (ref 0–1)
BUN BLDV-MCNC: 14 MG/DL (ref 7–20)
C-REACTIVE PROTEIN: 8.2 MG/L (ref 0–5.1)
CALCIUM SERPL-MCNC: 9.3 MG/DL (ref 8.3–10.6)
CHLORIDE BLD-SCNC: 103 MMOL/L (ref 99–110)
CO2: 25 MMOL/L (ref 21–32)
CREAT SERPL-MCNC: 0.8 MG/DL (ref 0.9–1.3)
EOSINOPHILS ABSOLUTE: 0.4 K/UL (ref 0–0.6)
EOSINOPHILS RELATIVE PERCENT: 6.1 %
GFR AFRICAN AMERICAN: >60
GFR NON-AFRICAN AMERICAN: >60
GLOBULIN: 3 G/DL
GLUCOSE BLD-MCNC: 121 MG/DL (ref 70–99)
HCT VFR BLD CALC: 36.9 % (ref 40.5–52.5)
HEMOGLOBIN: 12.2 G/DL (ref 13.5–17.5)
LYMPHOCYTES ABSOLUTE: 2.1 K/UL (ref 1–5.1)
LYMPHOCYTES RELATIVE PERCENT: 31 %
MCH RBC QN AUTO: 29.9 PG (ref 26–34)
MCHC RBC AUTO-ENTMCNC: 33.2 G/DL (ref 31–36)
MCV RBC AUTO: 90 FL (ref 80–100)
MONOCYTES ABSOLUTE: 0.5 K/UL (ref 0–1.3)
MONOCYTES RELATIVE PERCENT: 7.6 %
NEUTROPHILS ABSOLUTE: 3.6 K/UL (ref 1.7–7.7)
NEUTROPHILS RELATIVE PERCENT: 54.3 %
PDW BLD-RTO: 13.6 % (ref 12.4–15.4)
PLATELET # BLD: 219 K/UL (ref 135–450)
PMV BLD AUTO: 8.3 FL (ref 5–10.5)
POTASSIUM SERPL-SCNC: 4.3 MMOL/L (ref 3.5–5.1)
RBC # BLD: 4.1 M/UL (ref 4.2–5.9)
SODIUM BLD-SCNC: 138 MMOL/L (ref 136–145)
TOTAL PROTEIN: 6.8 G/DL (ref 6.4–8.2)
VANCOMYCIN TROUGH: 20.5 UG/ML (ref 10–20)
WBC # BLD: 6.6 K/UL (ref 4–11)

## 2020-09-24 PROCEDURE — 85025 COMPLETE CBC W/AUTO DIFF WBC: CPT

## 2020-09-24 PROCEDURE — 80053 COMPREHEN METABOLIC PANEL: CPT

## 2020-09-24 PROCEDURE — 86140 C-REACTIVE PROTEIN: CPT

## 2020-09-24 PROCEDURE — 36415 COLL VENOUS BLD VENIPUNCTURE: CPT

## 2020-09-24 PROCEDURE — 80202 ASSAY OF VANCOMYCIN: CPT

## 2020-09-24 NOTE — TELEPHONE ENCOUNTER
Patients VT was 20.5. Verified a true trough. Patient is currently on 1250mg Q12hr Vancomycin. Bun and creatinine WNL.

## 2020-09-28 ENCOUNTER — HOSPITAL ENCOUNTER (OUTPATIENT)
Age: 55
Setting detail: SPECIMEN
Discharge: HOME | End: 2020-09-28
Payer: COMMERCIAL

## 2020-09-28 LAB — VANCOMYCIN TROUGH: 22.4 UG/ML (ref 10–20)

## 2020-09-28 PROCEDURE — 80202 ASSAY OF VANCOMYCIN: CPT

## 2020-09-28 PROCEDURE — 36415 COLL VENOUS BLD VENIPUNCTURE: CPT

## 2020-09-28 NOTE — TELEPHONE ENCOUNTER
Called to check on redraw of VT. Spoke with [facility], she reports she will call nurse to see where labs were sent and will call office back.

## 2020-09-28 NOTE — TELEPHONE ENCOUNTER
Spoke with Isacc De Jesus with [facility] and she reports after speaking with [facility] and patient, the redraw was not done. Isacc De Jesus reported she is having nurse get labs today. Patient has been giving the current dose all weekend.

## 2020-09-29 ENCOUNTER — TELEPHONE (OUTPATIENT)
Dept: INFECTIOUS DISEASES | Age: 55
End: 2020-09-29

## 2020-10-01 ENCOUNTER — HOSPITAL ENCOUNTER (OUTPATIENT)
Age: 55
Setting detail: SPECIMEN
Discharge: HOME | End: 2020-10-01
Payer: COMMERCIAL

## 2020-10-01 LAB
A/G RATIO: 1.3 (ref 1.1–2.2)
ALBUMIN SERPL-MCNC: 3.9 G/DL (ref 3.4–5)
ALP BLD-CCNC: 96 U/L (ref 40–129)
ALT SERPL-CCNC: 9 U/L (ref 10–40)
ANION GAP SERPL CALCULATED.3IONS-SCNC: 9 MMOL/L (ref 3–16)
AST SERPL-CCNC: 12 U/L (ref 15–37)
BASOPHILS ABSOLUTE: 0.1 K/UL (ref 0–0.2)
BASOPHILS RELATIVE PERCENT: 1 %
BILIRUB SERPL-MCNC: 0.6 MG/DL (ref 0–1)
BUN BLDV-MCNC: 18 MG/DL (ref 7–20)
C-REACTIVE PROTEIN: 9.3 MG/L (ref 0–5.1)
CALCIUM SERPL-MCNC: 9.5 MG/DL (ref 8.3–10.6)
CHLORIDE BLD-SCNC: 106 MMOL/L (ref 99–110)
CO2: 24 MMOL/L (ref 21–32)
CREAT SERPL-MCNC: 0.8 MG/DL (ref 0.9–1.3)
EOSINOPHILS ABSOLUTE: 0.5 K/UL (ref 0–0.6)
EOSINOPHILS RELATIVE PERCENT: 5.9 %
GFR AFRICAN AMERICAN: >60
GFR NON-AFRICAN AMERICAN: >60
GLOBULIN: 3.1 G/DL
GLUCOSE BLD-MCNC: 129 MG/DL (ref 70–99)
HCT VFR BLD CALC: 37.6 % (ref 40.5–52.5)
HEMOGLOBIN: 12.6 G/DL (ref 13.5–17.5)
LYMPHOCYTES ABSOLUTE: 2.1 K/UL (ref 1–5.1)
LYMPHOCYTES RELATIVE PERCENT: 26.9 %
MCH RBC QN AUTO: 30.3 PG (ref 26–34)
MCHC RBC AUTO-ENTMCNC: 33.6 G/DL (ref 31–36)
MCV RBC AUTO: 90.2 FL (ref 80–100)
MONOCYTES ABSOLUTE: 0.6 K/UL (ref 0–1.3)
MONOCYTES RELATIVE PERCENT: 7.5 %
NEUTROPHILS ABSOLUTE: 4.5 K/UL (ref 1.7–7.7)
NEUTROPHILS RELATIVE PERCENT: 58.7 %
PDW BLD-RTO: 13.9 % (ref 12.4–15.4)
PLATELET # BLD: 192 K/UL (ref 135–450)
PMV BLD AUTO: 8.5 FL (ref 5–10.5)
POTASSIUM SERPL-SCNC: 4.3 MMOL/L (ref 3.5–5.1)
RBC # BLD: 4.16 M/UL (ref 4.2–5.9)
SODIUM BLD-SCNC: 139 MMOL/L (ref 136–145)
TOTAL PROTEIN: 7 G/DL (ref 6.4–8.2)
VANCOMYCIN TROUGH: 22.3 UG/ML (ref 10–20)
WBC # BLD: 7.7 K/UL (ref 4–11)

## 2020-10-01 PROCEDURE — 36415 COLL VENOUS BLD VENIPUNCTURE: CPT

## 2020-10-01 PROCEDURE — 86140 C-REACTIVE PROTEIN: CPT

## 2020-10-01 PROCEDURE — 80202 ASSAY OF VANCOMYCIN: CPT

## 2020-10-01 PROCEDURE — 85025 COMPLETE CBC W/AUTO DIFF WBC: CPT

## 2020-10-01 PROCEDURE — 80053 COMPREHEN METABOLIC PANEL: CPT

## 2020-10-02 ENCOUNTER — TELEPHONE (OUTPATIENT)
Dept: INFECTIOUS DISEASES | Age: 55
End: 2020-10-02

## 2020-10-02 NOTE — TELEPHONE ENCOUNTER
Spoke with Tammy @ [facility] and orders given as noted per Dr. Matt Barney. Patient informed and to call if any concerns. [facility] informed as well.

## 2020-10-02 NOTE — TELEPHONE ENCOUNTER
Thanks    End IV abx as planned, central line can be removed  No more abx indicated as long as he continues to improve, f/u with me with concerns

## 2020-10-02 NOTE — TELEPHONE ENCOUNTER
Pt with DFI and bacteremia -    Admitted 9/2/20 with sepsis and L foot infection  MRI with suspected OM 5th toe and MT head   S/p 5th ray amp 9/4/20   Pseudomonas isolated in culture of clearance fragment   Secondary bacteremia with GpB and MRSA     Completing extended course IV vanc and cefepime    Labs reviewed, reassuring    Planned end date 10/2/20     Can you please call patient and find out how his foot is doing?

## 2020-10-02 NOTE — TELEPHONE ENCOUNTER
Patient states his L foot is doing much better, has no complaints. No redness, swelling, pain or fevers.

## 2020-12-04 ENCOUNTER — APPOINTMENT (OUTPATIENT)
Dept: CT IMAGING | Age: 55
DRG: 115 | End: 2020-12-04
Payer: COMMERCIAL

## 2020-12-04 ENCOUNTER — HOSPITAL ENCOUNTER (INPATIENT)
Age: 55
LOS: 3 days | Discharge: HOME | DRG: 115 | End: 2020-12-07
Attending: EMERGENCY MEDICINE | Admitting: INTERNAL MEDICINE
Payer: COMMERCIAL

## 2020-12-04 PROBLEM — J34.0 NASAL ABSCESS: Status: ACTIVE | Noted: 2020-12-04

## 2020-12-04 LAB
A/G RATIO: 1 (ref 1.1–2.2)
ALBUMIN SERPL-MCNC: 3.8 G/DL (ref 3.4–5)
ALP BLD-CCNC: 121 U/L (ref 40–129)
ALT SERPL-CCNC: 14 U/L (ref 10–40)
ANION GAP SERPL CALCULATED.3IONS-SCNC: 9 MMOL/L (ref 3–16)
AST SERPL-CCNC: 13 U/L (ref 15–37)
BASOPHILS ABSOLUTE: 0.1 K/UL (ref 0–0.2)
BASOPHILS RELATIVE PERCENT: 0.8 %
BILIRUB SERPL-MCNC: 0.4 MG/DL (ref 0–1)
BUN BLDV-MCNC: 22 MG/DL (ref 7–20)
CALCIUM SERPL-MCNC: 9.7 MG/DL (ref 8.3–10.6)
CHLORIDE BLD-SCNC: 101 MMOL/L (ref 99–110)
CO2: 27 MMOL/L (ref 21–32)
CREAT SERPL-MCNC: 1 MG/DL (ref 0.9–1.3)
EOSINOPHILS ABSOLUTE: 0.2 K/UL (ref 0–0.6)
EOSINOPHILS RELATIVE PERCENT: 2 %
GFR AFRICAN AMERICAN: >60
GFR NON-AFRICAN AMERICAN: >60
GLOBULIN: 4 G/DL
GLUCOSE BLD-MCNC: 243 MG/DL (ref 70–99)
HCT VFR BLD CALC: 42.5 % (ref 40.5–52.5)
HEMOGLOBIN: 14 G/DL (ref 13.5–17.5)
LACTIC ACID, SEPSIS: 0.8 MMOL/L (ref 0.4–1.9)
LYMPHOCYTES ABSOLUTE: 1.8 K/UL (ref 1–5.1)
LYMPHOCYTES RELATIVE PERCENT: 15.9 %
MCH RBC QN AUTO: 29 PG (ref 26–34)
MCHC RBC AUTO-ENTMCNC: 32.9 G/DL (ref 31–36)
MCV RBC AUTO: 88.1 FL (ref 80–100)
MONOCYTES ABSOLUTE: 0.8 K/UL (ref 0–1.3)
MONOCYTES RELATIVE PERCENT: 7 %
NEUTROPHILS ABSOLUTE: 8.4 K/UL (ref 1.7–7.7)
NEUTROPHILS RELATIVE PERCENT: 74.3 %
PDW BLD-RTO: 13.4 % (ref 12.4–15.4)
PLATELET # BLD: 322 K/UL (ref 135–450)
PMV BLD AUTO: 7.7 FL (ref 5–10.5)
POTASSIUM SERPL-SCNC: 5.2 MMOL/L (ref 3.5–5.1)
RBC # BLD: 4.83 M/UL (ref 4.2–5.9)
SODIUM BLD-SCNC: 137 MMOL/L (ref 136–145)
TOTAL PROTEIN: 7.8 G/DL (ref 6.4–8.2)
WBC # BLD: 11.3 K/UL (ref 4–11)

## 2020-12-04 PROCEDURE — 87077 CULTURE AEROBIC IDENTIFY: CPT

## 2020-12-04 PROCEDURE — 2580000003 HC RX 258: Performed by: INTERNAL MEDICINE

## 2020-12-04 PROCEDURE — 86403 PARTICLE AGGLUT ANTBDY SCRN: CPT

## 2020-12-04 PROCEDURE — 87186 SC STD MICRODIL/AGAR DIL: CPT

## 2020-12-04 PROCEDURE — 6370000000 HC RX 637 (ALT 250 FOR IP): Performed by: NURSE PRACTITIONER

## 2020-12-04 PROCEDURE — 6370000000 HC RX 637 (ALT 250 FOR IP): Performed by: EMERGENCY MEDICINE

## 2020-12-04 PROCEDURE — 85025 COMPLETE CBC W/AUTO DIFF WBC: CPT

## 2020-12-04 PROCEDURE — 87040 BLOOD CULTURE FOR BACTERIA: CPT

## 2020-12-04 PROCEDURE — 6360000002 HC RX W HCPCS: Performed by: INTERNAL MEDICINE

## 2020-12-04 PROCEDURE — 6370000000 HC RX 637 (ALT 250 FOR IP): Performed by: INTERNAL MEDICINE

## 2020-12-04 PROCEDURE — 6360000004 HC RX CONTRAST MEDICATION: Performed by: EMERGENCY MEDICINE

## 2020-12-04 PROCEDURE — 87070 CULTURE OTHR SPECIMN AEROBIC: CPT

## 2020-12-04 PROCEDURE — 80053 COMPREHEN METABOLIC PANEL: CPT

## 2020-12-04 PROCEDURE — 83036 HEMOGLOBIN GLYCOSYLATED A1C: CPT

## 2020-12-04 PROCEDURE — 83605 ASSAY OF LACTIC ACID: CPT

## 2020-12-04 PROCEDURE — 70487 CT MAXILLOFACIAL W/DYE: CPT

## 2020-12-04 PROCEDURE — 96365 THER/PROPH/DIAG IV INF INIT: CPT

## 2020-12-04 PROCEDURE — 87075 CULTR BACTERIA EXCEPT BLOOD: CPT

## 2020-12-04 PROCEDURE — 87205 SMEAR GRAM STAIN: CPT

## 2020-12-04 PROCEDURE — 99253 IP/OBS CNSLTJ NEW/EST LOW 45: CPT | Performed by: OTOLARYNGOLOGY

## 2020-12-04 PROCEDURE — 99284 EMERGENCY DEPT VISIT MOD MDM: CPT

## 2020-12-04 PROCEDURE — 1200000000 HC SEMI PRIVATE

## 2020-12-04 PROCEDURE — 2500000003 HC RX 250 WO HCPCS: Performed by: EMERGENCY MEDICINE

## 2020-12-04 RX ORDER — DEXTROSE MONOHYDRATE 50 MG/ML
100 INJECTION, SOLUTION INTRAVENOUS PRN
Status: DISCONTINUED | OUTPATIENT
Start: 2020-12-04 | End: 2020-12-07 | Stop reason: HOSPADM

## 2020-12-04 RX ORDER — POLYETHYLENE GLYCOL 3350 17 G/17G
17 POWDER, FOR SOLUTION ORAL DAILY PRN
Status: DISCONTINUED | OUTPATIENT
Start: 2020-12-04 | End: 2020-12-07 | Stop reason: HOSPADM

## 2020-12-04 RX ORDER — FINASTERIDE 5 MG/1
5 TABLET, FILM COATED ORAL DAILY
Status: DISCONTINUED | OUTPATIENT
Start: 2020-12-04 | End: 2020-12-04

## 2020-12-04 RX ORDER — GLIPIZIDE 5 MG/1
10 TABLET ORAL
Status: DISCONTINUED | OUTPATIENT
Start: 2020-12-05 | End: 2020-12-07 | Stop reason: HOSPADM

## 2020-12-04 RX ORDER — DEXTROSE MONOHYDRATE 25 G/50ML
12.5 INJECTION, SOLUTION INTRAVENOUS PRN
Status: DISCONTINUED | OUTPATIENT
Start: 2020-12-04 | End: 2020-12-07 | Stop reason: HOSPADM

## 2020-12-04 RX ORDER — INSULIN GLARGINE 100 [IU]/ML
15 INJECTION, SOLUTION SUBCUTANEOUS NIGHTLY
Status: DISCONTINUED | OUTPATIENT
Start: 2020-12-04 | End: 2020-12-04

## 2020-12-04 RX ORDER — ONDANSETRON 2 MG/ML
4 INJECTION INTRAMUSCULAR; INTRAVENOUS EVERY 6 HOURS PRN
Status: DISCONTINUED | OUTPATIENT
Start: 2020-12-04 | End: 2020-12-07 | Stop reason: HOSPADM

## 2020-12-04 RX ORDER — HYDROCODONE BITARTRATE AND ACETAMINOPHEN 5; 325 MG/1; MG/1
1 TABLET ORAL ONCE
Status: COMPLETED | OUTPATIENT
Start: 2020-12-04 | End: 2020-12-04

## 2020-12-04 RX ORDER — PROMETHAZINE HYDROCHLORIDE 25 MG/1
12.5 TABLET ORAL EVERY 6 HOURS PRN
Status: DISCONTINUED | OUTPATIENT
Start: 2020-12-04 | End: 2020-12-07 | Stop reason: HOSPADM

## 2020-12-04 RX ORDER — ACETAMINOPHEN 325 MG/1
650 TABLET ORAL EVERY 6 HOURS PRN
Status: DISCONTINUED | OUTPATIENT
Start: 2020-12-04 | End: 2020-12-07 | Stop reason: HOSPADM

## 2020-12-04 RX ORDER — TRAMADOL HYDROCHLORIDE 50 MG/1
100 TABLET ORAL EVERY 6 HOURS PRN
Status: DISCONTINUED | OUTPATIENT
Start: 2020-12-04 | End: 2020-12-07 | Stop reason: HOSPADM

## 2020-12-04 RX ORDER — NICOTINE POLACRILEX 4 MG
15 LOZENGE BUCCAL PRN
Status: DISCONTINUED | OUTPATIENT
Start: 2020-12-04 | End: 2020-12-07 | Stop reason: HOSPADM

## 2020-12-04 RX ORDER — SODIUM CHLORIDE 0.9 % (FLUSH) 0.9 %
10 SYRINGE (ML) INJECTION PRN
Status: DISCONTINUED | OUTPATIENT
Start: 2020-12-04 | End: 2020-12-07 | Stop reason: HOSPADM

## 2020-12-04 RX ORDER — SODIUM CHLORIDE 0.9 % (FLUSH) 0.9 %
10 SYRINGE (ML) INJECTION EVERY 12 HOURS SCHEDULED
Status: DISCONTINUED | OUTPATIENT
Start: 2020-12-04 | End: 2020-12-07 | Stop reason: HOSPADM

## 2020-12-04 RX ORDER — ACETAMINOPHEN 650 MG/1
650 SUPPOSITORY RECTAL EVERY 6 HOURS PRN
Status: DISCONTINUED | OUTPATIENT
Start: 2020-12-04 | End: 2020-12-07 | Stop reason: HOSPADM

## 2020-12-04 RX ORDER — TRAMADOL HYDROCHLORIDE 50 MG/1
50 TABLET ORAL EVERY 6 HOURS PRN
Status: DISCONTINUED | OUTPATIENT
Start: 2020-12-04 | End: 2020-12-07 | Stop reason: HOSPADM

## 2020-12-04 RX ADMIN — HYDROCODONE BITARTRATE AND ACETAMINOPHEN 1 TABLET: 5; 325 TABLET ORAL at 11:54

## 2020-12-04 RX ADMIN — TRAMADOL HYDROCHLORIDE 100 MG: 50 TABLET, FILM COATED ORAL at 23:22

## 2020-12-04 RX ADMIN — CLINDAMYCIN PHOSPHATE 600 MG: 150 INJECTION, SOLUTION INTRAMUSCULAR; INTRAVENOUS at 10:49

## 2020-12-04 RX ADMIN — INSULIN LISPRO 1 UNITS: 100 INJECTION, SOLUTION INTRAVENOUS; SUBCUTANEOUS at 20:54

## 2020-12-04 RX ADMIN — ENOXAPARIN SODIUM 40 MG: 40 INJECTION SUBCUTANEOUS at 17:41

## 2020-12-04 RX ADMIN — Medication 1.5 G: at 18:56

## 2020-12-04 RX ADMIN — MUPIROCIN: 20 OINTMENT TOPICAL at 20:48

## 2020-12-04 RX ADMIN — MUPIROCIN: 20 OINTMENT TOPICAL at 17:44

## 2020-12-04 RX ADMIN — TRAMADOL HYDROCHLORIDE 50 MG: 50 TABLET, FILM COATED ORAL at 17:41

## 2020-12-04 RX ADMIN — IOPAMIDOL 75 ML: 755 INJECTION, SOLUTION INTRAVENOUS at 11:39

## 2020-12-04 ASSESSMENT — PAIN DESCRIPTION - PROGRESSION: CLINICAL_PROGRESSION: GRADUALLY WORSENING

## 2020-12-04 ASSESSMENT — PAIN SCALES - GENERAL
PAINLEVEL_OUTOF10: 7
PAINLEVEL_OUTOF10: 5
PAINLEVEL_OUTOF10: 6
PAINLEVEL_OUTOF10: 6

## 2020-12-04 ASSESSMENT — PAIN DESCRIPTION - PAIN TYPE: TYPE: ACUTE PAIN

## 2020-12-04 ASSESSMENT — PAIN DESCRIPTION - FREQUENCY: FREQUENCY: CONTINUOUS

## 2020-12-04 ASSESSMENT — PAIN SCALES - WONG BAKER
WONGBAKER_NUMERICALRESPONSE: 4
WONGBAKER_NUMERICALRESPONSE: 6

## 2020-12-04 ASSESSMENT — PAIN DESCRIPTION - LOCATION: LOCATION: FACE

## 2020-12-04 ASSESSMENT — PAIN DESCRIPTION - DESCRIPTORS: DESCRIPTORS: CONSTANT

## 2020-12-04 ASSESSMENT — PAIN - FUNCTIONAL ASSESSMENT: PAIN_FUNCTIONAL_ASSESSMENT: PREVENTS OR INTERFERES SOME ACTIVE ACTIVITIES AND ADLS

## 2020-12-04 ASSESSMENT — PAIN DESCRIPTION - ONSET: ONSET: PROGRESSIVE

## 2020-12-04 NOTE — ED PROVIDER NOTES
CHIEF COMPLAINT  Cellulitis (redness on nose started on monday with sore and now spreading sent in by pcp hx of MRSA)      HISTORY OF PRESENT ILLNESS  Alon Farnsworth is a 54 y.o. male with a history of diabetes mellitus with recurrent MRSA infections who presents to the ED complaining of cellulitis. Patient states that he began noting some mild swelling across the bridge of his nose on Monday. Patient reports that he has noted increasing redness and midface swelling since that time. Pain is rated a 6/10. No fevers, chills, or sweats of been noted. Patient was seen by PCP and sent to the emergency department for further evaluation and treatment. .   No other complaints, modifying factors or associated symptoms. I have reviewed the following from the nursing documentation. Past Medical History:   Diagnosis Date    Diabetes mellitus ([facility]     Foot ulcer ([facility]     Right foot/diabetic    MRSA (methicillin resistant staph aureus) culture positive 09/03/2020    foot     Past Surgical History:   Procedure Laterality Date    ANKLE SURGERY      FOOT DEBRIDEMENT Right 7/11/2019    RIGHT FOOT  INCISION AND DRAINAGE WITH 5TH METATARSAL BONE RESECTION performed by Elena Kruger DPM at [address] [facility]      right ankle    TOE AMPUTATION Right 7/8/2019    incision and drainage right foot with possible partial 5th ray amputation performed by Elena Kruger DPM at [facility] TOE AMPUTATION Left 9/4/2020    AMPUTATION FIFTH TOE, AND FIFTH METATARSAL HEAD OF LEFT FOOT WITH INCISION AND DEBRIDEMENT ([address] WITH ANTIBIOTIC, WANTS SAGITTAL SAW) performed by Princess Tovar DPM at [facility] reviewed. No pertinent family history.   Social History     Socioeconomic History    Marital status:      Spouse name: Not on file    Number of children: Not on file    Years of education: Not on file    Highest education level: Not on file   Occupational History    Not on file Social Needs    Financial resource strain: Not on file    Food insecurity     Worry: Not on file     Inability: Not on file    Transportation needs     Medical: Not on file     Non-medical: Not on file   Tobacco Use    Smoking status: Never Smoker    Smokeless tobacco: Never Used   Substance and Sexual Activity    Alcohol use: No    Drug use: No    Sexual activity: Not Currently   Lifestyle    Physical activity     Days per week: Not on file     Minutes per session: Not on file    Stress: Not on file   Relationships    Social connections     Talks on phone: Not on file     Gets together: Not on file     Attends Sikhism service: Not on file     Active member of club or organization: Not on file     Attends meetings of clubs or organizations: Not on file     Relationship status: Not on file    Intimate partner violence     Fear of current or ex partner: Not on file     Emotionally abused: Not on file     Physically abused: Not on file     Forced sexual activity: Not on file   Other Topics Concern    Not on file   Social History Narrative    Not on file     No current facility-administered medications for this encounter. Current Outpatient Medications   Medication Sig Dispense Refill    finasteride (PROSCAR) 5 MG tablet Take 1 tablet by mouth daily (Patient not taking: Reported on 9/3/2019) 30 tablet 3    tamsulosin (FLOMAX) 0.4 MG capsule Take 1 capsule by mouth daily (Patient not taking: Reported on 9/3/2019) 30 capsule 3    glipiZIDE (GLUCOTROL) 5 MG tablet Take 15 mg by mouth daily as needed      cyclobenzaprine (FLEXERIL) 10 MG tablet Take 10 mg by mouth 3 times daily as needed       Allergies   Allergen Reactions    Metformin And Related Diarrhea       REVIEW OF SYSTEMS  10 systems reviewed, pertinent positives per HPI otherwise noted to be negative.     PHYSICAL EXAM  BP (!) 140/69   Pulse 70   Temp 98.4 °F (36.9 °C) (Oral)   Resp 16   SpO2 99%   GENERAL APPEARANCE: Awake and alert. Cooperative. No acute distress. HEAD: Normocephalic. Atraumatic. EYES: PERRL. EOM's grossly intact. ENT: Mucous membranes are moist. Developing abscess across the bridge of the nose. Mild to moderate midface tenderness/swelling. No crepitus. NECK: Supple, trachea midline. HEART: RRR. Normal S1S2, no rubs, gallops, or murmurs noted  LUNGS: Respirations unlabored. CTAB. Good air exchange. No wheezes, rales, or rhonchi. Speaking comfortably in full sentences. ABDOMEN: Soft. Non-distended. Non-tender. No guarding or rebound. Normal bowel sounds. EXTREMITIES: No peripheral edema. MAEE. No acute deformities. SKIN: Warm and dry. No acute rashes. NEUROLOGICAL: Alert and oriented X 3. CN II-XII intact. No gross facial drooping. Strength 5/5, sensation intact. Normal coordination. No pronator drift. Gait normal.   PSYCHIATRIC: Normal mood and affect. LABS  I have reviewed all labs for this visit.    Results for orders placed or performed during the hospital encounter of 12/04/20   CBC Auto Differential   Result Value Ref Range    WBC 11.3 (H) 4.0 - 11.0 K/uL    RBC 4.83 4.20 - 5.90 M/uL    Hemoglobin 14.0 13.5 - 17.5 g/dL    Hematocrit 42.5 40.5 - 52.5 %    MCV 88.1 80.0 - 100.0 fL    MCH 29.0 26.0 - 34.0 pg    MCHC 32.9 31.0 - 36.0 g/dL    RDW 13.4 12.4 - 15.4 %    Platelets 789 868 - 274 K/uL    MPV 7.7 5.0 - 10.5 fL    Neutrophils % 74.3 %    Lymphocytes % 15.9 %    Monocytes % 7.0 %    Eosinophils % 2.0 %    Basophils % 0.8 %    Neutrophils Absolute 8.4 (H) 1.7 - 7.7 K/uL    Lymphocytes Absolute 1.8 1.0 - 5.1 K/uL    Monocytes Absolute 0.8 0.0 - 1.3 K/uL    Eosinophils Absolute 0.2 0.0 - 0.6 K/uL    Basophils Absolute 0.1 0.0 - 0.2 K/uL   Comprehensive Metabolic Panel   Result Value Ref Range    Sodium 137 136 - 145 mmol/L    Potassium 5.2 (H) 3.5 - 5.1 mmol/L    Chloride 101 99 - 110 mmol/L    CO2 27 21 - 32 mmol/L    Anion Gap 9 3 - 16    Glucose 243 (H) 70 - 99 mg/dL    BUN 22 (H) 7 - 20 mg/dL    CREATININE 1.0 0.9 - 1.3 mg/dL    GFR Non-African American >60 >60    GFR African American >60 >60    Calcium 9.7 8.3 - 10.6 mg/dL    Total Protein 7.8 6.4 - 8.2 g/dL    Alb 3.8 3.4 - 5.0 g/dL    Albumin/Globulin Ratio 1.0 (L) 1.1 - 2.2    Total Bilirubin 0.4 0.0 - 1.0 mg/dL    Alkaline Phosphatase 121 40 - 129 U/L    ALT 14 10 - 40 U/L    AST 13 (L) 15 - 37 U/L    Globulin 4.0 g/dL   Lactate, Sepsis   Result Value Ref Range    Lactic Acid, Sepsis 0.8 0.4 - 1.9 mmol/L         RADIOLOGY  X-RAYS:  I have reviewed radiologic plain film image(s). ALL OTHER NON-PLAIN FILM IMAGES SUCH AS CT, ULTRASOUND AND MRI HAVE BEEN READ BY THE RADIOLOGIST. CT FACIAL BONES W CONTRAST   Preliminary Result   Diffuse soft tissue swelling overlying the nose consistent with cellulitis. Area of low attenuation superior near the bridge of the nose measures 1.2 x 1   cm suspicious for phlegmon/early abscess. Rechecks: Physical assessment performed. 1010: Pulse 74. Blood pressure stable. ENT physician Dr. Raymundo Odonnell evaluated patient at bedside and agreed with recommendations for admission/IV antibiotics. ED COURSE/MDM  Patient seen and evaluated. Old records reviewed. Labs and imaging reviewed and results discussed with patient. Patient was given Norco, and antibiotics in the ED with good symptomatic relief. Patient was reassessed as noted above . Labs reveal elevated white blood cells with normal lactic. CT imaging of the facial bones reveal 1.2 x 1 cm early abscess. . Plan of care discussed with patient and family. Patient and family in agreement with plan. Patient was given scripts for the following medications. I counseled patient how to take these medications. Current Discharge Medication List          CLINICAL IMPRESSION  1. Facial abscess        Blood pressure (!) 168/77, pulse 80, temperature 98.1 °F (36.7 °C), temperature source Oral, resp. rate 17, SpO2 98 %.     DISPOSITION  Colin Dennis Ana Lucas was admitted in stable condition.         [place] Slim, DO  12/04/20 [id number]

## 2020-12-04 NOTE — H&P
Hospital Medicine History & Physical      PCP: Chinmay Knowles MD, MD    Date of Admission: 12/4/2020    Date of Service: Pt seen/examined on 12/04/20 and Admitted to Inpatient with expected LOS greater than two midnights due to medical therapy. Chief Complaint:  Nose infection    History Of Present Illness: The patient is a pleasant 54 Y M with a h/o DM2 and multiple previous MRSA infections s/p toe amputations and prolonged courses of abx. Starting about 5 days ago he began to have pain, erythema, and edema of the skin on the bridge of his nose. Eventually it began to hurt more, and the pain spread to his forehead and maxillary areas. A purulent area developed. He didn't have systemic symptoms, but it started to look pretty bad so he went to his PCP, who directed him to the ED. Past Medical History:          Diagnosis Date    Diabetes mellitus ([address]     Foot ulcer ([address]     Right foot/diabetic    MRSA (methicillin resistant staph aureus) culture positive 09/03/2020    foot       Past Surgical History:          Procedure Laterality Date    ANKLE SURGERY      FOOT DEBRIDEMENT Right 7/11/2019    RIGHT FOOT  INCISION AND DRAINAGE WITH 5TH METATARSAL BONE RESECTION performed by Merna Siddiqui DPM at [address] [facility]      right ankle    TOE AMPUTATION Right 7/8/2019    incision and drainage right foot with possible partial 5th ray amputation performed by Merna Siddiqui DPM at [facility] TOE AMPUTATION Left 9/4/2020    AMPUTATION FIFTH TOE, AND FIFTH METATARSAL HEAD OF LEFT FOOT WITH INCISION AND DEBRIDEMENT ([facility] WITH ANTIBIOTIC, WANTS SAGITTAL SAW) performed by Haylee Paz DPM at [facility]       Medications Prior to Admission:      Prior to Admission medications    Medication Sig Start Date End Date Taking?  Authorizing Provider   finasteride (PROSCAR) 5 MG tablet Take 1 tablet by mouth daily  Patient not taking: Reported on 9/3/2019 7/16/19   Aguilar Duong MD tamsulosin (FLOMAX) 0.4 MG capsule Take 1 capsule by mouth daily  Patient not taking: Reported on 9/3/2019 7/16/19   Rosalyn Hammans, MD   glipiZIDE (GLUCOTROL) 5 MG tablet Take 15 mg by mouth daily as needed 4/30/19   Historical Provider, MD   cyclobenzaprine (FLEXERIL) 10 MG tablet Take 10 mg by mouth 3 times daily as needed    Historical Provider, MD       Allergies:  Metformin and related    Social History:      The patient currently lives at home    TOBACCO:   reports that he has never smoked. He has never used smokeless tobacco.  ETOH:   reports no history of alcohol use. E-Cigarettes Vaping or Juuling     Questions Responses    Vaping Use     Start Date     Does device contain nicotine? Quit Date     Vaping Type             Family History:      Reviewed in detail and negative for ESRD. Positive as follows:    History reviewed. No pertinent family history. REVIEW OF SYSTEMS:   Pertinent positives as noted in the HPI. All other systems reviewed and negative. PHYSICAL EXAM PERFORMED:    /84   Pulse 70   Temp 98.4 °F (36.9 °C) (Oral)   Resp 16   SpO2 99%       General appearance:  No apparent distress, appears stated age and cooperative. HEENT:  Normal cephalic, atraumatic without obvious deformity. Pupils equal, round, and reactive to light. Extra ocular muscles intact. Conjunctivae/corneas clear. Neck: Supple, with full range of motion. No jugular venous distention. Trachea midline. Respiratory:  Normal respiratory effort. Clear to auscultation, bilaterally without Rales/Wheezes/Rhonchi. Cardiovascular:  Regular rate and rhythm with normal S1/S2 without murmurs, rubs or gallops. Abdomen: Soft, non-tender, non-distended with normal bowel sounds. Musculoskeletal:  No clubbing, cyanosis or edema bilaterally. Full range of motion without deformity. Skin: Skin color, texture, turgor normal.  Purulent cellulitis of the basal bridge.   Neurologic:  Neurovascularly intact without any focal sensory/motor deficits. Cranial nerves: II-XII intact, grossly non-focal.  Psychiatric:  Alert and oriented, thought content appropriate, normal insight  Capillary Refill: Brisk,< 3 seconds   Peripheral Pulses: +2 palpable, equal bilaterally       Labs:     Recent Labs     12/04/20  1038   WBC 11.3*   HGB 14.0   HCT 42.5        Recent Labs     12/04/20  1038      K 5.2*      CO2 27   BUN 22*   CREATININE 1.0   CALCIUM 9.7     Recent Labs     12/04/20  1038   AST 13*   ALT 14   BILITOT 0.4   ALKPHOS 121     No results for input(s): INR in the last 72 hours. No results for input(s): Emperatriz Comment in the last 72 hours. Urinalysis:      Lab Results   Component Value Date    NITRU Negative 07/08/2019    WBCUA 0-2 07/08/2019    RBCUA 10-20 07/08/2019    BLOODU SMALL 07/08/2019    SPECGRAV <=1.005 07/08/2019    GLUCOSEU Negative 07/08/2019       Radiology:     CXR: I have reviewed the CXR with the following interpretation:   EKG:  I have reviewed the EKG with the following interpretation:     CT FACIAL BONES W CONTRAST   Preliminary Result   Diffuse soft tissue swelling overlying the nose consistent with cellulitis. Area of low attenuation superior near the bridge of the nose measures 1.2 x 1   cm suspicious for phlegmon/early abscess. ASSESSMENT:    Active Hospital Problems    Diagnosis Date Noted    Nasal abscess [J34.0] 12/04/2020         PLAN:      Cellulitis and 1.2 cm abscess of the nasal bridge. Presumably due to MRSA. - vanc started 12/4, f/u culture swab from unroofing of abscess in the ED. - warm compresses. Mupirocin. - appreciate ENT input    DM2  - f/u A1c. Continue glipizide. Also SSI while here. DVT Prophylaxis: enoxaparin  Diet: DIET CARB CONTROL;  Code Status: Full Code    PT/OT Eval Status: not indicated    Dispo - perhaps 12/6, pending culture sensitivities.         Montrell Perez MD    Thank you Candis Huff MD, MD for the opportunity to be involved in this patient's care. If you have any questions or concerns please feel free to contact me at [phone].

## 2020-12-04 NOTE — CONSULTS
Pharmacy Note  Vancomycin Consult    Yolanda Del Rio is a 54 y.o. male started on Vancomycin for cellulitis; consult received from Dr. Keila Wallis to manage therapy. Allergies:  Metformin and related     Recent Labs     12/04/20  1038   CREATININE 1.0       Recent Labs     12/04/20  1038   WBC 11.3*       Crcl = 90ml/min    Wt Readings from Last 1 Encounters:   09/02/20 233 lb 1 oz (105.7 kg)         There is no height or weight on file to calculate BMI. Maintenance dose: starting at the next dosing interval determined by renal function  Goal Vancomycin trough: 10-15 mcg/mL  Goal Vancomycin AUC: 400-600     Assessment/Plan:  Will initiate Vancomycin 1500 mg IV every 12 hours. Calculated  Vancomycin trough ordered for 12/6 0530 . Timing of trough level will be determined based on culture results, renal function, and clinical response. Thank you for the consult. 12/6 0639  Vanc trough = 16.7 mcg/mL~12hrs post dose  CrCl cannot be calculated (Unknown ideal weight.).   Continue with current dosing  Pharmacy will continue to dose and manage Vancomycin per Consult order  Nancy Mack PharmD  12/6/2020 at 8:21 AM

## 2020-12-04 NOTE — CONSULTS
[facility]      Patient Name: Chris Osorio Record Number:  [id number]  Primary Care Physician:  Kaylee Estes MD, MD  Date of Consultation: 12/4/2020    Chief Complaint: Nasal swelling    HISTORY OF PRESENT ILLNESS  [facility] is a(n) 54 y.o. male with 1 week of nasal swelling and erythema and worsening pain. States symptoms significantly worsened in the last 2 days 5-10 to seek evaluation by PCP who recommend evaluation in ED. ED obtain CT soft tissue neck which indicated potential phlegmon/abscess of nasal tip as well significant cellulitic changes. Has history significant with MRSA. Is a type II diabetic insulin-dependent. Report significant facial pressure and pain but denies vision changes.       Patient Active Problem List   Diagnosis    Osteomyelitis ([address]    Right foot infection    Hypertension associated with diabetes ([address]    Type 2 diabetes mellitus ([address]    Sepsis ([address]    Uncontrolled type 2 diabetes mellitus with hyperglycemia ([address]    High fever    Bandemia    Closed displaced fracture of fifth metatarsal bone of right foot    Elevated sed rate    Elevated C-reactive protein (CRP)    Overweight    Failure of outpatient treatment    Diabetic polyneuropathy associated with type 2 diabetes mellitus ([address]    Receiving intravenous antibiotic treatment as outpatient    Weight loss counseling, encounter for     Past Surgical History:   Procedure Laterality Date    ANKLE SURGERY      FOOT DEBRIDEMENT Right 7/11/2019    RIGHT FOOT  INCISION AND DRAINAGE WITH 5TH METATARSAL BONE RESECTION performed by Chet Otero DPM at [address] [facility]      right ankle    TOE AMPUTATION Right 7/8/2019    incision and drainage right foot with possible partial 5th ray amputation performed by Chet Otero DPM at [address] [facility] [address] Left 9/4/2020    AMPUTATION FIFTH TOE, AND FIFTH METATARSAL HEAD OF LEFT FOOT WITH INCISION AND DEBRIDEMENT ([address] WITH ANTIBIOTIC, WANTS SAGITTAL SAW) performed by Chinmay Adam DPM at [facility]. No pertinent family history. Social History     Tobacco Use    Smoking status: Never Smoker    Smokeless tobacco: Never Used   Substance Use Topics    Alcohol use: No    Drug use: No     DRUG/FOOD ALLERGIES: Metformin and related    CURRENT MEDICATIONS  Prior to Admission medications    Medication Sig Start Date End Date Taking?  Authorizing Provider   finasteride (PROSCAR) 5 MG tablet Take 1 tablet by mouth daily  Patient not taking: Reported on 9/3/2019 7/16/19   Peyton Hidalgo MD   tamsulosin (FLOMAX) 0.4 MG capsule Take 1 capsule by mouth daily  Patient not taking: Reported on 9/3/2019 7/16/19   Peyton Hidalgo MD   glipiZIDE (GLUCOTROL) 5 MG tablet Take 15 mg by mouth daily as needed 4/30/19   Historical Provider, MD   cyclobenzaprine (FLEXERIL) 10 MG tablet Take 10 mg by mouth 3 times daily as needed    Historical Provider, MD       REVIEW OF SYSTEMS  The following systems were reviewed and revealed the following in addition to any already discussed in the HPI:    CONSTITUTIONAL: denies weight loss, no fever, no night sweats, no chills  EYES: no vision changes, no blurry vision  EARS: no changes in hearing, no otalgia  NOSE: +nasal swelling  RESPIRATORY: no difficulty breathing, no shortness of breath  CV: no chest pain, no palpitations  HEME: No coagulation disorder, no bleeding disorder  NEURO: no TIA or stroke-like symptoms  SKIN: No new rashes in the head and neck, no recent skin cancers  MOUTH: No new ulcers, no recent teeth infections  GASTROINTESTINAL: No diarrhea, stomach pain  PSYCH: No anxiety, no depression      PHYSICAL EXAM  BP (!) 140/69   Pulse 70   Temp 98.4 °F (36.9 °C) (Oral)   Resp 16   SpO2 99%     GENERAL: No Acute Distress, Alert and Oriented, no hoarseness  EYES: EOMI, Anti-icteric  NOSE: Significant erythema and edema of external nose.  Crusting of right nasal tip removed with immediate expression of purulence. Crusting of left nasal bridge. No underlying fluctuance. EARS: Normal external canal appearance, EAC patent bilaterally  FACE: 1/6 House-Brackmann Scale, symmetric, sensation equal bilaterally  ORAL CAVITY: No masses or lesions palpated, uvula is midline, moist mucous membranes  NECK: Normal range of motion, no thyromegaly, trachea is midline, no lymphadenopathy, no neck masses, no crepitus  CHEST: Normal respiratory effort, no retractions, breathing comfortably  SKIN: No rashes, normal appearing skin, no evidence of skin lesions/tumors    RADIOLOGY  Summary of findings:  CT soft tissue neck images reviewed indicating cellulitic changes to nose as well as phlegmon/abscess of right nasal tip    PROCEDURE  N/A    ASSESSMENT/PLAN  Dariel estrada is a very pleasant 54 y.o. male with abscess of nasal tip and facial cellulitis    -Abscess unroofed and purulence cultured-likely MRSA given location of patient's history  -Recommend admission for IV antibiotics secondary to significance of nasal cellulitis as well as history of diabetes  -IV antibiotic should cover him for MRSA clindamycin is okay of previous infection sensitive to Clinda otherwise she was started on vancomycin  -Warm compresses to the nose  -Mupirocin ointment  -Please maintain tight glycemic control  -Will be reevaluated by ENT tomorrow morning    Genia Black DO  Otolaryngology    Medical Decision Making:   The following items were considered in medical decision making:  Independent review of images  Review / order clinical lab tests  Review / order radiology tests  Decision to obtain old records

## 2020-12-04 NOTE — ED NOTES
[address] [facility]  facial abscess  [facility]  called back     [place] Benjamin  12/04/20 6668

## 2020-12-05 LAB
ANION GAP SERPL CALCULATED.3IONS-SCNC: 7 MMOL/L (ref 3–16)
BASOPHILS ABSOLUTE: 0.1 K/UL (ref 0–0.2)
BASOPHILS RELATIVE PERCENT: 0.7 %
BUN BLDV-MCNC: 17 MG/DL (ref 7–20)
CALCIUM SERPL-MCNC: 9 MG/DL (ref 8.3–10.6)
CHLORIDE BLD-SCNC: 101 MMOL/L (ref 99–110)
CO2: 28 MMOL/L (ref 21–32)
CREAT SERPL-MCNC: 1 MG/DL (ref 0.9–1.3)
EOSINOPHILS ABSOLUTE: 0.2 K/UL (ref 0–0.6)
EOSINOPHILS RELATIVE PERCENT: 1.8 %
ESTIMATED AVERAGE GLUCOSE: 148.5 MG/DL
GFR AFRICAN AMERICAN: >60
GFR NON-AFRICAN AMERICAN: >60
GLUCOSE BLD-MCNC: 195 MG/DL (ref 70–99)
HBA1C MFR BLD: 6.8 %
HCT VFR BLD CALC: 37.5 % (ref 40.5–52.5)
HEMOGLOBIN: 12.5 G/DL (ref 13.5–17.5)
LYMPHOCYTES ABSOLUTE: 2.4 K/UL (ref 1–5.1)
LYMPHOCYTES RELATIVE PERCENT: 22.8 %
MCH RBC QN AUTO: 29.7 PG (ref 26–34)
MCHC RBC AUTO-ENTMCNC: 33.4 G/DL (ref 31–36)
MCV RBC AUTO: 88.9 FL (ref 80–100)
MONOCYTES ABSOLUTE: 0.8 K/UL (ref 0–1.3)
MONOCYTES RELATIVE PERCENT: 7.8 %
NEUTROPHILS ABSOLUTE: 7 K/UL (ref 1.7–7.7)
NEUTROPHILS RELATIVE PERCENT: 66.9 %
PDW BLD-RTO: 13.5 % (ref 12.4–15.4)
PLATELET # BLD: 284 K/UL (ref 135–450)
PMV BLD AUTO: 7.5 FL (ref 5–10.5)
POTASSIUM REFLEX MAGNESIUM: 4.7 MMOL/L (ref 3.5–5.1)
RBC # BLD: 4.22 M/UL (ref 4.2–5.9)
SODIUM BLD-SCNC: 136 MMOL/L (ref 136–145)
WBC # BLD: 10.5 K/UL (ref 4–11)

## 2020-12-05 PROCEDURE — 80048 BASIC METABOLIC PNL TOTAL CA: CPT

## 2020-12-05 PROCEDURE — 85025 COMPLETE CBC W/AUTO DIFF WBC: CPT

## 2020-12-05 PROCEDURE — 6360000002 HC RX W HCPCS: Performed by: INTERNAL MEDICINE

## 2020-12-05 PROCEDURE — 1200000000 HC SEMI PRIVATE

## 2020-12-05 PROCEDURE — 36415 COLL VENOUS BLD VENIPUNCTURE: CPT

## 2020-12-05 PROCEDURE — 6370000000 HC RX 637 (ALT 250 FOR IP): Performed by: NURSE PRACTITIONER

## 2020-12-05 PROCEDURE — 2580000003 HC RX 258: Performed by: INTERNAL MEDICINE

## 2020-12-05 PROCEDURE — 6370000000 HC RX 637 (ALT 250 FOR IP): Performed by: INTERNAL MEDICINE

## 2020-12-05 PROCEDURE — 99232 SBSQ HOSP IP/OBS MODERATE 35: CPT | Performed by: OTOLARYNGOLOGY

## 2020-12-05 RX ADMIN — MUPIROCIN: 20 OINTMENT TOPICAL at 19:49

## 2020-12-05 RX ADMIN — TRAMADOL HYDROCHLORIDE 100 MG: 50 TABLET, FILM COATED ORAL at 06:43

## 2020-12-05 RX ADMIN — INSULIN LISPRO 1 UNITS: 100 INJECTION, SOLUTION INTRAVENOUS; SUBCUTANEOUS at 12:43

## 2020-12-05 RX ADMIN — GLIPIZIDE 10 MG: 5 TABLET ORAL at 10:02

## 2020-12-05 RX ADMIN — Medication 1.5 G: at 18:08

## 2020-12-05 RX ADMIN — MUPIROCIN: 20 OINTMENT TOPICAL at 14:28

## 2020-12-05 RX ADMIN — INSULIN LISPRO 1 UNITS: 100 INJECTION, SOLUTION INTRAVENOUS; SUBCUTANEOUS at 10:04

## 2020-12-05 RX ADMIN — ENOXAPARIN SODIUM 40 MG: 40 INJECTION SUBCUTANEOUS at 10:02

## 2020-12-05 RX ADMIN — TRAMADOL HYDROCHLORIDE 100 MG: 50 TABLET, FILM COATED ORAL at 14:28

## 2020-12-05 RX ADMIN — SODIUM CHLORIDE, PRESERVATIVE FREE 10 ML: 5 INJECTION INTRAVENOUS at 19:50

## 2020-12-05 RX ADMIN — TRAMADOL HYDROCHLORIDE 100 MG: 50 TABLET, FILM COATED ORAL at 20:39

## 2020-12-05 RX ADMIN — MUPIROCIN: 20 OINTMENT TOPICAL at 10:03

## 2020-12-05 RX ADMIN — Medication 1.5 G: at 06:42

## 2020-12-05 ASSESSMENT — PAIN SCALES - GENERAL
PAINLEVEL_OUTOF10: 7
PAINLEVEL_OUTOF10: 7
PAINLEVEL_OUTOF10: 5
PAINLEVEL_OUTOF10: 4
PAINLEVEL_OUTOF10: 7

## 2020-12-05 ASSESSMENT — PAIN SCALES - WONG BAKER
WONGBAKER_NUMERICALRESPONSE: 4

## 2020-12-05 ASSESSMENT — PAIN DESCRIPTION - PROGRESSION
CLINICAL_PROGRESSION: GRADUALLY WORSENING

## 2020-12-05 ASSESSMENT — PAIN DESCRIPTION - PAIN TYPE: TYPE: ACUTE PAIN

## 2020-12-05 ASSESSMENT — PAIN DESCRIPTION - LOCATION: LOCATION: NOSE;FACE

## 2020-12-05 NOTE — PROGRESS NOTES
Hospitalist Progress Note      PCP: Gillian Dominguez MD, MD    Date of Admission: 12/4/2020    Chief Complaint:  nasal cellulitis      Subjective:  He feels a little better. Less painful. The cellulitis is less bright red today - fading a bit. Edema might have worsened some, though, and now involves the L supraorbital area. No fevers or chills. Medications:  Reviewed    Infusion Medications    dextrose       Scheduled Medications    mupirocin   Nasal TID    sodium chloride flush  10 mL Intravenous 2 times per day    enoxaparin  40 mg Subcutaneous Daily    insulin lispro  0-6 Units Subcutaneous TID WC    insulin lispro  0-3 Units Subcutaneous Nightly    glipiZIDE  10 mg Oral QAM AC    vancomycin  1,500 mg Intravenous Q12H     PRN Meds: sodium chloride flush, acetaminophen **OR** acetaminophen, polyethylene glycol, glucose, dextrose, glucagon (rDNA), dextrose, promethazine **OR** ondansetron, traMADol, traMADol      Intake/Output Summary (Last 24 hours) at 12/5/2020 1508  Last data filed at 12/5/2020 1428  Gross per 24 hour   Intake 610 ml   Output 175 ml   Net 435 ml       Physical Exam Performed:    BP (!) 153/65   Pulse 74   Temp 98 °F (36.7 °C) (Oral)   Resp 16   SpO2 94%       General appearance:  No apparent distress, appears stated age and cooperative. HEENT:  Normal cephalic, atraumatic without obvious deformity. Pupils equal, round, and reactive to light. Extra ocular muscles intact. Conjunctivae/corneas clear. Neck: Supple, with full range of motion. No jugular venous distention. Trachea midline. Respiratory:  Normal respiratory effort. Clear to auscultation, bilaterally without Rales/Wheezes/Rhonchi. Cardiovascular:  Regular rate and rhythm with normal S1/S2 without murmurs, rubs or gallops. Abdomen: Soft, non-tender, non-distended with normal bowel sounds. Musculoskeletal:  No clubbing, cyanosis or edema bilaterally. Full range of motion without deformity.   Skin: Skin color, texture, turgor normal.  Purulent cellulitis of the basal bridge. Neurologic:  Neurovascularly intact without any focal sensory/motor deficits. Cranial nerves: II-XII intact, grossly non-focal.  Psychiatric:  Alert and oriented, thought content appropriate, normal insight  Capillary Refill: Brisk,< 3 seconds   Peripheral Pulses: +2 palpable, equal bilaterally       Labs:   Recent Labs     12/04/20  1038 12/05/20  0706   WBC 11.3* 10.5   HGB 14.0 12.5*   HCT 42.5 37.5*    284     Recent Labs     12/04/20  1038 12/05/20  0706    136   K 5.2* 4.7    101   CO2 27 28   BUN 22* 17   CREATININE 1.0 1.0   CALCIUM 9.7 9.0     Recent Labs     12/04/20  1038   AST 13*   ALT 14   BILITOT 0.4   ALKPHOS 121     No results for input(s): INR in the last 72 hours. No results for input(s): Moises Hug in the last 72 hours. Urinalysis:      Lab Results   Component Value Date    NITRU Negative 07/08/2019    WBCUA 0-2 07/08/2019    RBCUA 10-20 07/08/2019    BLOODU SMALL 07/08/2019    SPECGRAV <=1.005 07/08/2019    GLUCOSEU Negative 07/08/2019       Radiology:  CT FACIAL BONES W CONTRAST   Final Result   Diffuse soft tissue swelling overlying the nose consistent with cellulitis. Area of low attenuation superior near the bridge of the nose measures 1.2 x 1   cm suspicious for phlegmon/early abscess. Assessment/Plan:    Active Hospital Problems    Diagnosis    Nasal abscess [J34.0]       The patient is a pleasant 54 Y M with a h/o DM2 and multiple previous MRSA infections s/p toe amputations and prolonged courses of abx. Starting about 5 days ago he began to have pain, erythema, and edema of the skin on the bridge of his nose. Eventually it began to hurt more, and the pain spread to his forehead and maxillary areas. A purulent area developed. He didn't have systemic symptoms, but it started to look pretty bad so he went to his PCP, who directed him to the ED.       Cellulitis and 1.2 cm abscess of the nasal bridge. Presumably due to MRSA. - vanc started 12/4, f/u culture swab from unroofing of abscess in the ED ([address] so far). - warm compresses. Mupirocin. - appreciate ENT input     DM2  - A1c 6.8. Continue glipizide. Also SSI while here. DVT Prophylaxis: enoxaparin  Diet: DIET CARB CONTROL;  Code Status: Full Code    PT/OT Eval Status: not indicated    Dispo - perhaps 12/6, pending culture sensitivities.        Karmen Graham MD

## 2020-12-05 NOTE — PLAN OF CARE
Problem: Pain:  Goal: Control of acute pain  Description: Control of acute pain  12/5/2020 1057 by Waldemar Raya RN  Outcome: Ongoing  Note: Pt satisfied with current pain medication regimen. 12/5/2020 1057 by Waldemar Raya RN  Outcome: Ongoing  Note: Pt satisfied with current pain medication regimen.

## 2020-12-05 NOTE — PROGRESS NOTES
[address] [facility] SURGERY        Patient Name: Chris Osorio Record Number:  [id number]  Primary Care Physician:  Fabian Cerrato MD, MD  Date of Consultation: 12/5/2020    Chief Complaint: Nasal swelling    Subjective: Patient seen and examined. Feels he is improving- reports less pain and pressure. Denies HA, change in vision, nausea, vomiting. Has been using ointment and warm compresses    PHYSICAL EXAM  /68   Pulse 75   Temp 98.6 °F (37 °C) (Oral)   Resp 16   SpO2 96%     GENERAL: No Acute Distress, Alert and Oriented, no hoarseness  EYES: EOMI, Anti-icteri, increased left periorbital edema  NOSE: Improvement in erythema. Right nasal tip crusting debrided- minimal purulence expressed. Nasal dorsum with crusting removed, no palpable fluctuance- area improved from yesterday.    EARS: Normal external canal appearance, EAC patent bilaterally  FACE: 1/6 House-Brackmann Scale, symmetric, sensation equal bilaterally  ORAL CAVITY: No masses or lesions palpated, uvula is midline, moist mucous membranes  NECK: Normal range of motion, no thyromegaly, trachea is midline, no lymphadenopathy, no neck masses, no crepitus  CHEST: Normal respiratory effort, no retractions, breathing comfortably  SKIN: No rashes, normal appearing skin, no evidence of skin lesions/tumors    RADIOLOGY  Summary of findings:  CT soft tissue neck images reviewed indicating cellulitic changes to nose as well as phlegmon/abscess of right nasal tip and nasal dorsum    PROCEDURE  N/A    ASSESSMENT/PLAN  Merlyn Hi is a very pleasant 54 y.o. male with abscess of nasal tip and facial cellulitis    -Improving  -Continue warm compresses, mupirocin intranasal and to crusting of nasal dorsum  -Prelim cultures: gram + Cocci  -Continue tight glycemic control  -Continue IV abx with MRSA coverage  -Patient should remain inpatient today due to DM-2 and severity of facial cellulitis  -If he continues to improve possible d/c home tomorrow  -Will be reevaluated by ENT tomorrow morning    Jeff Cavazos DO  Otolaryngology    Medical Decision Making:   The following items were considered in medical decision making:  Independent review of images  Review / order clinical lab tests  Review / order radiology tests  Decision to obtain old records

## 2020-12-05 NOTE — PROGRESS NOTES
Perfect Serve:    Pt admitted today for Nasal abscess/possible MRSA-Cellulitis -Q6 Tramadol 50Mg last administered @ 1741 pt with pain 6/10 , anything for breakthrough pain?   Please advise

## 2020-12-06 ENCOUNTER — APPOINTMENT (OUTPATIENT)
Dept: CT IMAGING | Age: 55
DRG: 115 | End: 2020-12-06
Payer: COMMERCIAL

## 2020-12-06 LAB
ANION GAP SERPL CALCULATED.3IONS-SCNC: 8 MMOL/L (ref 3–16)
BASOPHILS ABSOLUTE: 0.1 K/UL (ref 0–0.2)
BASOPHILS RELATIVE PERCENT: 0.6 %
BUN BLDV-MCNC: 16 MG/DL (ref 7–20)
CALCIUM SERPL-MCNC: 9.1 MG/DL (ref 8.3–10.6)
CHLORIDE BLD-SCNC: 99 MMOL/L (ref 99–110)
CO2: 27 MMOL/L (ref 21–32)
CREAT SERPL-MCNC: 0.9 MG/DL (ref 0.9–1.3)
EOSINOPHILS ABSOLUTE: 0.3 K/UL (ref 0–0.6)
EOSINOPHILS RELATIVE PERCENT: 2.4 %
GFR AFRICAN AMERICAN: >60
GFR NON-AFRICAN AMERICAN: >60
GLUCOSE BLD-MCNC: 119 MG/DL (ref 70–99)
HCT VFR BLD CALC: 38.2 % (ref 40.5–52.5)
HEMOGLOBIN: 12.6 G/DL (ref 13.5–17.5)
LYMPHOCYTES ABSOLUTE: 2.1 K/UL (ref 1–5.1)
LYMPHOCYTES RELATIVE PERCENT: 16.8 %
MCH RBC QN AUTO: 29 PG (ref 26–34)
MCHC RBC AUTO-ENTMCNC: 33.1 G/DL (ref 31–36)
MCV RBC AUTO: 87.5 FL (ref 80–100)
MONOCYTES ABSOLUTE: 1 K/UL (ref 0–1.3)
MONOCYTES RELATIVE PERCENT: 7.8 %
NEUTROPHILS ABSOLUTE: 9.3 K/UL (ref 1.7–7.7)
NEUTROPHILS RELATIVE PERCENT: 72.4 %
PDW BLD-RTO: 13.2 % (ref 12.4–15.4)
PLATELET # BLD: 279 K/UL (ref 135–450)
PMV BLD AUTO: 7.1 FL (ref 5–10.5)
POTASSIUM REFLEX MAGNESIUM: 4.7 MMOL/L (ref 3.5–5.1)
RBC # BLD: 4.36 M/UL (ref 4.2–5.9)
SODIUM BLD-SCNC: 134 MMOL/L (ref 136–145)
VANCOMYCIN TROUGH: 16.7 UG/ML (ref 10–20)
WBC # BLD: 12.8 K/UL (ref 4–11)

## 2020-12-06 PROCEDURE — 6370000000 HC RX 637 (ALT 250 FOR IP): Performed by: INTERNAL MEDICINE

## 2020-12-06 PROCEDURE — 85025 COMPLETE CBC W/AUTO DIFF WBC: CPT

## 2020-12-06 PROCEDURE — 80048 BASIC METABOLIC PNL TOTAL CA: CPT

## 2020-12-06 PROCEDURE — 36415 COLL VENOUS BLD VENIPUNCTURE: CPT

## 2020-12-06 PROCEDURE — 99232 SBSQ HOSP IP/OBS MODERATE 35: CPT | Performed by: OTOLARYNGOLOGY

## 2020-12-06 PROCEDURE — 70491 CT SOFT TISSUE NECK W/DYE: CPT

## 2020-12-06 PROCEDURE — 6370000000 HC RX 637 (ALT 250 FOR IP): Performed by: NURSE PRACTITIONER

## 2020-12-06 PROCEDURE — 1200000000 HC SEMI PRIVATE

## 2020-12-06 PROCEDURE — 2580000003 HC RX 258: Performed by: INTERNAL MEDICINE

## 2020-12-06 PROCEDURE — 80202 ASSAY OF VANCOMYCIN: CPT

## 2020-12-06 PROCEDURE — 6360000002 HC RX W HCPCS: Performed by: INTERNAL MEDICINE

## 2020-12-06 PROCEDURE — 6360000004 HC RX CONTRAST MEDICATION: Performed by: INTERNAL MEDICINE

## 2020-12-06 RX ADMIN — ENOXAPARIN SODIUM 40 MG: 40 INJECTION SUBCUTANEOUS at 07:44

## 2020-12-06 RX ADMIN — SODIUM CHLORIDE, PRESERVATIVE FREE 10 ML: 5 INJECTION INTRAVENOUS at 21:00

## 2020-12-06 RX ADMIN — TRAMADOL HYDROCHLORIDE 100 MG: 50 TABLET, FILM COATED ORAL at 02:42

## 2020-12-06 RX ADMIN — IOPAMIDOL 75 ML: 755 INJECTION, SOLUTION INTRAVENOUS at 09:39

## 2020-12-06 RX ADMIN — MUPIROCIN: 20 OINTMENT TOPICAL at 07:49

## 2020-12-06 RX ADMIN — TRAMADOL HYDROCHLORIDE 100 MG: 50 TABLET, FILM COATED ORAL at 08:29

## 2020-12-06 RX ADMIN — SODIUM CHLORIDE, PRESERVATIVE FREE 10 ML: 5 INJECTION INTRAVENOUS at 07:46

## 2020-12-06 RX ADMIN — Medication 1.5 G: at 19:07

## 2020-12-06 RX ADMIN — MUPIROCIN: 20 OINTMENT TOPICAL at 12:45

## 2020-12-06 RX ADMIN — GLIPIZIDE 10 MG: 5 TABLET ORAL at 07:49

## 2020-12-06 RX ADMIN — Medication 1.5 G: at 07:40

## 2020-12-06 RX ADMIN — TRAMADOL HYDROCHLORIDE 100 MG: 50 TABLET, FILM COATED ORAL at 18:14

## 2020-12-06 RX ADMIN — POLYETHYLENE GLYCOL 3350 17 G: 17 POWDER, FOR SOLUTION ORAL at 12:42

## 2020-12-06 ASSESSMENT — PAIN SCALES - GENERAL
PAINLEVEL_OUTOF10: 8
PAINLEVEL_OUTOF10: 6
PAINLEVEL_OUTOF10: 6
PAINLEVEL_OUTOF10: 8
PAINLEVEL_OUTOF10: 6
PAINLEVEL_OUTOF10: 5

## 2020-12-06 ASSESSMENT — PAIN - FUNCTIONAL ASSESSMENT: PAIN_FUNCTIONAL_ASSESSMENT: PREVENTS OR INTERFERES SOME ACTIVE ACTIVITIES AND ADLS

## 2020-12-06 ASSESSMENT — PAIN DESCRIPTION - FREQUENCY: FREQUENCY: CONTINUOUS

## 2020-12-06 ASSESSMENT — PAIN DESCRIPTION - PAIN TYPE
TYPE: ACUTE PAIN
TYPE: ACUTE PAIN

## 2020-12-06 ASSESSMENT — PAIN DESCRIPTION - DESCRIPTORS: DESCRIPTORS: CONSTANT

## 2020-12-06 ASSESSMENT — PAIN DESCRIPTION - LOCATION
LOCATION: NOSE;FACE
LOCATION: NOSE;FACE

## 2020-12-06 ASSESSMENT — PAIN DESCRIPTION - PROGRESSION
CLINICAL_PROGRESSION: GRADUALLY WORSENING
CLINICAL_PROGRESSION: GRADUALLY WORSENING

## 2020-12-06 ASSESSMENT — PAIN DESCRIPTION - ONSET: ONSET: PROGRESSIVE

## 2020-12-06 NOTE — PROGRESS NOTES
Hospitalist Progress Note      PCP: Jamel Stanley MD, MD    Date of Admission: 12/4/2020    Chief Complaint:  nasal cellulitis      Subjective: The L periobital edema, which was present yesterday morning when I rounded, has continued to worsen. The skin erythema has greatly improved, and the drainage from the nasal bridge is no longer purulent. He complains of pain around the eye. Afebrile, no malaise. Medications:  Reviewed    Infusion Medications    dextrose       Scheduled Medications    mupirocin   Nasal TID    sodium chloride flush  10 mL Intravenous 2 times per day    enoxaparin  40 mg Subcutaneous Daily    insulin lispro  0-6 Units Subcutaneous TID WC    insulin lispro  0-3 Units Subcutaneous Nightly    glipiZIDE  10 mg Oral QAM AC    vancomycin  1,500 mg Intravenous Q12H     PRN Meds: sodium chloride flush, acetaminophen **OR** acetaminophen, polyethylene glycol, glucose, dextrose, glucagon (rDNA), dextrose, promethazine **OR** ondansetron, traMADol, traMADol      Intake/Output Summary (Last 24 hours) at 12/6/2020 0915  Last data filed at 12/6/2020 0622  Gross per 24 hour   Intake 950 ml   Output 725 ml   Net 225 ml       Physical Exam Performed:    BP (!) 160/85   Pulse 74   Temp 98.3 °F (36.8 °C) (Oral)   Resp 16   SpO2 93%       General appearance:  No apparent distress, appears stated age and cooperative. HEENT:  Normal cephalic, atraumatic without obvious deformity. Pupils equal, round, and reactive to light. Extra ocular muscles intact. Conjunctivae/corneas clear. Neck: Supple, with full range of motion. No jugular venous distention. Trachea midline. Respiratory:  Normal respiratory effort. Clear to auscultation, bilaterally without Rales/Wheezes/Rhonchi. Cardiovascular:  Regular rate and rhythm with normal S1/S2 without murmurs, rubs or gallops. Abdomen: Soft, non-tender, non-distended with normal bowel sounds.   Musculoskeletal:  No clubbing, cyanosis or edema nose.  Eventually it began to hurt more, and the pain spread to his forehead and maxillary areas. A purulent area developed. He didn't have systemic symptoms, but it started to look pretty bad so he went to his PCP, who directed him to the ED. MRSA cellulitis and 1.2 cm abscess of the nasal bridge.   - vanc started 12/4, f/u sensitivities. - warm compresses. Mupirocin. - appreciate ENT input. F/u repeat CT scan.     DM2  - A1c 6.8. Continue glipizide. Also SSI while here. DVT Prophylaxis: enoxaparin  Diet: DIET CARB CONTROL;  Code Status: Full Code    PT/OT Eval Status: not indicated    Dispo - perhaps 12/8, pending ENT input. He lives at home.       Anita Galvan MD

## 2020-12-06 NOTE — PROGRESS NOTES
[address] [facility] SURGERY        Patient Name: Chris Osorio Record Number:  [id number]  Primary Care Physician:  Lala Smith MD, MD  Date of Consultation: 12/6/2020    Chief Complaint: Nasal swelling    Subjective: Patient seen and examined. Reports sudden worsening of swelling of left eye and forehead around midnight. Denies HA, change in vision, nausea, vomiting. Has been using ointment and warm compresses. PHYSICAL EXAM  BP (!) 166/75   Pulse 69   Temp 98.4 °F (36.9 °C) (Oral)   Resp 16   SpO2 95%     GENERAL: No Acute Distress, Alert and Oriented, no hoarseness  EYES: EOMI right, increased left periorbital edema- unable to open left eye  NOSE: Improvement in erythema. No crusting of right nasal tip- no purulence expressed with manipulation. Nasal dorsum draining serosanginous- no purulence. EARS: Normal external canal appearance, EAC patent bilaterally  FACE: Significant soft swelling of central forehead with tenderness. No induration, erythema. No subcutaneous emphysema. No palpable fluctuance.  1/6 House-Brackmann Scale, symmetric, sensation equal bilaterally  ORAL CAVITY: No masses or lesions palpated, uvula is midline, moist mucous membranes  NECK: Normal range of motion, no thyromegaly, trachea is midline, no lymphadenopathy, no neck masses, no crepitus  CHEST: Normal respiratory effort, no retractions, breathing comfortably  SKIN: No rashes, normal appearing skin, no evidence of skin lesions/tumors    RADIOLOGY  Summary of findings:  CT soft tissue neck 12/4: cellulitic changes to nose as well as phlegmon/abscess of right nasal tip and nasal dorsum    PROCEDURE  N/A    ASSESSMENT/PLAN  Dariel estrada is a very pleasant 54 y.o. male with abscess of nasal tip and facial cellulitis now with worsening edema    -Erythema and swelling of nose continues to improve but worsening swelling of left eye and forehead  -Repeat CT neck with contrast ordered to evaluate for possible subclinical abscess  -Continue warm compresses, mupirocin intranasal and to crusting of nasal dorsum  -Prelim cultures: gram + Cocci,   -Continue tight glycemic control  -Continue IV abx with MRSA coverage (history clindamycin sensitive MRSA)- on Vancomycin  -Will await repeat CT to determine if change in treatment plan needed    Please page with any questions or concerns    Mitzy Forrest, DO  Otolaryngology    Medical Decision Making:   The following items were considered in medical decision making:  Independent review of images  Review / order clinical lab tests  Review / order radiology tests  Decision to obtain old records

## 2020-12-06 NOTE — PROGRESS NOTES
Perfect serve Dr. Georgina Sheppardfilippo: culture came back positive for MRSA. FYI Dr. Case Barton ordered stat CT scan to assess for necrotizing fasciitis. Vitals stable, will keep you updated with results. Thanks.

## 2020-12-07 VITALS
RESPIRATION RATE: 15 BRPM | OXYGEN SATURATION: 97 % | DIASTOLIC BLOOD PRESSURE: 78 MMHG | TEMPERATURE: 98.7 F | HEART RATE: 68 BPM | SYSTOLIC BLOOD PRESSURE: 147 MMHG

## 2020-12-07 LAB
BASOPHILS ABSOLUTE: 0.1 K/UL (ref 0–0.2)
BASOPHILS RELATIVE PERCENT: 0.7 %
EOSINOPHILS ABSOLUTE: 0.3 K/UL (ref 0–0.6)
EOSINOPHILS RELATIVE PERCENT: 3.1 %
HCT VFR BLD CALC: 36 % (ref 40.5–52.5)
HEMOGLOBIN: 12.1 G/DL (ref 13.5–17.5)
LYMPHOCYTES ABSOLUTE: 2.4 K/UL (ref 1–5.1)
LYMPHOCYTES RELATIVE PERCENT: 22.8 %
MCH RBC QN AUTO: 29.7 PG (ref 26–34)
MCHC RBC AUTO-ENTMCNC: 33.6 G/DL (ref 31–36)
MCV RBC AUTO: 88.5 FL (ref 80–100)
MONOCYTES ABSOLUTE: 1.1 K/UL (ref 0–1.3)
MONOCYTES RELATIVE PERCENT: 10.2 %
NEUTROPHILS ABSOLUTE: 6.6 K/UL (ref 1.7–7.7)
NEUTROPHILS RELATIVE PERCENT: 63.2 %
PDW BLD-RTO: 13.2 % (ref 12.4–15.4)
PLATELET # BLD: 281 K/UL (ref 135–450)
PMV BLD AUTO: 7.2 FL (ref 5–10.5)
RBC # BLD: 4.07 M/UL (ref 4.2–5.9)
WBC # BLD: 10.5 K/UL (ref 4–11)

## 2020-12-07 PROCEDURE — 6360000002 HC RX W HCPCS: Performed by: INTERNAL MEDICINE

## 2020-12-07 PROCEDURE — 6370000000 HC RX 637 (ALT 250 FOR IP): Performed by: NURSE PRACTITIONER

## 2020-12-07 PROCEDURE — 36415 COLL VENOUS BLD VENIPUNCTURE: CPT

## 2020-12-07 PROCEDURE — 85025 COMPLETE CBC W/AUTO DIFF WBC: CPT

## 2020-12-07 PROCEDURE — 6370000000 HC RX 637 (ALT 250 FOR IP): Performed by: INTERNAL MEDICINE

## 2020-12-07 PROCEDURE — 99232 SBSQ HOSP IP/OBS MODERATE 35: CPT | Performed by: OTOLARYNGOLOGY

## 2020-12-07 RX ORDER — TRAMADOL HYDROCHLORIDE 50 MG/1
50 TABLET ORAL EVERY 6 HOURS PRN
Qty: 20 TABLET | Refills: 0 | Status: SHIPPED | OUTPATIENT
Start: 2020-12-07 | End: 2020-12-11 | Stop reason: ALTCHOICE

## 2020-12-07 RX ORDER — CLINDAMYCIN HYDROCHLORIDE 150 MG/1
450 CAPSULE ORAL 3 TIMES DAILY
Qty: 126 CAPSULE | Refills: 0 | Status: SHIPPED | OUTPATIENT
Start: 2020-12-07 | End: 2020-12-21

## 2020-12-07 RX ORDER — GREEN TEA/HOODIA GORDONII 315-12.5MG
1 CAPSULE ORAL 2 TIMES DAILY
Qty: 60 TABLET | Refills: 0 | Status: SHIPPED | OUTPATIENT
Start: 2020-12-07 | End: 2021-01-06

## 2020-12-07 RX ADMIN — POLYETHYLENE GLYCOL 3350 17 G: 17 POWDER, FOR SOLUTION ORAL at 12:24

## 2020-12-07 RX ADMIN — ENOXAPARIN SODIUM 40 MG: 40 INJECTION SUBCUTANEOUS at 09:26

## 2020-12-07 RX ADMIN — MUPIROCIN: 20 OINTMENT TOPICAL at 14:42

## 2020-12-07 RX ADMIN — GLIPIZIDE 10 MG: 5 TABLET ORAL at 06:15

## 2020-12-07 RX ADMIN — TRAMADOL HYDROCHLORIDE 100 MG: 50 TABLET, FILM COATED ORAL at 06:28

## 2020-12-07 RX ADMIN — MUPIROCIN: 20 OINTMENT TOPICAL at 09:26

## 2020-12-07 RX ADMIN — Medication 1.5 G: at 06:16

## 2020-12-07 ASSESSMENT — PAIN DESCRIPTION - PROGRESSION
CLINICAL_PROGRESSION: GRADUALLY WORSENING

## 2020-12-07 ASSESSMENT — PAIN SCALES - GENERAL
PAINLEVEL_OUTOF10: 7
PAINLEVEL_OUTOF10: 5

## 2020-12-07 NOTE — PROGRESS NOTES
Pt assessment completed and charted. VSS. Pt a/o. Pt rating pain 5/10. PRN medication administered per [facility] when needed. Pt reports feeling much better today. Bed in lowest position and wheels locked. Call light within reach. Bedside table within reach. Non-skid footwear in place. Pt denies any other needs at this time. Pt calls out appropriately. Will continue to monitor.

## 2020-12-07 NOTE — DISCHARGE SUMMARY
Hospital Medicine Discharge Summary    Patient ID: Ladonna Banks      Patient's PCP: Antonia Greco MD, MD    Admit Date: 12/4/2020     Discharge Date:   12/7/20    Admitting Physician: Sandip Leo MD     Discharge Physician: Gerardo Walters MD     Discharge Diagnoses: Active Hospital Problems    Diagnosis    Nasal abscess [J34.0]       The patient was seen and examined on day of discharge and this discharge summary is in conjunction with any daily progress note from day of discharge. Hospital Course:   54 Y M with a h/o DM2 and multiple previous MRSA infections s/p toe amputations and prolonged courses of abx who presented with pain, erythema, and edema of the skin on the bridge of his nose over 5 days.    Erythema spread to his forehead and maxillary areas and a purulent area developed.     The patient had CT facial bones which showed diffuse soft tissue swelling overlying the nose consistent with cellulitis, area of low attenuation superior near the bridge of the nose measures 1.2 x 1 cm suspicious for early abscess. CT neck soft tissue showed persistent soft tissue swelling overlying the nose, inferior forehead, and preseptal periorbital soft tissue discussed with cellulitis. He was admitted for cellulitis of the face.      MRSA cellulitis and 1.2 cm abscess of the nasal bridge.   -Patient had culture of abscess performed which grew out MRSA. He was placed on vancomycin. ENT was consulted. Recommendations were for warm compresses, intranasal mupirocin and clindamycin for 14 days. Patient is to follow-up  Thursday or Friday in office this week with ENT.     DM2  - A1c 6.8.  Continued glipizide.  Coverage insulin coverage while admitted.         Physical Exam Performed:     BP (!) 147/78   Pulse 68   Temp 98.7 °F (37.1 °C) (Oral)   Resp 15   SpO2 97%       General appearance:  No apparent distress, appears stated age and cooperative.   HEENT:  Normal cephalic, atraumatic without obvious deformity. Erythema surrounding mid to superior nasal bridge and extending into the left lower eyelid. Neck: Supple, with full range of motion. No jugular venous distention. Trachea midline. Respiratory:  Normal respiratory effort. Clear to auscultation, bilaterally without Rales/Wheezes/Rhonchi. Cardiovascular:  Regular rate and rhythm with normal S1/S2 without murmurs, rubs or gallops. Abdomen: Soft, non-tender, non-distended with normal bowel sounds. Musculoskeletal:  No clubbing, cyanosis or edema bilaterally. Full range of motion without deformity. Skin: Skin color, texture, turgor normal.  No rashes or lesions. Neurologic:  Neurovascularly intact without any focal sensory/motor deficits. Cranial nerves: II-XII intact, grossly non-focal.  Psychiatric:  Alert and oriented, thought content appropriate, normal insight      Labs: For convenience and continuity at follow-up the following most recent labs are provided:      CBC:    Lab Results   Component Value Date    WBC 10.5 12/07/2020    HGB 12.1 12/07/2020    HCT 36.0 12/07/2020     12/07/2020       Renal:    Lab Results   Component Value Date     12/06/2020    K 4.7 12/06/2020    CL 99 12/06/2020    CO2 27 12/06/2020    BUN 16 12/06/2020    CREATININE 0.9 12/06/2020    CALCIUM 9.1 12/06/2020         Significant Diagnostic Studies    Radiology:   CT SOFT TISSUE NECK W CONTRAST   Final Result   Persistent soft tissue swelling overlying the nose, inferior forehead, and   preseptal periorbital soft tissues consistent with cellulitis. No discrete   abscess. Similar appearing low-density area to the left of midline   compatible with phlegmon. CT FACIAL BONES W CONTRAST   Final Result   Diffuse soft tissue swelling overlying the nose consistent with cellulitis. Area of low attenuation superior near the bridge of the nose measures 1.2 x 1   cm suspicious for phlegmon/early abscess.                 Consults:     IP CONSULT TO OTOLARYNGOLOGY  IP CONSULT TO HOSPITALIST  IP CONSULT TO PHARMACY    Disposition: Home    Condition at Discharge: Stable    Discharge Instructions/Follow-up:    Case Barton, [address] [facility]  [address] [place] [address]  [phone]    On 12/11/2020  For wound re-check    Fabian Cerrato MD  [address]  [phone]    Schedule an appointment as soon as possible for a visit in 1 week  After hospital care       Code Status:  Full Code     Activity: activity as tolerated    Diet: diabetic diet      Discharge Medications:     Current Discharge Medication List           Details   traMADol (ULTRAM) 50 MG tablet Take 1 tablet by mouth every 6 hours as needed for Pain for up to 5 days. Qty: 20 tablet, Refills: 0    Comments: Reduce doses taken as pain becomes manageable  Associated Diagnoses: Facial abscess; Nasal abscess      mupirocin (BACTROBAN) 2 % ointment Apply topically 3 times daily. Qty: 1 Tube, Refills: 0      clindamycin (CLEOCIN) 150 MG capsule Take 3 capsules by mouth 3 times daily for 14 days  Qty: 126 capsule, Refills: 0      Probiotic Acidophilus (FLORANEX) TABS Take 1 tablet by mouth 2 times daily  Qty: 60 tablet, Refills: 0              Details   finasteride (PROSCAR) 5 MG tablet Take 1 tablet by mouth daily  Qty: 30 tablet, Refills: 3      tamsulosin (FLOMAX) 0.4 MG capsule Take 1 capsule by mouth daily  Qty: 30 capsule, Refills: 3      glipiZIDE (GLUCOTROL) 5 MG tablet Take 15 mg by mouth daily as needed      cyclobenzaprine (FLEXERIL) 10 MG tablet Take 10 mg by mouth 3 times daily as needed             Time Spent on discharge is more than 30 minutes in the examination, evaluation, counseling and review of medications and discharge plan. Signed:    Bebeto Katz MD   12/7/2020      Thank you Fabian Cerrato MD, MD for the opportunity to be involved in this patient's care.  If you have any questions or concerns please feel free to contact me at [phone].

## 2020-12-07 NOTE — PROGRESS NOTES
[address] [facility] SURGERY        Patient Name: Chris Osorio Record Number:  [id number]  Primary Care Physician:  Verónica Hidalgo MD, MD  Date of Consultation: 12/7/2020    Chief Complaint: Nasal swelling    Subjective: Patient seen and examined. Significant improvement in swelling and pain. Overall feels better. Denies HA, change in vision, nausea, vomiting. Has been using ointment and warm compresses. PHYSICAL EXAM  /71   Pulse 70   Temp 98.3 °F (36.8 °C) (Oral)   Resp 16   SpO2 97%     GENERAL: No Acute Distress, Alert and Oriented, no hoarseness  EYES: EOMI right, increased left periorbital edema- unable to open left eye  NOSE: Improvement in erythema. No crusting of right nasal tip- no purulence expressed with manipulation. Nasal dorsum draining serosanginous- no purulence.    EARS: Normal external canal appearance, EAC patent bilaterally  FACE: Significant improvement in swelling of forehead and left eye 1/6 House-Brackmann Scale, symmetric, sensation equal bilaterally  ORAL CAVITY: No masses or lesions palpated, uvula is midline, moist mucous membranes  NECK: Normal range of motion, no thyromegaly, trachea is midline, no lymphadenopathy, no neck masses, no crepitus  CHEST: Normal respiratory effort, no retractions, breathing comfortably  SKIN: No rashes, normal appearing skin, no evidence of skin lesions/tumors    RADIOLOGY  Summary of findings:  CT soft tissue neck 12/4: cellulitic changes to nose as well as phlegmon/abscess of right nasal tip and nasal dorsum    PROCEDURE  N/A    ASSESSMENT/PLAN  Rubia White is a very pleasant 54 y.o. male with abscess of nasal tip and facial cellulitis now with worsening edema    -Improving  -Continue warm compresses, mupirocin intranasal and to crusting of nasal dorsum  -cultures: MRSA- sensitivities pending  -Continue tight glycemic control  -Okay to d/c from ENT perspective with MRSA coverage- previously sensitive to Clindamycin x 14 days  -Follow up in office on Thursday or Friday  -If remains in house overnight will see again in AM    Please page with any questions or concerns    Renny Griffiths, DO  Otolaryngology    Medical Decision Making:   The following items were considered in medical decision making:  Independent review of images  Review / order clinical lab tests  Review / order radiology tests  Decision to obtain old records

## 2020-12-07 NOTE — PROGRESS NOTES
Full range of motion without deformity. Skin: Skin color, texture, turgor normal.  erythema greatly improved. No longer purulent drainage, now serosanguinous. L periorbital edema worse. Neurologic:  Neurovascularly intact without any focal sensory/motor deficits. Cranial nerves: II-XII intact, grossly non-focal.  Psychiatric:  Alert and oriented, thought content appropriate, normal insight      Labs:   Recent Labs     12/05/20  0706 12/06/20  0639 12/07/20  0608   WBC 10.5 12.8* 10.5   HGB 12.5* 12.6* 12.1*   HCT 37.5* 38.2* 36.0*    279 281     Recent Labs     12/05/20  0706 12/06/20  0639    134*   K 4.7 4.7    99   CO2 28 27   BUN 17 16   CREATININE 1.0 0.9   CALCIUM 9.0 9.1     No results for input(s): AST, ALT, BILIDIR, BILITOT, ALKPHOS in the last 72 hours. No results for input(s): INR in the last 72 hours. No results for input(s): [place] in the last 72 hours. Urinalysis:      Lab Results   Component Value Date    NITRU Negative 07/08/2019    WBCUA 0-2 07/08/2019    RBCUA 10-20 07/08/2019    BLOODU SMALL 07/08/2019    SPECGRAV <=1.005 07/08/2019    GLUCOSEU Negative 07/08/2019       Radiology:  CT SOFT TISSUE NECK W CONTRAST   Final Result   Persistent soft tissue swelling overlying the nose, inferior forehead, and   preseptal periorbital soft tissues consistent with cellulitis. No discrete   abscess. Similar appearing low-density area to the left of midline   compatible with phlegmon. CT FACIAL BONES W CONTRAST   Final Result   Diffuse soft tissue swelling overlying the nose consistent with cellulitis. Area of low attenuation superior near the bridge of the nose measures 1.2 x 1   cm suspicious for phlegmon/early abscess. Assessment/Plan:    Active Hospital Problems    Diagnosis    Nasal abscess [J34.0]     MRSA cellulitis and 1.2 cm abscess of the nasal bridge.   -Continue  -Continue warm compresses.   -Continue intranasal Mupirocin. -Appreciate ENT input. Recommendations for clindamycin for 14 days and follow-up Thursday or Friday in office this week.     DM2  - A1c 6.8. Continue glipizide. Continue insulin coverage while admitted.       DVT Prophylaxis: enoxaparin  Diet: DIET CARB CONTROL;  Code Status: Full Code    PT/OT Eval Status: not indicated    [facility] today      Alber Lacey MD

## 2020-12-07 NOTE — CARE COORDINATION
Per RN and IM, pt has no DCP needs. CM observed pt walking to and from the bathroom w/no issues with ambulation. Pt a/o, able to ambulate, has PCP and insurance. Pt has not DCP needs at this time. CM acknowledges d/c order. Should needs arise, please contact DCP.   Neel Salamanca RN

## 2020-12-07 NOTE — PLAN OF CARE
Problem: Pain:  Goal: Pain level will decrease  Description: Pain level will decrease  Outcome: Completed

## 2020-12-08 LAB
BLOOD CULTURE, ROUTINE: NORMAL
CULTURE, BLOOD 2: NORMAL

## 2020-12-09 LAB
ANAEROBIC CULTURE: ABNORMAL
GRAM STAIN RESULT: ABNORMAL
ORGANISM: ABNORMAL
WOUND/ABSCESS: ABNORMAL

## 2020-12-11 ENCOUNTER — OFFICE VISIT (OUTPATIENT)
Dept: ENT CLINIC | Age: 55
End: 2020-12-11
Payer: COMMERCIAL

## 2020-12-11 VITALS
WEIGHT: 237 LBS | BODY MASS INDEX: 35.1 KG/M2 | HEART RATE: 76 BPM | HEIGHT: 69 IN | DIASTOLIC BLOOD PRESSURE: 78 MMHG | TEMPERATURE: 97.9 F | SYSTOLIC BLOOD PRESSURE: 146 MMHG

## 2020-12-11 PROCEDURE — G8419 CALC BMI OUT NRM PARAM NOF/U: HCPCS | Performed by: OTOLARYNGOLOGY

## 2020-12-11 PROCEDURE — 1111F DSCHRG MED/CURRENT MED MERGE: CPT | Performed by: OTOLARYNGOLOGY

## 2020-12-11 PROCEDURE — 2022F DILAT RTA XM EVC RTNOPTHY: CPT | Performed by: OTOLARYNGOLOGY

## 2020-12-11 PROCEDURE — 1036F TOBACCO NON-USER: CPT | Performed by: OTOLARYNGOLOGY

## 2020-12-11 PROCEDURE — G8484 FLU IMMUNIZE NO ADMIN: HCPCS | Performed by: OTOLARYNGOLOGY

## 2020-12-11 PROCEDURE — 3044F HG A1C LEVEL LT 7.0%: CPT | Performed by: OTOLARYNGOLOGY

## 2020-12-11 PROCEDURE — G8427 DOCREV CUR MEDS BY ELIG CLIN: HCPCS | Performed by: OTOLARYNGOLOGY

## 2020-12-11 PROCEDURE — 99212 OFFICE O/P EST SF 10 MIN: CPT | Performed by: OTOLARYNGOLOGY

## 2020-12-11 PROCEDURE — 3017F COLORECTAL CA SCREEN DOC REV: CPT | Performed by: OTOLARYNGOLOGY

## 2020-12-11 ASSESSMENT — ENCOUNTER SYMPTOMS
SINUS PRESSURE: 0
EYE ITCHING: 0
VOICE CHANGE: 0
FACIAL SWELLING: 0
SHORTNESS OF BREATH: 0
TROUBLE SWALLOWING: 0
COUGH: 0
APNEA: 0
SORE THROAT: 0

## 2020-12-11 NOTE — PROGRESS NOTES
[address], [address], [facility]  P: [phone]       Patient     Duglas Clakr  1965    ChiefComplaint     Chief Complaint   Patient presents with    Other     Patient had I&D of a bridge of nose abscess on 12/4/2020. He states the inciision site is healing nicely        History of Present Illness     Shashi Ruelas is a 40-year-old male here for hospital follow-up. He was seen by me in the hospital for nasal cellulitis with nasal abscess. He is doing well. Reports continued improvement in nasal swelling and pain. Has been using mupirocin ointment as prescribed as well as the oral antibiotic. No complaints today. States his sugars have been well controlled. Past Medical History     Past Medical History:   Diagnosis Date    Diabetes mellitus ([address]     Foot ulcer ([address]     Right foot/diabetic    MRSA (methicillin resistant staph aureus) culture positive 09/03/2020    foot    MRSA (methicillin resistant staph aureus) culture positive 12/04/2020    nasal bridge       Past Surgical History     Past Surgical History:   Procedure Laterality Date    ANKLE SURGERY      FOOT DEBRIDEMENT Right 7/11/2019    RIGHT FOOT  INCISION AND DRAINAGE WITH 5TH METATARSAL BONE RESECTION performed by Misael Elkins DPM at [id number] [place] [address]      right ankle    TOE AMPUTATION Right 7/8/2019    incision and drainage right foot with possible partial 5th ray amputation performed by Misael Elkins DPM at [facility] TOE AMPUTATION Left 9/4/2020    AMPUTATION FIFTH TOE, AND FIFTH METATARSAL HEAD OF LEFT FOOT WITH INCISION AND DEBRIDEMENT (PULSEVAC WITH ANTIBIOTIC, WANTS SAGITTAL SAW) performed by Selvin Mcdonald DPM at [address] [place] [address] History     No family history on file. Social History     Social History     Tobacco Use    Smoking status: Never Smoker    Smokeless tobacco: Never Used   Substance Use Topics    Alcohol use: No    Drug use:  No Allergies     Allergies   Allergen Reactions    Metformin And Related Diarrhea       Medications     Current Outpatient Medications   Medication Sig Dispense Refill    mupirocin (BACTROBAN) 2 % ointment Apply topically 3 times daily. 1 Tube 0    clindamycin (CLEOCIN) 150 MG capsule Take 3 capsules by mouth 3 times daily for 14 days 126 capsule 0    Probiotic Acidophilus (FLORANEX) TABS Take 1 tablet by mouth 2 times daily 60 tablet 0    glipiZIDE (GLUCOTROL) 5 MG tablet Take 15 mg by mouth daily as needed      cyclobenzaprine (FLEXERIL) 10 MG tablet Take 10 mg by mouth 3 times daily as needed       No current facility-administered medications for this visit. Review of Systems     Review of Systems   Constitutional: Negative for appetite change, chills, fatigue, fever and unexpected weight change. HENT: Negative for congestion, ear discharge, ear pain, facial swelling, hearing loss, nosebleeds, postnasal drip, sinus pressure, sneezing, sore throat, tinnitus, trouble swallowing and voice change. Eyes: Negative for itching. Respiratory: Negative for apnea, cough and shortness of breath. Gastrointestinal:        Negative for dysphasia   Endocrine: Negative for cold intolerance and heat intolerance. Musculoskeletal: Negative for myalgias and neck pain. Skin: Negative for rash. Allergic/Immunologic: Negative for environmental allergies. Neurological: Negative for dizziness and headaches. Psychiatric/Behavioral: Negative for confusion, decreased concentration and sleep disturbance. PhysicalExam     Vitals:    12/11/20 1435   BP: (!) 146/78   Site: Right Upper Arm   Position: Sitting   Pulse: 76   Temp: 97.9 °F (36.6 °C)   Weight: 237 lb (107.5 kg)   Height: 5' 9\" (1.753 m)       Constitutional:       Appearance: Normal appearance. HENT:      Head: Normocephalic. Nose: Significant improvement in swelling of nasal dorsum.   Minimal crusting persist without any underlying fluctuance. Right nasal vestibule without crusting and remucosalized. Eyes:      Extraocular Movements: Extraocular movements intact. Neck:      Musculoskeletal: Normal range of motion. Neurological:      General: No focal deficit present. Mental Status: She is alert and oriented to person, place, and time. Psychiatric:         Mood and Affect: Mood normal.         Behavior: Behavior normal.         Thought Content: Thought content normal.         Assessment and Plan     1. Nasal abscess  -Resolved    2. Cellulitis of nose  -Significant improvement  -Advised him to continue mupirocin ointment to bridge of nose until crusting has resolved  -Take oral antibiotics until course completed  -Continue tight glycemic control    3. Uncontrolled type 2 diabetes mellitus with hyperglycemia ([address]  -States blood sugars have been well controlled      Return as needed    Pee Smart, DO  12/11/20      Portions of this note were dictated using Dragon.  There may be linguistic errors secondary to the use of this program.

## 2021-01-14 ENCOUNTER — APPOINTMENT (OUTPATIENT)
Dept: GENERAL RADIOLOGY | Age: 56
End: 2021-01-14
Payer: COMMERCIAL

## 2021-01-14 ENCOUNTER — APPOINTMENT (OUTPATIENT)
Dept: CT IMAGING | Age: 56
End: 2021-01-14
Payer: COMMERCIAL

## 2021-01-14 ENCOUNTER — HOSPITAL ENCOUNTER (EMERGENCY)
Age: 56
Discharge: HOME | End: 2021-01-15
Payer: COMMERCIAL

## 2021-01-14 DIAGNOSIS — R50.9 FEVER, UNSPECIFIED FEVER CAUSE: ICD-10-CM

## 2021-01-14 DIAGNOSIS — R07.89 CHEST PRESSURE: ICD-10-CM

## 2021-01-14 DIAGNOSIS — R06.02 SHORTNESS OF BREATH: ICD-10-CM

## 2021-01-14 DIAGNOSIS — R11.2 NON-INTRACTABLE VOMITING WITH NAUSEA, UNSPECIFIED VOMITING TYPE: ICD-10-CM

## 2021-01-14 DIAGNOSIS — Z20.822 SUSPECTED 2019 NOVEL CORONAVIRUS INFECTION: Primary | ICD-10-CM

## 2021-01-14 DIAGNOSIS — R05.9 COUGH: ICD-10-CM

## 2021-01-14 LAB
A/G RATIO: 1.1 (ref 1.1–2.2)
ALBUMIN SERPL-MCNC: 3.9 G/DL (ref 3.4–5)
ALP BLD-CCNC: 124 U/L (ref 40–129)
ALT SERPL-CCNC: 27 U/L (ref 10–40)
ANION GAP SERPL CALCULATED.3IONS-SCNC: 10 MMOL/L (ref 3–16)
AST SERPL-CCNC: 26 U/L (ref 15–37)
BASOPHILS ABSOLUTE: 0 K/UL (ref 0–0.2)
BASOPHILS RELATIVE PERCENT: 0.4 %
BILIRUB SERPL-MCNC: 0.4 MG/DL (ref 0–1)
BUN BLDV-MCNC: 15 MG/DL (ref 7–20)
CALCIUM SERPL-MCNC: 9.1 MG/DL (ref 8.3–10.6)
CHLORIDE BLD-SCNC: 100 MMOL/L (ref 99–110)
CO2: 25 MMOL/L (ref 21–32)
CREAT SERPL-MCNC: 1.1 MG/DL (ref 0.9–1.3)
EOSINOPHILS ABSOLUTE: 0 K/UL (ref 0–0.6)
EOSINOPHILS RELATIVE PERCENT: 0.4 %
GFR AFRICAN AMERICAN: >60
GFR NON-AFRICAN AMERICAN: >60
GLOBULIN: 3.6 G/DL
GLUCOSE BLD-MCNC: 165 MG/DL (ref 70–99)
HCT VFR BLD CALC: 43.9 % (ref 40.5–52.5)
HEMOGLOBIN: 14.6 G/DL (ref 13.5–17.5)
LACTIC ACID: 0.9 MMOL/L (ref 0.4–2)
LYMPHOCYTES ABSOLUTE: 0.9 K/UL (ref 1–5.1)
LYMPHOCYTES RELATIVE PERCENT: 12.5 %
MCH RBC QN AUTO: 29.6 PG (ref 26–34)
MCHC RBC AUTO-ENTMCNC: 33.3 G/DL (ref 31–36)
MCV RBC AUTO: 89 FL (ref 80–100)
MONOCYTES ABSOLUTE: 0.5 K/UL (ref 0–1.3)
MONOCYTES RELATIVE PERCENT: 6.7 %
NEUTROPHILS ABSOLUTE: 6 K/UL (ref 1.7–7.7)
NEUTROPHILS RELATIVE PERCENT: 80 %
PDW BLD-RTO: 13.8 % (ref 12.4–15.4)
PLATELET # BLD: 195 K/UL (ref 135–450)
PMV BLD AUTO: 7.7 FL (ref 5–10.5)
POTASSIUM SERPL-SCNC: 4.7 MMOL/L (ref 3.5–5.1)
RBC # BLD: 4.93 M/UL (ref 4.2–5.9)
SODIUM BLD-SCNC: 135 MMOL/L (ref 136–145)
TOTAL PROTEIN: 7.5 G/DL (ref 6.4–8.2)
TROPONIN: <0.01 NG/ML
WBC # BLD: 7.5 K/UL (ref 4–11)

## 2021-01-14 PROCEDURE — 6370000000 HC RX 637 (ALT 250 FOR IP): Performed by: NURSE PRACTITIONER

## 2021-01-14 PROCEDURE — 85025 COMPLETE CBC W/AUTO DIFF WBC: CPT

## 2021-01-14 PROCEDURE — 96375 TX/PRO/DX INJ NEW DRUG ADDON: CPT

## 2021-01-14 PROCEDURE — 99284 EMERGENCY DEPT VISIT MOD MDM: CPT

## 2021-01-14 PROCEDURE — 83605 ASSAY OF LACTIC ACID: CPT

## 2021-01-14 PROCEDURE — 6360000002 HC RX W HCPCS: Performed by: NURSE PRACTITIONER

## 2021-01-14 PROCEDURE — 84484 ASSAY OF TROPONIN QUANT: CPT

## 2021-01-14 PROCEDURE — 71045 X-RAY EXAM CHEST 1 VIEW: CPT

## 2021-01-14 PROCEDURE — 80053 COMPREHEN METABOLIC PANEL: CPT

## 2021-01-14 PROCEDURE — U0003 INFECTIOUS AGENT DETECTION BY NUCLEIC ACID (DNA OR RNA); SEVERE ACUTE RESPIRATORY SYNDROME CORONAVIRUS 2 (SARS-COV-2) (CORONAVIRUS DISEASE [COVID-19]), AMPLIFIED PROBE TECHNIQUE, MAKING USE OF HIGH THROUGHPUT TECHNOLOGIES AS DESCRIBED BY CMS-2020-01-R: HCPCS

## 2021-01-14 PROCEDURE — 6360000004 HC RX CONTRAST MEDICATION: Performed by: NURSE PRACTITIONER

## 2021-01-14 PROCEDURE — 2580000003 HC RX 258: Performed by: NURSE PRACTITIONER

## 2021-01-14 PROCEDURE — 74177 CT ABD & PELVIS W/CONTRAST: CPT

## 2021-01-14 PROCEDURE — 96374 THER/PROPH/DIAG INJ IV PUSH: CPT

## 2021-01-14 RX ORDER — FLASH GLUCOSE SENSOR
KIT MISCELLANEOUS
COMMUNITY
Start: 2020-12-28 | End: 2021-08-24

## 2021-01-14 RX ORDER — ONDANSETRON 2 MG/ML
4 INJECTION INTRAMUSCULAR; INTRAVENOUS EVERY 30 MIN PRN
Status: DISCONTINUED | OUTPATIENT
Start: 2021-01-14 | End: 2021-01-15 | Stop reason: HOSPADM

## 2021-01-14 RX ORDER — ACETAMINOPHEN 500 MG
1000 TABLET ORAL ONCE
Status: COMPLETED | OUTPATIENT
Start: 2021-01-14 | End: 2021-01-14

## 2021-01-14 RX ORDER — 0.9 % SODIUM CHLORIDE 0.9 %
1000 INTRAVENOUS SOLUTION INTRAVENOUS ONCE
Status: COMPLETED | OUTPATIENT
Start: 2021-01-14 | End: 2021-01-14

## 2021-01-14 RX ORDER — KETOROLAC TROMETHAMINE 30 MG/ML
30 INJECTION, SOLUTION INTRAMUSCULAR; INTRAVENOUS ONCE
Status: COMPLETED | OUTPATIENT
Start: 2021-01-14 | End: 2021-01-14

## 2021-01-14 RX ORDER — FLASH GLUCOSE SENSOR
KIT MISCELLANEOUS
COMMUNITY
Start: 2020-09-22 | End: 2021-08-24

## 2021-01-14 RX ADMIN — ACETAMINOPHEN 1000 MG: 500 TABLET ORAL at 21:03

## 2021-01-14 RX ADMIN — SODIUM CHLORIDE 1000 ML: 9 INJECTION, SOLUTION INTRAVENOUS at 21:03

## 2021-01-14 RX ADMIN — IOPAMIDOL 75 ML: 755 INJECTION, SOLUTION INTRAVENOUS at 21:33

## 2021-01-14 RX ADMIN — ONDANSETRON 4 MG: 2 INJECTION INTRAMUSCULAR; INTRAVENOUS at 21:34

## 2021-01-14 RX ADMIN — KETOROLAC TROMETHAMINE 30 MG: 30 INJECTION, SOLUTION INTRAMUSCULAR at 21:03

## 2021-01-14 ASSESSMENT — PAIN DESCRIPTION - PAIN TYPE: TYPE: ACUTE PAIN

## 2021-01-14 ASSESSMENT — PAIN SCALES - GENERAL
PAINLEVEL_OUTOF10: 5
PAINLEVEL_OUTOF10: 5

## 2021-01-15 VITALS
DIASTOLIC BLOOD PRESSURE: 89 MMHG | SYSTOLIC BLOOD PRESSURE: 101 MMHG | OXYGEN SATURATION: 100 % | RESPIRATION RATE: 20 BRPM | TEMPERATURE: 99.2 F | WEIGHT: 215 LBS | HEART RATE: 58 BPM | BODY MASS INDEX: 31.75 KG/M2

## 2021-01-15 LAB — SARS-COV-2, PCR: DETECTED

## 2021-01-15 RX ORDER — GUAIFENESIN AND CODEINE PHOSPHATE 100; 10 MG/5ML; MG/5ML
5 SOLUTION ORAL 2 TIMES DAILY PRN
Qty: 180 ML | Refills: 0 | Status: SHIPPED | OUTPATIENT
Start: 2021-01-15 | End: 2021-02-02

## 2021-01-15 RX ORDER — ONDANSETRON 4 MG/1
4 TABLET, ORALLY DISINTEGRATING ORAL EVERY 8 HOURS PRN
Qty: 20 TABLET | Refills: 0 | Status: SHIPPED | OUTPATIENT
Start: 2021-01-15 | End: 2021-08-24

## 2021-01-15 NOTE — ED NOTES
Pt given home pulse ox. Instructed on use and verbalized understanding.       Marisol Watson RN  01/15/21 0735

## 2021-01-15 NOTE — ED NOTES
Ambulated Patient on room air about 200 feet. Patients spo2 stayed at 96% and pulse went from 74-90 at the highest. ED RN Fleming Runner aware.             Magaly Colindres  01/14/21 6614

## 2021-01-15 NOTE — ED PROVIDER NOTES
Evaluated by Advanced Practice Provider    EMERGENCY DEPARTMENT ENCOUNTER      CHIEFCOMPLAINT  Shortness of Breath (fever and vomiting)    HPI    Ashley Mendiola is a 54 y.o. male who presents to the emergency department with complaints of SOB, fever, vomiting. Monday not feeling well-cough. Progressively worse from Monday to today, cough is worse, sometimes productive of nasty green stuff, fever, SOB, chest pressure and feeling like there is something pushing his kidneys through the front. Today he had some diarrhea. Reports abdominal pain in the middle, having nausea and vomiting-3 times today. Called PCP and they told him to come her. He is a diabetic. Has tried to take tylenol and Nyquil, no real help. No ill contacts, no known COVID exposure. No chronic lung disorders, does not wear oxygen. The patient arrived to the ED via private car.     PAST MEDICAL HISTORY    Past Medical History:   Diagnosis Date    Diabetes mellitus ([address]     Foot ulcer ([address]     Right foot/diabetic    MRSA (methicillin resistant staph aureus) culture positive 09/03/2020    foot    MRSA (methicillin resistant staph aureus) culture positive 12/04/2020    nasal bridge       SURGICAL HISTORY    Past Surgical History:   Procedure Laterality Date    ANKLE SURGERY      FOOT DEBRIDEMENT Right 7/11/2019    RIGHT FOOT  INCISION AND DRAINAGE WITH 5TH METATARSAL BONE RESECTION performed by Magali Shah DPM at [facility]      right ankle    TOE AMPUTATION Right 7/8/2019    incision and drainage right foot with possible partial 5th ray amputation performed by Magali Shah DPM at [facility] TOE AMPUTATION Left 9/4/2020    AMPUTATION FIFTH TOE, AND FIFTH METATARSAL HEAD OF LEFT FOOT WITH INCISION AND DEBRIDEMENT ([address] WITH ANTIBIOTIC, WANTS SAGITTAL SAW) performed by Katie Austin DPM at [address]    Current Outpatient Rx   Medication Sig Dispense Refill    guaiFENesin-codeine (TUSSI-ORGANIDIN NR) 100-10 MG/5ML syrup Take 5 mLs by mouth 2 times daily as needed for Cough for up to 18 days. 180 mL 0    ondansetron (ZOFRAN ODT) 4 MG disintegrating tablet Take 1 tablet by mouth every 8 hours as needed for Nausea 20 tablet 0    Continuous Blood Gluc Sensor (FREESTYLE RUDI 14 DAY SENSOR) MISC Apply every 14 days to check glucose as needed.  Continuous Blood Gluc Sensor (FREESTYLE RUDI 14 DAY SENSOR) MISC       glipiZIDE (GLUCOTROL) 5 MG tablet Take 15 mg by mouth daily as needed      cyclobenzaprine (FLEXERIL) 10 MG tablet Take 10 mg by mouth 3 times daily as needed         ALLERGIES    Allergies   Allergen Reactions    Metformin And Related Diarrhea       FAMILY HISTORY    History reviewed. No pertinent family history.     SOCIAL HISTORY    Social History     Socioeconomic History    Marital status:      Spouse name: None    Number of children: None    Years of education: None    Highest education level: None   Occupational History    None   Social Needs    Financial resource strain: None    Food insecurity     Worry: None     Inability: None    Transportation needs     Medical: None     Non-medical: None   Tobacco Use    Smoking status: Never Smoker    Smokeless tobacco: Never Used   Substance and Sexual Activity    Alcohol use: No    Drug use: No    Sexual activity: Not Currently   Lifestyle    Physical activity     Days per week: None     Minutes per session: None    Stress: None   Relationships    Social connections     Talks on phone: None     Gets together: None     Attends Sikh service: None     Active member of club or organization: None     Attends meetings of clubs or organizations: None     Relationship status: None    Intimate partner violence     Fear of current or ex partner: None     Emotionally abused: None     Physically abused: None     Forced sexual activity: None   Other Topics Concern    None   Social History Narrative    None REVIEW OF SYSTEMS    10 systems reviewed, pertinent positives per HPI otherwise noted to be negative    PHYSICAL EXAM  Physical Exam  Vitals:    01/15/21 0008   BP: 101/89   Pulse: 58   Resp:    Temp:    SpO2: 100%     GENERAL: Patient is well-developed, well-nourished. Awake and alert. Cooperative. Resting in bed. No apparent distress. HEENT:  Normocephalic, atraumatic. Conjunctiva appear normal. Sclera is non-icteric. External ears are normal.    NECK: Supple with normal ROM. Trachea midline  LUNGS: Equal and symmetric chest rise. Breathing is unlabored. Speaking comfortably in full sentences. Lungs are clear bilaterally to auscultation. Without wheezing, rales, or rhonchi. CADIOVASCULAR:  Regular rate and rhythm. Normal S1-S2 sounds. No murmurs, rubs, or gallops. Capillary refill is brisk in all 4extremities. Bilateral lower extremities are equal in size, there is no swelling observed. There is no tenderness to palpation. There is no erythema observed or warmth palpated. GI: Soft, nontender, nondistended with positive bowelsounds. No rebound tenderness, guarding or any peritoneal signs. No masses or hepatosplenomegaly   MUSCULOSKELETAL:  No gross deformities or trauma noted. Moving allextremities equally and appropriately. Normal ROM. SKIN: Warm/dry. Skin is intact. Norashes/lesions noted. PSYCHIATRIC: Mood and affect appropriate. Speech is clear andarticulate. NEUROLOGIC: Alert and oriented. No focal motor or sensory deficits. LABS  I havereviewed all labs for this visit.    Results for orders placed or performed during the hospital encounter of 01/14/21   CBC Auto Differential   Result Value Ref Range    WBC 7.5 4.0 - 11.0 K/uL    RBC 4.93 4.20 - 5.90 M/uL    Hemoglobin 14.6 13.5 - 17.5 g/dL    Hematocrit 43.9 40.5 - 52.5 %    MCV 89.0 80.0 - 100.0 fL    MCH 29.6 26.0 - 34.0 pg    MCHC 33.3 31.0 - 36.0 g/dL    RDW 13.8 12.4 - 15.4 %    Platelets 329 653 - 775 K/uL    MPV 7.7 5.0 - 10.5 fL    Neutrophils % 80.0 %    Lymphocytes % 12.5 %    Monocytes % 6.7 %    Eosinophils % 0.4 %    Basophils % 0.4 %    Neutrophils Absolute 6.0 1.7 - 7.7 K/uL    Lymphocytes Absolute 0.9 (L) 1.0 - 5.1 K/uL    Monocytes Absolute 0.5 0.0 - 1.3 K/uL    Eosinophils Absolute 0.0 0.0 - 0.6 K/uL    Basophils Absolute 0.0 0.0 - 0.2 K/uL   Comprehensive Metabolic Panel   Result Value Ref Range    Sodium 135 (L) 136 - 145 mmol/L    Potassium 4.7 3.5 - 5.1 mmol/L    Chloride 100 99 - 110 mmol/L    CO2 25 21 - 32 mmol/L    Anion Gap 10 3 - 16    Glucose 165 (H) 70 - 99 mg/dL    BUN 15 7 - 20 mg/dL    CREATININE 1.1 0.9 - 1.3 mg/dL    GFR Non-African American >60 >60    GFR African American >60 >60    Calcium 9.1 8.3 - 10.6 mg/dL    Total Protein 7.5 6.4 - 8.2 g/dL    Alb 3.9 3.4 - 5.0 g/dL    Albumin/Globulin Ratio 1.1 1.1 - 2.2    Total Bilirubin 0.4 0.0 - 1.0 mg/dL    Alkaline Phosphatase 124 40 - 129 U/L    ALT 27 10 - 40 U/L    AST 26 15 - 37 U/L    Globulin 3.6 g/dL   Lactic Acid, Plasma   Result Value Ref Range    Lactic Acid 0.9 0.4 - 2.0 mmol/L   Troponin   Result Value Ref Range    Troponin <0.01 <0.01 ng/mL       RADIOLOGY    Ct Abdomen Pelvis W Iv Contrast Additional Contrast? None    Result Date: 1/14/2021  EXAMINATION: CT OF THE ABDOMEN AND PELVIS WITH CONTRAST 1/14/2021 9:19 pm TECHNIQUE: CT of the abdomen and pelvis was performed with the administration of intravenous contrast. Multiplanar reformatted images are provided for review. Dose modulation, iterative reconstruction, and/or weight based adjustment of the mA/kV was utilized to reduce the radiation dose to as low as reasonably achievable. COMPARISON: None.  HISTORY: ORDERING SYSTEM PROVIDED HISTORY: abdominal pain TECHNOLOGIST PROVIDED HISTORY: Reason for exam:->abdominal pain Additional Contrast?->None Reason for Exam: headache, fever, nausea, vomiting, abdominal pain and back pain Acuity: Acute Type of Exam: Initial FINDINGS: Lower Chest: Scattered ground-glass nodular opacities in the lower lungs compatible with atypical infectious process. Organs: Liver, spleen, pancreas, adrenal glands, and kidneys within normal limits. No radiodense gallstones. GI/Bowel: No bowel obstruction. Normal appendix. Pelvis: Urinary bladder and prostate gland unremarkable. Peritoneum/Retroperitoneum: No free air, fluid, or lymphadenopathy. Bones/Soft Tissues: No acute osseous abnormality. Degenerative changes in the spine. No CT evidence of acute intra-abdominal process. Scattered ground-glass nodular opacities in the lower lungs compatible with atypical infectious process. Xr Chest Portable    Result Date: 1/14/2021  EXAMINATION: ONE XRAY VIEW OF THE CHEST 1/14/2021 7:48 pm COMPARISON: September 2, 2020 HISTORY: ORDERING SYSTEM PROVIDED HISTORY: sob TECHNOLOGIST PROVIDED HISTORY: Reason for exam:->sob Reason for Exam: sob Acuity: Acute Type of Exam: Initial FINDINGS: Cardiac silhouette is normal in size. Lungs appear clear. No acute bony abnormality. No acute findings     ED COURSE/MDM  Patient seen and evaluated. Old records reviewed. Diagnostic testing reviewed and results discussed. I have evaluated this patient. My supervising physician was available for consultation. Ashley Mendiola presented to the ED with the above noted complaints. Physical exam reveals no adventitious breath sounds and generalized abdominal tenderness to palpation. Blood work shows no leukocytosis however there is a absolute lymphocyte count that is decreased at 0.9. No further differential shift. No anemia. No significant electrolyte abnormality. No evidence of acute kidney injury or transaminitis. Lactic acid level is normal.  Troponin is negative. Chest x-ray is without acute findings.     I did obtain a CT abdomen pelvis due to the reproducible abdominal pain and this was without acute intra-abdominal process however there is scattered groundglass nodular opacities in the lower lungs compatible with atypical infectious process. Pt was given the following medications ortreatments in the ED:   Medications   ondansetron (ZOFRAN) injection 4 mg (4 mg Intravenous Given 1/14/21 2134)   0.9 % sodium chloride bolus (0 mLs Intravenous Stopped 1/14/21 2252)   ketorolac (TORADOL) injection 30 mg (30 mg Intravenous Given 1/14/21 2103)   acetaminophen (TYLENOL) tablet 1,000 mg (1,000 mg Oral Given 1/14/21 2103)   iopamidol (ISOVUE-370) 76 % injection 75 mL (75 mLs Intravenous Given 1/14/21 2133)     Patient was given a 1 L IV fluid bolus as well as Toradol and Tylenol. He was given Zofran. Upon reevaluation he did report improvement of his symptoms. I discussed with him that he likely has Covid and we will do a Covid PCR swab prior to discharge. Patient was ambulated in the ED prior to discharge and was able to maintain his O2 saturations without difficulty. I will discharge the patient with prescription for cough medication as well as medication for nausea vomiting. He is being discharged with a pulse ox was instructed on how to use this and when to return to the ED. CMT Macro: COVID-19 Risk of Decompensation in the next 24 hours  Initial Eval  1. Respiratory distress? (clinical judgment) No   2. Ill appearing? No   3. Acute Mental Status Change? No   4. If AGE >= 65 yo with resting tachycardia > 100 bpm? No   5. SBP < 100? No   6. DBP < 60? No   7. NEW oxygen requirement? No   8. Social situation prohibits safe discharge? No   If all of the above are 'NO' then further risk stratification is required:If all of the above are \"No\" then further risk stratification is required     PMH risk stratification  1. Age 52-64 yo --> + 2 pts   2. If age <= 62 yo with resting tachycardia > 100 bpm: No = 0 pts  3. [place] No = 0 pts  4. Non-exertional dyspnea or severe decline in exercise tolerance No = 0 pts  5. Duration of symptoms <= 4 days --> 0 pts    6.  Male yes --> + 1 pt 7. hx of CAD no --> 0 pts  8. hx of DM yes --> + 1 pt  9. hx of CVA or TIA no --> 0 pts  10. Chronic lung disease (excluding asthma) no --> 0 pts  11. Chronic kidney disease no --> 0 pts  12. Chronic liver disease no --> 0 pts  13. Obesity (defined as BMI >= 30)yes --> + 1 pt  12. ACTIVE cancer no --> 0 pts  13. Immunosuppression no --> 0 pts  TOTAL score: 0-8 pts --> Low Risk: consider portable CXR. Discharge after focused discussion with strict verbal & written ED return precautions    Labs/imaging risk stratification   CXR shows diffuse patchy/multi-lobar pneumonia Yes  Troponin elevated No  Lactate elevated No  If all above are NO then low risk --> d/c home    MDM for Discharge  Based on history, physical exam, and testing, this patient is low risk for acute decompensation in the setting of confirmed or suspected COVID-19. Low risk can be solidly predicted when the patient has one of the following scenarios:  1. Younger age, no risk factors and a reassuring clinical picture  2. Younger age, risk factors, but negative testing (which in some cases doesn't need to be anything other than a chest X-ray) and a reassuring clinical picture. 3. Older age, with or without risk factors, negative testing (which in some cases doesn't need to be anything other than a chest X-ray), and a reassuring clinical picture. The risk estimate does not mean that the risk is zero. It means that the risk of decompensation is lower than the risk of being in the hospital at this time. This risk estimate, based on the most current data on COVID-19, is concordant with my clinical judgement that the patient has a reassuring clinical appearance. SDM  I explained the risks of admission versus discharge in the setting of the COVID-19 pandemic. At this time, and based on clinical experience and the latest data, I estimate that the risk of going home is lower than the risk of decompensation in less than 24 hours.  I have discussed with the patient, who understands, agrees, and promises to follow-up in 12-24 hours for a recheck. The patient understands the importance of follow-up. At this point I do not feel the patient requires further work up and it is reasonable to discharge the patient. Please refer to AVS for further details regarding dischargeinstructions. A discussion was had with the patient regarding diagnosis, diagnostic testing results,treatment/ plan of care, and follow up. All questions were answered. Patient will follow up as directed for further evaluation/treatment. The patient was given strict return precautions as we discussed symptoms that wouldnecessitate return to the ED. Patient will return to ED for new/worsening symptoms. The patient verbalized their understanding and agreement with the above plan. I estimate there is LOW risk for PULMONARY EMBOLISM, ACUTE CORONARY SYNDROME, OR THORACIC AORTIC DISSECTION, thus I consider the discharge disposition reasonable. Pansy Romberg and I have discussed the diagnosisand risks, and we agree with discharging home to follow-up with their primary doctor. We also discussed returning to the Emergency Department immediately if new or worsening symptoms occur. We have discussed the symptomswhich are most concerning (e.g., bloody sputum, fever, worsening pain or shortness of breath, vomiting) that necessitate immediate return. was sent home with a prescription for below medication/s. I did Ute patient on appropriate use of these medication. New Prescriptions    GUAIFENESIN-CODEINE (TUSSI-ORGANIDIN NR) 100-10 MG/5ML SYRUP    Take 5 mLs by mouth 2 times daily as needed for Cough for up to 18 days. ONDANSETRON (ZOFRAN ODT) 4 MG DISINTEGRATING TABLET    Take 1 tablet by mouth every 8 hours as needed for Nausea         CLINICAL IMPRESSION    1. Suspected 2019 novel coronavirus infection    2. Shortness of breath    3. Cough    4. Fever, unspecified fever cause    5.  Chest pressure    6. Non-intractable vomiting with nausea, unspecified vomiting type           Discharge Vitals:  Blood pressure 101/89, pulse 58, temperature 99.2 °F (37.3 °C), temperature source Oral, resp. rate 20, weight 215 lb (97.5 kg), SpO2 100 %. FOLLOW UP  MD Miesha Yost [address] [facility]  [phone]    Schedule an appointment as soon as possible for a visit in 1 week  For further evaluation    [facility]  ED  [address]  Go to   If symptoms worsen      DISPOSITION  Patient was discharged to home in good condition. Comment: Please note this report has been produced using speech recognition software and may contain errors related to that system including errorsin grammar, punctuation, and spelling, as well as words and phrases that may be inappropriate. If there are any questions or concerns please feel free to contact the dictating provider for clarification.        KAVYA Marte - ANTHONY  01/15/21 9331

## 2021-01-15 NOTE — ED NOTES
--Patient provided with discharge instructions and any prescriptions. --Instructions, dosing, and follow-up appointments reviewed with patient/family. No further questions or needs at this time. --Vital signs and patient stable upon discharge. --Patient ambulatory to [facility].        Paty Raymond RN  01/15/21 0566

## 2021-01-16 ENCOUNTER — CARE COORDINATION (OUTPATIENT)
Dept: CARE COORDINATION | Age: 56
End: 2021-01-16

## 2021-01-16 NOTE — CARE COORDINATION
Chart reviewed. Pt seen and evaluated in ED for Suspected COVID. Pt given the following referrals/recommendations (see below):    - Monitor Pulse Ox    - Schedule an appointment with Ramya Fry MD, MD (Internal Medicine) in 1 week (1/22/2021); For further evaluation    Medication Changes       guaiFENesin-Codeine 100-10 MG/5ML 5 mLs Oral 2 TIMES DAILY PRN     Ondansetron 4 mg Oral EVERY 8 HOURS PRN    Initial ED f/u call to pt (see below for COVID results) attempted. Unable   To reach pt. Message left requesting return call. If no return call, will attempt to reach pt later today.       SARS-CoV-2, PCR DETECTEDAbnormal        FU appts/Provider:    ([facility]):    04/15/2021 Office Visit Internal Medicine Alicja Jc MD    [address] [facility], [address]   [phone] (Fax)

## 2021-01-16 NOTE — CARE COORDINATION
Pt retirned call and left message requesting return call. ED f/u call to pt-       Patient contacted regarding COVID-19 diagnosis\". Discussed COVID-19 related testing which was available at this time. Test results were positive. Patient informed of results, if available? Yes    Care Transition Nurse/ Ambulatory Care Manager contacted the patient by telephone to perform post discharge assessment. Call within 2 business days of discharge: Yes. Verified name and  with patient as identifiers. Provided introduction to self, and explanation of the CTN/ACM role, and reason for call due to risk factors for infection and/or exposure to COVID-19. Symptoms reviewed with patient who verbalized the following symptoms: cough, vomiting, no new symptoms and no worsening symptoms. Due to no new or worsening symptoms encounter was not routed to provider for escalation. Discussed follow-up appointments. If no appointment was previously scheduled, appointment scheduling offered: [place] [facility] [place]  [facility] follow up appointment(s): No future appointments. [facility] follow up appointment(s): Pt to f/u with PCP    Pt stated he is using his Pulse ox and read over the instructions. Pt aware to use BID and prn. Pt stated he checked his Pulse ox at 6am today and it was 96%. Non-face-to-face services provided:  Obtained and reviewed discharge summary and/or continuity of care documents     Advance Care Planning:   Does patient have an Advance Directive:  reviewed and current. Patient has following risk factors of: diabetes and recent COVID dx and recent ED visit. CTN/ACM reviewed discharge instructions, medical action plan and red flags such as increased shortness of breath, increasing fever and signs of decompensation with patient who verbalized understanding. Discussed exposure protocols and quarantine with CDC Guidelines What to do if you are sick with coronavirus disease 2019.  Patient was given an opportunity for questions and concerns. The patient agrees to contact the [employer] exposure line [phone], local health department [place] Department of Health: ([phone]) and PCP office for questions related to their healthcare. CTN/ACM provided contact information for future needs. Reviewed and educated patient on any new and changed medications related to discharge diagnosis     Patient/family/caregiver given information for [employer] and agrees to enroll no  Patient's preferred e-mail: [email or web address]     Plan for follow-up call in 1-2 days based on severity of symptoms and risk factors. Pt agreeable to have COVID education and COVID vaccine info sent to him via [facility].  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

## 2021-01-18 ENCOUNTER — CARE COORDINATION (OUTPATIENT)
Dept: CARE COORDINATION | Age: 56
End: 2021-01-18

## 2021-01-18 NOTE — CARE COORDINATION
ED f/u call to pt    Patient contacted regarding COVID-19 risk and screening. Discussed COVID-19 related testing which was available at this time. Test results were positive. Patient informed of results, if available? Previously discussed. Care Transition Nurse/ Ambulatory Care Manager contacted the patient by telephone to perform follow-up assessment. Verified name and  with patient as identifiers. Patient has following risk factors of: diabetes and recent COVID dx and recent ED visit. Pulse Ox 96% HR 70  No Fever since Friday! Symptoms are improving and overall per pt he is feeling better. Symptoms reviewed with patient who verbalized the following symptoms: cough, no new symptoms, no worsening symptoms and intermittent coughing. Overall \"I am feeling better\". .      Due to no new or worsening symptoms encounter was not routed to provider for escalation. Education provided regarding infection prevention, and signs and symptoms of COVID-19 and when to seek medical attention with patient who verbalized understanding. Discussed exposure protocols and quarantine from [address] you at higher risk for severe illness  and given an opportunity for questions and concerns. The patient agrees to contact the COVID-19 hotline [phone] or PCP office for questions related to their healthcare. CTN/ACM provided contact information for future reference. From CDC: Are you at higher risk for severe illness?  Wash your hands often.  Avoid close contact (6 feet, which is about two arm lengths) with people who are sick.  Put distance between yourself and other people if COVID-19 is spreading in your community.  Clean and disinfect frequently touched surfaces.  Avoid all cruise travel and non-essential air travel.  Call your healthcare professional if you have concerns about COVID-19 and your underlying condition or if you are sick.     For more information on steps you can take to protect yourself, see CDC's How to [place] for follow-up call in 3-5 days based on severity of symptoms and risk factors.     FU appts/Provider:    ([facility]):    04/15/2021 Office Visit Internal Medicine Carmenza Sheets MD    [address] [facility], [address]   [phone] (Fax)

## 2021-01-21 ENCOUNTER — CARE COORDINATION (OUTPATIENT)
Dept: CARE COORDINATION | Age: 56
End: 2021-01-21

## 2021-07-12 ENCOUNTER — HOSPITAL ENCOUNTER (OUTPATIENT)
Dept: NEUROLOGY | Age: 56
Discharge: HOME | End: 2021-07-12
Payer: COMMERCIAL

## 2021-07-12 DIAGNOSIS — E13.42 DIABETIC POLYNEUROPATHY ASSOCIATED WITH OTHER SPECIFIED DIABETES MELLITUS (HCC): ICD-10-CM

## 2021-07-12 PROCEDURE — 95910 NRV CNDJ TEST 7-8 STUDIES: CPT | Performed by: PHYSICAL MEDICINE & REHABILITATION

## 2021-07-12 PROCEDURE — 95885 MUSC TST DONE W/NERV TST LIM: CPT | Performed by: PHYSICAL MEDICINE & REHABILITATION

## 2021-08-20 NOTE — PROGRESS NOTES
Venessa Larson MD  [facility]) Physicians - Rheumatology    [x] [facility] HOSPITAL:  [address] [facility] [] [place] Office:  [address], [address]   Office: [phone]  Fax: [phone]     RHEUMATOLOGY CONSULT NOTE    HISTORY OF PRESENT ILLNESS:    The pt is a 64 y.o. male referred by Pooja Nava DPM for arthritis of L ankle, feet pain. Current rheum meds:  Elavil 25 mg QHS    Pt reports chronic pain in his feet. Pain radiates from his feet up to his knees. Pain is exacerbated by walking. L knee hurt. He reports generalized swelling in his ankles and feet at the end of the day, denies toe swelling. He reports paresthesias in his toes and occasionally in his fingers. He tells me he's s/p b/l 5th toe amputations for osteomyelitis caused by chronic non-healing diabetic ulcers. He currently has a chronic ulcer underneath his R big toe. He tells me he was referred by his podiatrist for \"bone spurs\" in his feet. Denies Hx of gout attacks or Hx of nephrolithiasis. Denies joint pain, joint swelling or prolonged morning stiffness in his hands, wrists or any other joints. Denies chronic back pain. Pt states he recently started on Elavil for diabetic neuropathy a few days ago. Pt tells me his diabetes was previously poorly controlled (per pt HgbA1c was as high as 13) causing nonhealing diabetic ulcers. He tells me his diabetes is now well controlled, he is not requiring insulin. Denies photosensitivity, rash, sicca, oral or nasal ulcers. Denies Sx of Raynaud's phenomenon. Denies Hx of ischem    Denies known FHx of autoimmune disease. He is a never smoker. He is on disability.      REVIEW OF SYSTEMS:    Constitutional: denies chronic fatigue, fever/chills, night sweats, unintentional weight loss  Integumentary: denies photosensitivity, rash, patchy alopecia, or Sx of Raynaud's phenomenon  Eyes: denies dry eyes, redness or pain, visual disturbance, or Occupational History    Not on file   Tobacco Use    Smoking status: Never Smoker    Smokeless tobacco: Never Used   Vaping Use    Vaping Use: Never used   Substance and Sexual Activity    Alcohol use: No    Drug use: No    Sexual activity: Not Currently   Other Topics Concern    Not on file   Social History Narrative    Not on file     Social Determinants of Health     Financial Resource Strain:     Difficulty of Paying Living Expenses:    Food Insecurity:     Worried About Running Out of Food in the Last Year:     Ran Out of Food in the Last Year:    Transportation Needs:     Lack of Transportation (Medical):  Lack of Transportation (Non-Medical):    Physical Activity:     Days of Exercise per Week:     Minutes of Exercise per Session:    Stress:     Feeling of Stress :    Social Connections:     Frequency of Communication with Friends and Family:     Frequency of Social Gatherings with Friends and Family:     Attends Buddhism Services:     Active Member of Clubs or Organizations:     Attends Club or Organization Meetings:     Marital Status:    Intimate Partner Violence:     Fear of Current or Ex-Partner:     Emotionally Abused:     Physically Abused:     Sexually Abused:         No family history on file.     MEDICATIONS:    Current Outpatient Medications:     amitriptyline (ELAVIL) 25 MG tablet, Take 25 mg by mouth nightly, Disp: , Rfl:     atorvastatin (LIPITOR) 40 MG tablet, Take 40 mg by mouth daily, Disp: , Rfl:     enalapril (VASOTEC) 5 MG tablet, , Disp: , Rfl:     glipiZIDE (GLUCOTROL) 10 MG tablet, Take 20 mg by mouth 2 times daily, Disp: , Rfl:     ibuprofen (ADVIL;MOTRIN) 400 MG tablet, , Disp: , Rfl:     naproxen (NAPROSYN) 500 MG tablet, Take 1 tablet by mouth 2 times daily as needed for Pain, Disp: 60 tablet, Rfl: 1    ALLERGIES:  Metformin and related    PHYSICAL EXAM:    Vitals:    /62   Ht 5' 10\" (1.778 m)   Wt 262 lb (118.8 kg)   BMI 37.59 kg/m² from the prior study. Osteomyelitis primarily considered particularly given the history. Other findings above. MRI L foot (9/3/20): Diffuse soft tissue edema which is most prominent dorsally. Soft tissue ulceration along the plantar lateral aspect of the distal foot. There is subtle mild increased T2 signal noted within the 5th metatarsal head and the 5th digit. No corresponding low T1 signal changes. Findings may be reactive however concern for early osteomyelitis given adjacent ulceration. Above results were discussed w/ the pt in detail during today's visit. ASSESSMENT/PLAN:  64 y.o. male w/ polyarthralgias and paresthesias. PMHx pertinent for HTN, T2DM w/ Hx of non-healing lower extremity ulcers, MRSA infections and osteomyelitis s/p b/l 5th toe amputations and HLD. 1. Polyarthralgia  - chronic, non-inflammatory arthralgias predominantly in the feet, ankles and L knee. No evidence of synovitis or joint effusion appreciated on exam today. Suspect he has degenerative arthritis accelerated by prior Hx of osteomyelitis from poorly controlled diabetes. ROS otherwise negative for CTD or vasculitis. Obtain rheum w/u as below, obtain radiographs to evaluate for extent of degenerative arthritis/diabetic [place] foot  - he will try Naproxen 500 mg BID PRN. Offered to inject L knee if pain does not respond to NSAID at his f/u visit  - Cyclic Citrul Peptide Antibody, IgG; Future  - Rheumatoid Factor; Future  - Uric Acid; Future  - XR FOOT RIGHT (MIN 3 VIEWS); Future  - XR FOOT LEFT (MIN 3 VIEWS); Future  - XR ANKLE LEFT (2 VIEWS); Future  - XR ANKLE RIGHT (2 VIEWS); Future  - XR KNEE LEFT (3 VIEWS); Future  - XR KNEE RIGHT (3 VIEWS); Future  - Hepatitis B Core Antibody, Total; Future  - Hepatitis C Antibody; Future  - Hepatitis B Surface Antigen; Future  - Anti-Neutrophilic Cytoplasmic Antibody; Future  - MPO/UT-3(ANCA) ABS; Future  - ESTEPHANIA Reflex to Antibody Cascade; Future  - C3 Complement;  Future  - C4 Complement; Future  - Vitamin D 25 Hydroxy; Future  - naproxen (NAPROSYN) 500 MG tablet; Take 1 tablet by mouth 2 times daily as needed for Pain  Dispense: 60 tablet; Refill: 1    2. Paresthesia  - per pt EMG proven diabetic neuropathy although I do not have access to the study results, ROS negative for vasculitis such as PAN or CTD  - recently started on Amitriptyline by PCP, discussed trying low dose Gabapentin at his f/u visit  - Hepatitis B Core Antibody, Total; Future  - Hepatitis C Antibody; Future  - Hepatitis B Surface Antigen; Future  - Cryoglobulin; Future  - Anti-Neutrophilic Cytoplasmic Antibody; Future  - MPO/[place]-3(ANCA) ABS; Future  - ESTEPHANIA Reflex to Antibody Cascade; Future  - C3 Complement; Future  - C4 Complement; Future    Return in about 4 weeks (around 9/21/2021) for lab result discussion and treatment plan, medication monitoring. 8/24/2021 1:43 PM      Sending consult note to referring provider Ginger Hong DPM.    Thank you very much for allowing me to participate in this pt's care. Please do not hesitate to contact me if I can be of further assistance. NOTE: This report is transcribed by using voice recognition software dragon. Every effort is made to ensure accuracy; however, inadvertent computerized transcription errors may be present.

## 2021-08-24 ENCOUNTER — HOSPITAL ENCOUNTER (OUTPATIENT)
Dept: GENERAL RADIOLOGY | Age: 56
Discharge: HOME | End: 2021-08-24
Payer: COMMERCIAL

## 2021-08-24 ENCOUNTER — OFFICE VISIT (OUTPATIENT)
Dept: RHEUMATOLOGY | Age: 56
End: 2021-08-24
Payer: COMMERCIAL

## 2021-08-24 ENCOUNTER — HOSPITAL ENCOUNTER (OUTPATIENT)
Age: 56
Discharge: HOME | End: 2021-08-24
Payer: COMMERCIAL

## 2021-08-24 VITALS
DIASTOLIC BLOOD PRESSURE: 62 MMHG | SYSTOLIC BLOOD PRESSURE: 124 MMHG | WEIGHT: 262 LBS | HEIGHT: 70 IN | BODY MASS INDEX: 37.51 KG/M2

## 2021-08-24 DIAGNOSIS — M25.50 POLYARTHRALGIA: Primary | ICD-10-CM

## 2021-08-24 DIAGNOSIS — M25.50 POLYARTHRALGIA: ICD-10-CM

## 2021-08-24 DIAGNOSIS — R20.2 PARESTHESIA: ICD-10-CM

## 2021-08-24 PROCEDURE — 73630 X-RAY EXAM OF FOOT: CPT

## 2021-08-24 PROCEDURE — 73562 X-RAY EXAM OF KNEE 3: CPT

## 2021-08-24 PROCEDURE — G8427 DOCREV CUR MEDS BY ELIG CLIN: HCPCS | Performed by: INTERNAL MEDICINE

## 2021-08-24 PROCEDURE — 73600 X-RAY EXAM OF ANKLE: CPT

## 2021-08-24 PROCEDURE — 99244 OFF/OP CNSLTJ NEW/EST MOD 40: CPT | Performed by: INTERNAL MEDICINE

## 2021-08-24 PROCEDURE — G8417 CALC BMI ABV UP PARAM F/U: HCPCS | Performed by: INTERNAL MEDICINE

## 2021-08-24 RX ORDER — IBUPROFEN 400 MG/1
TABLET ORAL
COMMUNITY
Start: 2021-08-02

## 2021-08-24 RX ORDER — ENALAPRIL MALEATE 5 MG/1
TABLET ORAL
COMMUNITY
Start: 2021-08-19

## 2021-08-24 RX ORDER — AMITRIPTYLINE HYDROCHLORIDE 25 MG/1
25 TABLET, FILM COATED ORAL NIGHTLY
COMMUNITY
Start: 2021-08-19

## 2021-08-24 RX ORDER — NAPROXEN 500 MG/1
500 TABLET ORAL 2 TIMES DAILY PRN
Qty: 60 TABLET | Refills: 1 | Status: SHIPPED | OUTPATIENT
Start: 2021-08-24 | End: 2021-09-29

## 2021-08-24 RX ORDER — GLIPIZIDE 10 MG/1
20 TABLET ORAL 2 TIMES DAILY
COMMUNITY
Start: 2021-08-19

## 2021-08-24 RX ORDER — ATORVASTATIN CALCIUM 40 MG/1
40 TABLET, FILM COATED ORAL DAILY
COMMUNITY
Start: 2021-08-19

## 2021-08-24 NOTE — PATIENT INSTRUCTIONS
to:    Knowledge is power. There is a wealth of free patient information available on [email or web address]. Some articles require a subscription, so if you are unable to access an article, please let Dr. Rosana Ortez know and she can print it or e-mail the article to you. We encourage you to read as much as you can about your diagnosis and the recommended medications.

## 2021-09-24 NOTE — PROGRESS NOTES
Fred Shirley MD  [facility]) Physicians - Rheumatology    [x] [facility] HOSPITAL:  [address], [address] [facility] [] [place] Office:  [address], [address]   Office: [phone]  Fax: [phone]     RHEUMATOLOGY PROGRESS NOTE    SUBJECTIVE:    Background:   Serenity Elkins is a 64 y.o. male w/ generalized OA and diabetic neuropathy. PMHx pertinent for HTN, T2DM w/ Hx of non-healing lower extremity ulcers, MRSA infections and osteomyelitis s/p b/l 5th toe amputations and HLD. Current rheum meds:  Naproxen 500 mg BID PRN  Elavil 25 mg QHS    Past medical/surgical history, medications and allergies are reviewed and updated as appropriate. Interval Hx:   Pt reports chronic arthralgias mainly in his feet. Worst pain is in his L mid foot. Pain is exacerbated by walking. He reports chronic pain is in his L knee. Naproxen is ineffective. Denies history of enthesitis, specifically he denies prior Hx of Achilles tendinopathy or plantar fasciitis. Denies any arthralgias, joint swelling or morning stiffness in his hand or wrist joints. Denies inflammatory back pain. He reports chronic yellowish discoloration in his nails, denies Hx of PsO. Denies Hx of uveitis or IBD. He reports chronic paresthesias in his feet.      Rheumatologic ROS:  Constitutional: denies chronic fatigue, fever/chills, night sweats, unintentional weight loss  Integumentary: denies photosensitivity, rash, patchy alopecia, or Sx of Raynaud's phenomenon  Eyes: denies dry eyes, redness or pain, visual disturbance, or floaters  Ears: denies hearing loss, tinnitus, vertigo, or recurrent ear infections  Nose: denies nasal ulcers or recurrent sinusitis  Oral cavity: denies dry mouth or oral ulcers  Cardiovascular: denies CP, palpitations, Hx of pericardial effusion or pericarditis  Respiratory: denies SOB, cough, hemoptysis, or pleurisy  Gastrointestinal: denies heart burn, dysphagia or esophageal dysmotility, denies change in bowel habits or Sx of IBD  Hematologic/Lymphatic: denies abnormal bruising or bleeding, denies Hx of blood clots, denies swollen LNs  Musculoskeletal:  refer to above HPI   Neurological: denies focal weakness, paresthesias/hyperesthesias or change in sensation, denies Hx of seizure, denies change in gait, balance, or memory  Psychiatric: denies Hx of depression or anxiety    Allergies   Allergen Reactions    Metformin And Related Diarrhea       Past Medical History:        Diagnosis Date    Diabetes mellitus ([facility]     Foot ulcer ([facility]     Right foot/diabetic    MRSA (methicillin resistant staph aureus) culture positive 09/03/2020    foot    MRSA (methicillin resistant staph aureus) culture positive 12/04/2020    nasal bridge       Past Surgical History:        Procedure Laterality Date    ANKLE SURGERY      FOOT DEBRIDEMENT Right 7/11/2019    RIGHT FOOT  INCISION AND DRAINAGE WITH 5TH METATARSAL BONE RESECTION performed by Misael Elkins DPM at [address] [facility]      right ankle    TOE AMPUTATION Right 7/8/2019    incision and drainage right foot with possible partial 5th ray amputation performed by Misael Elkins DPM at [address] [facility] Left 9/4/2020    AMPUTATION FIFTH TOE, AND FIFTH METATARSAL HEAD OF LEFT FOOT WITH INCISION AND DEBRIDEMENT ([address] WITH ANTIBIOTIC, WANTS SAGITTAL SAW) performed by Selvin Mcdonald DPM at [facility]       Medications:    Current Outpatient Medications   Medication Sig Dispense Refill    acetaminophen (TYLENOL) 500 MG tablet       Continuous Blood Gluc Sensor (FREESTYLE RUDI 14 DAY SENSOR) MISC       diclofenac (VOLTAREN) 50 MG EC tablet Take 1 tablet by mouth 3 times daily as needed for Pain 270 tablet 0    gabapentin (NEURONTIN) 300 MG capsule Take 1 capsule by mouth 3 times daily for 90 days.  Intended supply: 90 days 270 capsule 0    Cholecalciferol 1.25 MG (48260 UT) TABS Take 50,000 Units by mouth once a week For 12 weeks then take over-the-counter vitamin D3 2000 units daily. 12 tablet 0    amitriptyline (ELAVIL) 25 MG tablet Take 25 mg by mouth nightly      atorvastatin (LIPITOR) 40 MG tablet Take 40 mg by mouth daily      enalapril (VASOTEC) 5 MG tablet       glipiZIDE (GLUCOTROL) 10 MG tablet Take 20 mg by mouth 2 times daily      ibuprofen (ADVIL;MOTRIN) 400 MG tablet        No current facility-administered medications for this visit. OBJECTIVE:  Physical Exam:  /78   Pulse 60   Wt 268 lb (121.6 kg)   BMI 38.45 kg/m²     GEN: AAOx3, in NAD, well-appearing  HEAD: normocephalic, atraumatic  EYES: no injection or icterus  CVS: RRR, dorsalis pedis palpable b/l, brisk capillary refill in toes  LUNGS: in no acute respiratory distress, CTAB  MSK:  Spine: no kyphosis or lordosis, no paraspinal muscle or vertebral tenderness, SI joints NTTP  Upper extremities:              Hands: no synovitis or dactylitis, joints NTTP, full fist formation w/ good  strength              Wrist: no synovitis in the wrist joints b/l NTTP, FROM              Elbow: no synovitis or bursitis, FROM  Lower extremities:              Knees: there is mild bony enlargement of the knee joints, R knee medial joint line slightly TTP, FROM              Ankles: trace soft tissue swelling around the ankle joints diffusely TTP, FROM, Achilles tendons w/o swelling or warmth NTTP              Feet: s/p b/l 5th toe amputation, bottom of R foot wrapped in bandage no visible drainage, no toe swelling or pain or warmth on palpation w/ FROM, negative MTP squeeze test  INTEGUMENT: fingernails w/ yellowish discoloration and nail dystrophy, no rash or psoriatic lesions, no petechiae, bruises, or palpable purpura, no patchy alopecia, no nail or periungual changes, no clubbing or digital ulcers    DATA:  Labs:   I personally reviewed interval labs and discussed w/ the pt in detail which showed:    CBC (8/19/21): WBC 11.1, H/H 12.2/38.7  CMP (8/19/21): SCr Heterotopic ossification along the plantar fascia and lateral base of the foot presumably related to postsurgical or posttraumatic changes. Extensive enthesopathy at the plantar and Achilles insertion. Right foot:   Midfoot osteoarthritis with joint space narrowing and osteophyte formation. Previous amputation of the fifth digit to the level of the proximal fifth metatarsal. Lateral heterotopic ossification along the peroneus brevis insertion. IMPRESSION:  1. Advanced changes of osteoarthritis at the tibiotalar, subtalar, and midfoot regions. No acute osseous abnormality. 2. Extensive calcaneal enthesopathy. 3. Heterotopic ossification along the previous fifth metatarsal amputation region. L foot:  FINDINGS:   Mineralization appears intact. Previous amputation of the fifth metatarsal at the mid metatarsal level. Heterotopic ossification along the lateral aspect and plantar aspect of the foot. Mild first MTP osteoarthritis. Mild midfoot osteophyte formation. No fracture. No subluxation. No lysis or periosteal reaction to suggest osteomyelitis. IMPRESSION:  1. Mild midfoot, first MCP and tibiotalar osteoarthritis. 2. Heterotopic ossification along the lateral plantar aspect of the foot presumably related to remote trauma or remote postsurgical changes. 3. Calcaneal enthesopathy. Left knee demonstrates normal mineralization. No fracture or dislocation. Mild medial joint space narrowing. Patellofemoral advanced osteoarthritis with joint space narrowing and osteophyte formation. Patellar enthesopathy. Small joint effusion on the lateral projection. Right knee:   Mild patellofemoral osteophyte arthritis with osteophyte formation. Patellar enthesopathy. No fracture or dislocation. No significant joint effusion identified. No pathologic soft tissue calcification. IMPRESSION:  1. Bilateral osteoarthritis of the knees most pronounced in the patellofemoral joint spaces.    2. Patellar discussed with the patient today. Questions were answered. NOTE: This report is transcribed by using voice recognition software dragon. Every effort is made to ensure accuracy; however, inadvertent computerized  transcription errors may be present.

## 2021-09-29 ENCOUNTER — OFFICE VISIT (OUTPATIENT)
Dept: RHEUMATOLOGY | Age: 56
End: 2021-09-29
Payer: COMMERCIAL

## 2021-09-29 VITALS
BODY MASS INDEX: 38.45 KG/M2 | SYSTOLIC BLOOD PRESSURE: 130 MMHG | HEART RATE: 60 BPM | WEIGHT: 268 LBS | DIASTOLIC BLOOD PRESSURE: 78 MMHG

## 2021-09-29 DIAGNOSIS — Z51.81 ENCOUNTER FOR THERAPEUTIC DRUG MONITORING: ICD-10-CM

## 2021-09-29 DIAGNOSIS — M15.9 GENERALIZED OSTEOARTHRITIS OF MULTIPLE SITES: Primary | ICD-10-CM

## 2021-09-29 DIAGNOSIS — E11.42 DIABETIC POLYNEUROPATHY ASSOCIATED WITH TYPE 2 DIABETES MELLITUS (HCC): ICD-10-CM

## 2021-09-29 DIAGNOSIS — L60.3 NAIL DYSTROPHY: ICD-10-CM

## 2021-09-29 DIAGNOSIS — E55.9 VITAMIN D DEFICIENCY: ICD-10-CM

## 2021-09-29 PROCEDURE — G8417 CALC BMI ABV UP PARAM F/U: HCPCS | Performed by: INTERNAL MEDICINE

## 2021-09-29 PROCEDURE — G8427 DOCREV CUR MEDS BY ELIG CLIN: HCPCS | Performed by: INTERNAL MEDICINE

## 2021-09-29 PROCEDURE — 3046F HEMOGLOBIN A1C LEVEL >9.0%: CPT | Performed by: INTERNAL MEDICINE

## 2021-09-29 PROCEDURE — 2022F DILAT RTA XM EVC RTNOPTHY: CPT | Performed by: INTERNAL MEDICINE

## 2021-09-29 PROCEDURE — 3017F COLORECTAL CA SCREEN DOC REV: CPT | Performed by: INTERNAL MEDICINE

## 2021-09-29 PROCEDURE — 1036F TOBACCO NON-USER: CPT | Performed by: INTERNAL MEDICINE

## 2021-09-29 PROCEDURE — 99214 OFFICE O/P EST MOD 30 MIN: CPT | Performed by: INTERNAL MEDICINE

## 2021-09-29 RX ORDER — FLASH GLUCOSE SENSOR
KIT MISCELLANEOUS
COMMUNITY
Start: 2021-08-30

## 2021-09-29 RX ORDER — GABAPENTIN 300 MG/1
300 CAPSULE ORAL 3 TIMES DAILY
Qty: 270 CAPSULE | Refills: 0 | Status: SHIPPED | OUTPATIENT
Start: 2021-09-29 | End: 2021-12-29

## 2021-09-29 RX ORDER — ATORVASTATIN CALCIUM 20 MG/1
TABLET, FILM COATED ORAL
COMMUNITY
Start: 2021-07-27 | End: 2021-09-29

## 2021-09-29 RX ORDER — ACETAMINOPHEN 500 MG
TABLET ORAL
COMMUNITY
Start: 2021-08-02

## 2021-12-24 NOTE — PROGRESS NOTES
Kamla Lion MD  [facility]) Physicians - Rheumatology    [x] [facility]:  [facility]  [address] [facility] [] [address]:  [address], [address]   Office: [phone]  Fax: [phone]     RHEUMATOLOGY PROGRESS NOTE    SUBJECTIVE:    Background:   Alphonso Bishop is a 64 y.o. male w/ generalized OA and diabetic neuropathy. PMHx pertinent for HTN, T2DM w/ Hx of non-healing lower extremity ulcers, MRSA infections and osteomyelitis s/p b/l 5th toe amputations and HLD. Current rheum meds:  Diclofenac 50 mg TID PRN  Elavil 25 mg QHS  Cholecalciferol 50,000 wkly    Prior rheum meds:  Naproxen 500 mg BID PRN    Past medical/surgical history, medications and allergies are reviewed and updated as appropriate. Interval Hx:   Pt reports chronic arthralgias in his feet including his L mid foot and L knee. Pain is exacerbated by walking. He reports chronic paresthesias in his feet. He reports good pain relief from oral Diclofenac which he tells me he's been taking TID. He feels this is more effective than Naproxen. He tells me he never started Gabapentin as he was reportedly told by his PCP to avoid Gabapentin because \"it can depress my respiratory system\".     Rheumatologic ROS:  Constitutional: denies chronic fatigue, fever/chills, night sweats, unintentional weight loss  Integumentary: denies photosensitivity, rash, patchy alopecia, or Sx of Raynaud's phenomenon  Eyes: denies dry eyes, redness or pain, visual disturbance, or floaters  Nose: denies nasal ulcers or recurrent sinusitis  Oral cavity: denies dry mouth or oral ulcers  Cardiovascular: denies CP, palpitations, Hx of pericardial effusion or pericarditis  Respiratory: denies SOB, cough, hemoptysis, or pleurisy  Gastrointestinal: denies heart burn, dysphagia or esophageal dysmotility, denies change in bowel habits or Sx of IBD  Hematologic/Lymphatic: denies abnormal bruising or bleeding, denies Hx of blood clots, denies swollen LNs  Musculoskeletal:  refer to above HPI     Allergies   Allergen Reactions    Metformin And Related Diarrhea       Past Medical History:        Diagnosis Date    [facility] mellitus ([facility] [address]     Foot ulcer ([facility] [address]     Right foot/diabetic    MRSA (methicillin resistant staph aureus) culture positive 09/03/2020    foot    MRSA (methicillin resistant staph aureus) culture positive 12/04/2020    nasal bridge       Past Surgical History:        Procedure Laterality Date    ANKLE SURGERY      FOOT DEBRIDEMENT Right 7/11/2019    RIGHT FOOT  INCISION AND DRAINAGE WITH 5TH METATARSAL BONE RESECTION performed by Marnie Lagos DPM at [address] [facility]      right ankle    TOE AMPUTATION Right 7/8/2019    incision and drainage right foot with possible partial 5th ray amputation performed by Marnie Lagos DPM at [address] [facility] [address] Left 9/4/2020    AMPUTATION FIFTH TOE, AND FIFTH METATARSAL HEAD OF LEFT FOOT WITH INCISION AND DEBRIDEMENT (PULSEVAC WITH ANTIBIOTIC, WANTS SAGITTAL SAW) performed by Evangelist Dubon DPM at [facility]       Medications:    Current Outpatient Medications   Medication Sig Dispense Refill    diclofenac (VOLTAREN) 50 MG EC tablet Take 1 tablet by mouth 3 times daily as needed for Pain 270 tablet 0    Cholecalciferol 1.25 MG (58579 UT) TABS Take 50,000 Units by mouth once a week For 12 weeks then take over-the-counter vitamin D3 2000 units daily.  12 tablet 0    acetaminophen (TYLENOL) 500 MG tablet       Continuous Blood Gluc Sensor (FREESTYLE RUDI 14 DAY SENSOR) MISC       amitriptyline (ELAVIL) 25 MG tablet Take 25 mg by mouth nightly      atorvastatin (LIPITOR) 40 MG tablet Take 40 mg by mouth daily      enalapril (VASOTEC) 5 MG tablet       glipiZIDE (GLUCOTROL) 10 MG tablet Take 20 mg by mouth 2 times daily      ibuprofen (ADVIL;MOTRIN) 400 MG tablet  (Patient not taking: Reported on 12/29/2021)       No current facility-administered medications for this visit. OBJECTIVE:  Physical Exam:  /82   Pulse 67   Ht 5' 9\" (1.753 m)   Wt 274 lb (124.3 kg)   SpO2 99%   BMI 40.46 kg/m²     GEN: AAOx3, in NAD, well-appearing  HEAD: normocephalic, atraumatic  EYES: no injection or icterus  CVS: RRR, dorsalis pedis palpable b/l, brisk capillary refill in toes  LUNGS: in no acute respiratory distress, CTAB  MSK:  Spine: no kyphosis or lordosis, no paraspinal muscle or vertebral tenderness, SI joints NTTP  Upper extremities:              Hands: no synovitis or dactylitis, joints NTTP, full fist formation w/ good  strength              Wrist: no synovitis in the wrist joints b/l NTTP, FROM              Elbow: no synovitis or bursitis, FROM  Lower extremities:              Knees: there is mild bony enlargement of the knee joints, R knee medial joint line slightly TTP, FROM              Ankles: trace soft tissue swelling around the ankle joints diffusely TTP, FROM, Achilles tendons w/o swelling or warmth NTTP              Feet: s/p b/l 5th toe amputation, bottom of R foot wrapped in bandage no visible drainage, no toe swelling or pain or warmth on palpation w/ FROM, negative MTP squeeze test  INTEGUMENT: fingernails w/ yellowish discoloration and nail dystrophy, no rash or psoriatic lesions, no petechiae, bruises, or palpable purpura, no patchy alopecia, no nail or periungual changes, no clubbing or digital ulcers    DATA:  Labs:   I personally reviewed interval labs and discussed w/ the pt in detail which showed:    CBC (8/19/21): WBC 11.1, H/H 12.2/38.7  CMP (8/19/21): SCr 1.16, GFR 70, LFTs wnl    Lab Results   Component Value Date    VITD25 20.7 (L) 08/24/2021     Lab Results   Component Value Date    C3 160.5 08/24/2021     Lab Results   Component Value Date    C4 25.2 08/24/2021     No results found for: ANTIDSDNAIGG     Lab Results   Component Value Date    LABURIC 7.8 (H) 08/24/2021     Lab Results   Component Value Date    CRP 9.3 (H) 10/01/2020    CRP 8.2 (H) 09/24/2020    CRP 9.7 (H) 09/17/2020    CRP 9.3 (H) 12/18/2019     Lab Results   Component Value Date    SEDRATE 50 (H) 09/02/2020    SEDRATE 25 (H) 12/18/2019    SEDRATE 48 (H) 11/24/2019    SEDRATE 34 (H) 08/19/2019     Negative RF, CCP (8/24/21)  Negative ESTEPHANIA (8/24/21)  Negative ANCA by IFA, negative MPO and PR3 Ab (8/24/21)  Negative cryoglobulin at 72 hr (8/24/21)  Negative hepatitis B and C serologies  HgbA1c 6.9 (4/15/21)    Imaging:  I personally reviewed interval imaging and discussed w/ the pt in detail which included:    X-ray L foot (9/2/20): Suspected bone destruction such as osteomyelitis involving the base of the 1st distal phalanx and lesser degree medial aspect of the adjacent proximal phalanx across the joint space.  This is new from the prior study. Osteomyelitis primarily considered particularly given the history.   Other findings above.      MRI L foot (9/3/20): Diffuse soft tissue edema which is most prominent dorsally.  Soft tissue ulceration along the plantar lateral aspect of the distal foot.  There is subtle mild increased T2 signal noted within the 5th metatarsal head and the 5th digit.  No corresponding low T1 signal changes.  Findings may be reactive however concern for early osteomyelitis given adjacent ulceration. X-rays (8/24/21): Right ankle:     Advanced tibiotalar osteophyte arthritis with joint space narrowing and osteophyte formation. No fracture. No subluxation. Talonavicular osteoarthritis with osteophyte formation. Large osteophyte formation involving the subtalar joint. Heterotopic ossification along the plantar fascia and lateral base of the foot presumably related to postsurgical or posttraumatic changes. Extensive enthesopathy at the plantar and Achilles insertion. Right foot:   Midfoot osteoarthritis with joint space narrowing and osteophyte formation.  Previous amputation of the fifth digit to the level of the proximal fifth metatarsal. Lateral heterotopic ossification along the peroneus brevis insertion. IMPRESSION:  1. Advanced changes of osteoarthritis at the tibiotalar, subtalar, and midfoot regions. No acute osseous abnormality. 2. Extensive calcaneal enthesopathy. 3. Heterotopic ossification along the previous fifth metatarsal amputation region. L foot:  FINDINGS:   Mineralization appears intact. Previous amputation of the fifth metatarsal at the mid metatarsal level. Heterotopic ossification along the lateral aspect and plantar aspect of the foot. Mild first MTP osteoarthritis. Mild midfoot osteophyte formation. No fracture. No subluxation. No lysis or periosteal reaction to suggest osteomyelitis. IMPRESSION:  1. Mild midfoot, first MCP and tibiotalar osteoarthritis. 2. Heterotopic ossification along the lateral plantar aspect of the foot presumably related to remote trauma or remote postsurgical changes. 3. Calcaneal enthesopathy. Left knee demonstrates normal mineralization. No fracture or dislocation. Mild medial joint space narrowing. Patellofemoral advanced osteoarthritis with joint space narrowing and osteophyte formation. Patellar enthesopathy. Small joint effusion on the lateral projection. Right knee:   Mild patellofemoral osteophyte arthritis with osteophyte formation. Patellar enthesopathy. No fracture or dislocation. No significant joint effusion identified. No pathologic soft tissue calcification. IMPRESSION:  1. Bilateral osteoarthritis of the knees most pronounced in the patellofemoral joint spaces. 2. Patellar enthesopathy. 3. Small left knee effusion. 4. No suspicious calcification or acute osseous abnormality. Above results were discussed w/ the pt in detail during today's visit. ASSESSMENT/PLAN:   1. Generalized osteoarthritis of multiple sites  Assessment & Plan:  - negative rheum w/u for inflammatory arthritis. Discussed radiographic evidence of generalized OA. Discussed findings of enthesopathy in his lower extremities, pt denied inflammatory arthralgias/inflammatory back pain. - previously made referral to Dermatology for evaluation of nail dystrophy, need to r/o PsO vs fungal infection of nails given radiographic findings of extensive enthesopathy. - cont oral Diclofenac PRN. - he was prescribed Gabapentin at his last visit however pt stated that he was reportedly told by his PCP to avoid Gabapentin because \"it can depress my respiratory system\". Will defer further Tx to PCP. - will see him back on PRN basis. Orders:  -     diclofenac (VOLTAREN) 50 MG EC tablet; Take 1 tablet by mouth 3 times daily as needed for Pain, Disp-270 tablet, R-0Normal  2. Diabetic polyneuropathy associated with type 2 diabetes mellitus ([facility] [address]  Assessment & Plan:  - per pt EMG proven. - he was prescribed Gabapentin at his last visit however pt stated that he was reportedly told by his PCP to avoid Gabapentin because \"it can depress my respiratory system\". Will defer further Tx to PCP. 3. Vitamin D deficiency  Assessment & Plan:  - pt reported compliance w/ weekly vitamin D deficiency. Repeat level now. Provided handout on vitamin D and calcium supplement. Orders:  -     Cholecalciferol 1.25 MG (42754 UT) TABS; Take 50,000 Units by mouth once a week For 12 weeks then take over-the-counter vitamin D3 2000 units daily. , Disp-12 tablet, R-0Normal  -     Vitamin D 25 Hydroxy; Future  4. Encounter for therapeutic drug monitoring  Assessment & Plan:  - repeat renal function for chronic NSAID use. Orders:  -     Creatinine, Serum; Future    Return if symptoms worsen or fail to improve. The risks and benefits of my recommendations, as well as other treatment options, benefits and side effects were discussed with the patient today. Questions were answered. NOTE: This report is transcribed by using voice recognition software dragon.  Every effort is made to ensure accuracy; however, inadvertent computerized  transcription errors may be present.

## 2021-12-29 ENCOUNTER — OFFICE VISIT (OUTPATIENT)
Dept: RHEUMATOLOGY | Age: 56
End: 2021-12-29
Payer: COMMERCIAL

## 2021-12-29 VITALS
WEIGHT: 274 LBS | DIASTOLIC BLOOD PRESSURE: 82 MMHG | OXYGEN SATURATION: 99 % | BODY MASS INDEX: 40.58 KG/M2 | HEIGHT: 69 IN | HEART RATE: 67 BPM | SYSTOLIC BLOOD PRESSURE: 134 MMHG

## 2021-12-29 DIAGNOSIS — E11.42 DIABETIC POLYNEUROPATHY ASSOCIATED WITH TYPE 2 DIABETES MELLITUS (HCC): ICD-10-CM

## 2021-12-29 DIAGNOSIS — M15.9 GENERALIZED OSTEOARTHRITIS OF MULTIPLE SITES: Primary | ICD-10-CM

## 2021-12-29 DIAGNOSIS — Z51.81 ENCOUNTER FOR THERAPEUTIC DRUG MONITORING: ICD-10-CM

## 2021-12-29 DIAGNOSIS — E55.9 VITAMIN D DEFICIENCY: ICD-10-CM

## 2021-12-29 LAB
CREAT SERPL-MCNC: 1.2 MG/DL (ref 0.9–1.3)
GFR AFRICAN AMERICAN: >60
GFR NON-AFRICAN AMERICAN: >60
VITAMIN D 25-HYDROXY: 40.1 NG/ML

## 2021-12-29 PROCEDURE — 3017F COLORECTAL CA SCREEN DOC REV: CPT | Performed by: INTERNAL MEDICINE

## 2021-12-29 PROCEDURE — G8417 CALC BMI ABV UP PARAM F/U: HCPCS | Performed by: INTERNAL MEDICINE

## 2021-12-29 PROCEDURE — G8484 FLU IMMUNIZE NO ADMIN: HCPCS | Performed by: INTERNAL MEDICINE

## 2021-12-29 PROCEDURE — 2022F DILAT RTA XM EVC RTNOPTHY: CPT | Performed by: INTERNAL MEDICINE

## 2021-12-29 PROCEDURE — G8427 DOCREV CUR MEDS BY ELIG CLIN: HCPCS | Performed by: INTERNAL MEDICINE

## 2021-12-29 PROCEDURE — 1036F TOBACCO NON-USER: CPT | Performed by: INTERNAL MEDICINE

## 2021-12-29 PROCEDURE — 99214 OFFICE O/P EST MOD 30 MIN: CPT | Performed by: INTERNAL MEDICINE

## 2021-12-29 NOTE — ASSESSMENT & PLAN NOTE
- pt reported compliance w/ weekly vitamin D deficiency. Repeat level now. Provided handout on vitamin D and calcium supplement.

## 2021-12-29 NOTE — PATIENT INSTRUCTIONS
Continue taking weekly vitamin D replacement for 12 weeks then switch to an over-the-counter vitamin D3 2000 units daily. Calcium and Vitamin D     Why do I need calcium and vitamin D? Calcium and vitamin D are important for bone health. Nerves, muscles, and blood vessels need calcium to work. Vitamin D helps the body absorb calcium, and is needed for immune system function. There is some evidence that vitamin D helps prevent cancer and cardiovascular disease. What are sources of calcium and vitamin D? Calcium is found in foods. Dairy products are good sources. Eight ounces of yogurt (228 gram) or milk (1 cup [236 mL]), or a 1.5-oz. (43 gram) serving of cheese, can provide around 300 mg. Fortified orange juice can provide 300 mg per 8-oz. (236 mL) serving. Vitamin D is made by sun-exposed skin and is found in some foods. One of the best sources is salmon. A 3-oz. (86 gram) serving of sockeye salmon provides almost 800 IU. A 3-oz. serving of tuna canned in water provides about 150 IU. Dairy products fortified with vitamin D are good sources. Examples include a cup of fortified milk (115 to 124 IU), a cup of fortified orange juice (80 IU), or 6-ozs. (171 grams) of fortified yogurt (80 IU). Calcium and vitamin D are also available as supplements. Do I need a supplement? Are they safe? Many people are low on vitamin D. It is hard to get enough vitamin D from food, and most people don't get much sun exposure because they use sunscreens, spend long hours indoors, or live at a [address] [place] [address]. Most people need a vitamin D supplement. Ask if you should have your vitamin D level checked. People typically get 300 mg calcium from their diet daily, not including dairy. If you include two servings of high-calcium foods (e.g., dairy), you can get around 900 mg per day. Supplementation with just 300 mg of calcium daily, or adding a third high-calcium serving, will provide 1200 mg daily.  You may have heard calcium supplements are unsafe. While there has been negative press about heart attacks and prostate cancer, calcium supplements have not been proven to be unsafe. But don't go overboard with calcium supplements; get your calcium from diet when possible. However, avoid calcium supplements from coral or dolomite (a kind of limestone); they can contain heavy metals like lead. How do I choose a calcium or vitamin D supplement? Most calcium products contain calcium carbonate (e.g., Tums, Caltrate) or calcium citrate (e.g., Citracal). Both work. Calcium carbonate is cheap and provides the most calcium per dose. (Read the product label to check the calcium content \"per serving,\" as this can vary depending on the type of calcium you select.) Calcium citrate may be better for patients who don't absorb calcium as well, like older people or those on heartburn medications (e.g., omeprazole [Prilosec; Losec (Larry)], ranitidine [Zantac], others). Calcium is best absorbed if no more than 500 mg is taken at a time. Some supplements contain other ingredients (e.g., magnesium, vitamin K), but these don't work any better than those with just calcium. Vitamin D is available over-the-counter in combination with calcium or by itself. There are also high-dose vitamin D products that are prescribed if you have low vitamin D levels. It is okay to take a multivitamin or eat vitamin D-containing foods while taking prescription-strength vitamin D. Vitamin D is available as either vitamin D2 or vitamin D3. Either can be used. Look for a vitamin D supplement that is USP Verified (in [facility]), a product with a Natural Product Number [NPN]). These products meet certain quality standards. How much calcium and vitamin D do I need? Women up to 48years old and men up to age 79 need 1000 mg of calcium daily. Women over 48years old and men over 70 need 1200 mg of calcium daily.  The vitamin D RDA (recommended dietary allowance) has recently been increased to 600 IU daily for adults under age 79 and 800 IU daily for people over age 79 to keep bones strong. But most experts recommend that adults get 800 IU to 2000 IU of vitamin D daily for optimal health benefits.

## 2021-12-29 NOTE — ASSESSMENT & PLAN NOTE
- negative rheum w/u for inflammatory arthritis. Discussed radiographic evidence of generalized OA. Discussed findings of enthesopathy in his lower extremities, pt denied inflammatory arthralgias/inflammatory back pain. - previously made referral to Dermatology for evaluation of nail dystrophy, need to r/o PsO vs fungal infection of nails given radiographic findings of extensive enthesopathy. - cont oral Diclofenac PRN. - he was prescribed Gabapentin at his last visit however pt stated that he was reportedly told by his PCP to avoid Gabapentin because \"it can depress my respiratory system\". Will defer further Tx to PCP. - will see him back on PRN basis.

## 2021-12-29 NOTE — ASSESSMENT & PLAN NOTE
- per pt EMG proven. - he was prescribed Gabapentin at his last visit however pt stated that he was reportedly told by his PCP to avoid Gabapentin because \"it can depress my respiratory system\". Will defer further Tx to PCP.

## 2022-01-31 NOTE — ED NOTES
"67 y.o. female A1 presents to the walk in clinic for sharp bladder pain/pressure with associated nausea x 6 days. Pt is in town from [place] visiting her daughter. Pt states she tried OTC AZO with no improvement. Pt states when the pain is at its worst it is an 8/10. The pain was really bad  and , but it got better and then bad again today. Pt had 2 kidney stones 3 years ago and states that the symptoms are the same.     Pt has grade 1 cystocele and grade 2 rectocele.   LMP: unknown; pt had total hysterectomy in     Surgical Hx:   Cholecystectomy, appendectomy, hysterectomy ()    /88   Ht 5' 4" (1.626 m)   Wt 72.5 kg (159 lb 13.3 oz)   LMP 2004   BMI 27.44 kg/m²      Review of Systems   Constitutional: Negative for chills and fever.   Eyes: Negative for blurred vision and double vision.   Respiratory: Negative for cough and shortness of breath.    Cardiovascular: Negative for chest pain and palpitations.   Gastrointestinal: Positive for nausea. Negative for vomiting.   Genitourinary: Positive for dysuria and flank pain.   Musculoskeletal: Positive for back pain.     Physical Exam  Constitutional:       General: She is awake. She is in acute distress.      Appearance: Normal appearance. She is normal weight.   Genitourinary:      Vulva normal.      Right Labia: No rash, tenderness or lesions.     Left Labia: No tenderness, lesions or rash.     Vaginal cuff intact.     No vaginal discharge or tenderness.      Cervix is absent.      Bladder is tender.   Rectum:      Rectal exam comments: Grade 2 rectocele.       Eyes:      Extraocular Movements: Extraocular movements intact.      Pupils: Pupils are equal, round, and reactive to light.   Pulmonary:      Effort: No respiratory distress.   Abdominal:      General: There is no distension.      Palpations: There is no mass.      Tenderness: There is abdominal tenderness.   Neurological:      Mental Status: She is alert and oriented to " 1217 Otolaryngology  nasal abscess  1244  called back     [place]  12/04/20 124 person, place, and time.   Psychiatric:         Behavior: Behavior is cooperative.   Vitals and nursing note reviewed. Exam conducted with a chaperone present.       Assessment & Plan   Urine dipstick shows Blood 250, Leukocytes +2, and trace amounts of protein. Urine culture ordered, results pending.   Stat renal CT ordered. 4mm stone at L ureteral-bladder junction with associated L-sided hydronephrosis and ureter dilation.   Pt advised to go to ED to get advised about moving forward with medical or procedural interventions.     NIALL Yepez    I have seen the patient, reviewed the nurse practitioner's assessment and plan of care. I have personally interviewed and examined the patient at bedside and: agree with the findings. Direct call to patient with instruction to proceed to ED at this time for further assessment of possible obstruction due to moderate hydroureteronephrosis, verbalized understanding.      Marisa Reyes, DEVORAP-C

## 2023-03-15 NOTE — CONSULTS
Infectious Diseases   Consult Note      Reason for Consult:  DFI and bacteremia    Requesting Physician:  Dr. Rosie Ly      Date of Admission: 9/2/2020  Subjective:   CHIEF COMPLAINT:  None given       HPI:    Pete Reyes is a 47yoM with history of DM uncontrolled with A1c 9.2. Has neuropathic ulcer L 5th MTPJ, for which he was previously placed in a boot that was removed a few weeks back. He has weekly follow-up with DPM  Earlier this week, noted malaise, weakness, subjective fever. Routine appt with Podiatry 9/2/20 - noted to be septic appearing with grossly infected L foot. He was directed to ED for evaluation. Xray of the foot with erosive changes of 5th MT head  One of 2 sets of BC collected on admission is positive for GpB strep. MRI ordered, pending     Prior R foot 5th ray amp       Current abx:  Zosyn 3.375 q8  vanc 1g q12       Past Surgical History:       Diagnosis Date    Diabetes mellitus ([address]     Foot ulcer ([address]     Right foot/diabetic         Procedure Laterality Date    ANKLE SURGERY      FOOT DEBRIDEMENT Right 7/11/2019    RIGHT FOOT  INCISION AND DRAINAGE WITH 5TH METATARSAL BONE RESECTION performed by Erik Parker DPM at [id number] [facility]      right ankle    TOE AMPUTATION Right 7/8/2019    incision and drainage right foot with possible partial 5th ray amputation performed by Erik Parker DPM at [address] History:    TOBACCO:   reports that he has never smoked. He has never used smokeless tobacco.  ETOH:   reports no history of alcohol use. There is no history of illicit drug use or other significant epidemiologic exposures. Family History:   History reviewed. No pertinent family history. There is no family history of autoimmune diseases or significant infectious diseases.       Current Medications:    Current Facility-Administered Medications: glucose (GLUTOSE) 40 % oral gel 15 g, 15 g, Oral, PRN  dextrose 50 % IV solution, 12.5 g, Intravenous, PRN  glucagon (rDNA) injection 1 mg, 1 mg, Intramuscular, PRN  dextrose 5 % solution, 100 mL/hr, Intravenous, PRN  glipiZIDE (GLUCOTROL) tablet 15 mg, 15 mg, Oral, QAM AC  piperacillin-tazobactam (ZOSYN) 3.375 g in dextrose 5 % 50 mL IVPB extended infusion (mini-bag), 3.375 g, Intravenous, Q8H  sodium chloride flush 0.9 % injection 10 mL, 10 mL, Intravenous, 2 times per day  sodium chloride flush 0.9 % injection 10 mL, 10 mL, Intravenous, PRN  acetaminophen (TYLENOL) tablet 650 mg, 650 mg, Oral, Q6H PRN **OR** acetaminophen (TYLENOL) suppository 650 mg, 650 mg, Rectal, Q6H PRN  polyethylene glycol (GLYCOLAX) packet 17 g, 17 g, Oral, Daily PRN  promethazine (PHENERGAN) tablet 12.5 mg, 12.5 mg, Oral, Q6H PRN **OR** ondansetron (ZOFRAN) injection 4 mg, 4 mg, Intravenous, Q6H PRN  enoxaparin (LOVENOX) injection 40 mg, 40 mg, Subcutaneous, Daily  insulin lispro (HUMALOG) injection vial 0-12 Units, 0-12 Units, Subcutaneous, TID WC  insulin lispro (HUMALOG) injection vial 0-6 Units, 0-6 Units, Subcutaneous, Nightly  vancomycin 1000 mg IVPB in 250 mL D5W addavial, 1,000 mg, Intravenous, Q12H      Allergies   Allergen Reactions    Metformin And Related Diarrhea        REVIEW OF SYSTEMS:    CONSTITUTIONAL:   Per HPI    EYES:  negative for blurred vision, eye discharge, visual disturbance and icterus  HEENT:  negative for acute hearing loss, tinnitus, ear drainage, sinus pressure, nasal congestion, epistaxis and snoring  RESPIRATORY:  No cough, shortness of breath, hemoptysis  CARDIOVASCULAR:  negative for chest pain, palpitations, exertional chest pressure/discomfort, edema, syncope  GASTROINTESTINAL:  negative for nausea, vomiting, diarrhea, constipation, blood in stool and abdominal pain  GENITOURINARY:  negative for frequency, dysuria, urinary incontinence, decreased urine volume, and hematuria  HEMATOLOGIC/LYMPHATIC:  negative for easy bruising, bleeding and lymphadenopathy  ALLERGIC/IMMUNOLOGIC: negative for recurrent infections, angioedema, anaphylaxis and drug reactions  ENDOCRINE:  negative for weight changes and diabetic symptoms including polyuria, polydipsia and polyphagia  MUSCULOSKELETAL:   Per HPI   NEUROLOGICAL:  negative for headaches, slurred speech, unilateral weakness  PSYCHIATRIC/BEHAVIORAL: negative for hallucinations, behavioral problems, confusion and agitation. Objective:   PHYSICAL EXAM:      VITALS:  /63   Pulse 61   Temp 98.5 °F (36.9 °C) (Oral)   Resp 16   Ht 5' 9\" (1.753 m)   Wt 233 lb 1 oz (105.7 kg)   SpO2 96%   BMI 34.42 kg/m²      24HR INTAKE/OUTPUT:      Intake/Output Summary (Last 24 hours) at 9/3/2020 1526  Last data filed at 9/3/2020 1315  Gross per 24 hour   Intake 1500 ml   Output 880 ml   Net 620 ml     CONSTITUTIONAL:  Awake, alert, cooperative, no apparent distress, and appears stated age  [de-identified]: NCAT, PERRL, EOMI. Sclera white, conjunctiva full. OP with moist mucosal membranes, no thrush, tongue protrudes midline  NECK:  Supple, symmetrical, trachea midline, no adenopathy  LUNGS:  no increased work of breathing, CTA wilmer   CARDIOVASCULAR:  RRR without murmur  ABDOMEN:  normal bowel sounds, soft, flat, NT   PSYCHIATRIC: Oriented to person place and time. No obvious depression or anxiety. MUSCULOSKELETAL:  Ulcer with purulence and necrosis lateral foot at 5th MTPJ with faint surrounding erythema   DP pulse 2+   SKIN:  normal skin color, texture, turgor and no redness, warmth, or swelling.  No palpable nodules or stigmata of embolic phenomenon  NEUROLOGIC: nonfocal exam  ACCESS:   IV site ok       DATA:    Old records have been reviewed    CBC:  Recent Labs     09/02/20  1515   WBC 11.0   RBC 4.79   HGB 14.1   HCT 42.4      MCV 88.5   MCH 29.5   MCHC 33.3   RDW 13.6      BMP:  Recent Labs     09/02/20  1515 09/03/20  0836   * 133*   K 4.7 3.9   CL 97* 98*   CO2 22 22   BUN 16 15   CREATININE 1.2 0.9   CALCIUM 9.0 8.6   GLUCOSE 215* 199* Cultures:   9/2 BC #1 NGTD   BC #2 GpBS       Radiology Review:  All pertinent images / reports were reviewed as a part of this visit. Xray L foot 9/2/20   Impression    Suspected bone destruction such as osteomyelitis involving the base of the    1st distal phalanx and lesser degree medial aspect of the adjacent proximal    phalanx across the joint space.  This is new from the prior study. Osteomyelitis primarily considered particularly given the history.         Other findings above.             Assessment:     Patient Active Problem List   Diagnosis    Osteomyelitis ([address]    Right foot infection    Hypertension associated with diabetes ([address]    Type 2 diabetes mellitus ([address]    Sepsis ([address]    Uncontrolled type 2 diabetes mellitus with hyperglycemia ([address]    High fever    Bandemia    Closed displaced fracture of fifth metatarsal bone of right foot    Elevated sed rate    Elevated C-reactive protein (CRP)    Overweight    Failure of outpatient treatment    Diabetic polyneuropathy associated with type 2 diabetes mellitus ([address]    Receiving intravenous antibiotic treatment as outpatient    Weight loss counseling, encounter for       Jacky Gomez is a 47yoM who is evaluated for the following:    Uncontrolled DM     Admitted 9/2/20 with sepsis and L foot infection    Infection of neuropathic ulcer of L foot  Suspected extension OM of the 5th toe/MTPJ/met head. MRI pending     Group B strep bacteremia secondary to L foot infection  May be a polymicrobial infection in the foot      No abx allergies      Recs:  Continue Zosyn  DC vanc   Await MRI and Podiatry input. May require ray amp 5th toe    Needs improved glycemic control to promote wound healing and prevent further limb loss     We will follow        Bharat Negrete M.D. Thank you for the opportunity to participate in the care of your patient.     Please do not hesitate to contact me:   [phone] office  [phone] mobile Detail Level: Generalized Detail Level: Detailed Detail Level: Zone

## 2023-09-05 ENCOUNTER — APPOINTMENT (OUTPATIENT)
Dept: GENERAL RADIOLOGY | Age: 58
End: 2023-09-05
Payer: COMMERCIAL

## 2023-09-05 ENCOUNTER — HOSPITAL ENCOUNTER (EMERGENCY)
Age: 58
Discharge: HOME | End: 2023-09-05
Attending: EMERGENCY MEDICINE
Payer: COMMERCIAL

## 2023-09-05 VITALS
SYSTOLIC BLOOD PRESSURE: 119 MMHG | DIASTOLIC BLOOD PRESSURE: 65 MMHG | HEIGHT: 69 IN | WEIGHT: 265 LBS | OXYGEN SATURATION: 98 % | HEART RATE: 56 BPM | RESPIRATION RATE: 18 BRPM | TEMPERATURE: 98 F | BODY MASS INDEX: 39.25 KG/M2

## 2023-09-05 DIAGNOSIS — S20.212A CONTUSION OF RIB ON LEFT SIDE, INITIAL ENCOUNTER: Primary | ICD-10-CM

## 2023-09-05 PROCEDURE — 6370000000 HC RX 637 (ALT 250 FOR IP): Performed by: EMERGENCY MEDICINE

## 2023-09-05 PROCEDURE — 71101 X-RAY EXAM UNILAT RIBS/CHEST: CPT

## 2023-09-05 PROCEDURE — 99283 EMERGENCY DEPT VISIT LOW MDM: CPT

## 2023-09-05 RX ORDER — ACETAMINOPHEN 325 MG/1
650 TABLET ORAL ONCE
Status: COMPLETED | OUTPATIENT
Start: 2023-09-05 | End: 2023-09-05

## 2023-09-05 RX ORDER — IBUPROFEN 600 MG/1
600 TABLET ORAL ONCE
Status: COMPLETED | OUTPATIENT
Start: 2023-09-05 | End: 2023-09-05

## 2023-09-05 RX ORDER — OXYCODONE HYDROCHLORIDE AND ACETAMINOPHEN 5; 325 MG/1; MG/1
1 TABLET ORAL EVERY 6 HOURS PRN
Qty: 12 TABLET | Refills: 0 | Status: SHIPPED | OUTPATIENT
Start: 2023-09-05 | End: 2023-09-08

## 2023-09-05 RX ADMIN — IBUPROFEN 600 MG: 600 TABLET, FILM COATED ORAL at 16:41

## 2023-09-05 RX ADMIN — ACETAMINOPHEN 650 MG: 325 TABLET ORAL at 16:41

## 2023-09-05 ASSESSMENT — PAIN SCALES - GENERAL: PAINLEVEL_OUTOF10: 8

## 2023-09-05 ASSESSMENT — PAIN DESCRIPTION - ORIENTATION: ORIENTATION: LEFT

## 2023-09-05 ASSESSMENT — PAIN - FUNCTIONAL ASSESSMENT: PAIN_FUNCTIONAL_ASSESSMENT: 0-10

## 2023-09-05 ASSESSMENT — PAIN DESCRIPTION - LOCATION: LOCATION: RIB CAGE
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
## 2023-09-06 ENCOUNTER — HOSPITAL ENCOUNTER (OUTPATIENT)
Dept: VASCULAR LAB | Age: 58
Discharge: HOME | End: 2023-09-06
Payer: COMMERCIAL

## 2023-09-06 DIAGNOSIS — I70.213 ATHEROSCLEROSIS OF NATIVE ARTERIES OF EXTREMITIES WITH INTERMITTENT CLAUDICATION, BILATERAL LEGS (HCC): ICD-10-CM

## 2023-09-06 PROCEDURE — 93925 LOWER EXTREMITY STUDY: CPT

## 2023-10-06 ENCOUNTER — OFFICE VISIT (OUTPATIENT)
Dept: VASCULAR SURGERY | Age: 58
End: 2023-10-06

## 2023-10-06 VITALS
SYSTOLIC BLOOD PRESSURE: 140 MMHG | BODY MASS INDEX: 40.14 KG/M2 | DIASTOLIC BLOOD PRESSURE: 70 MMHG | WEIGHT: 271 LBS | HEIGHT: 69 IN

## 2023-10-06 DIAGNOSIS — G89.29 CHRONIC PAIN OF RIGHT ANKLE: Primary | ICD-10-CM

## 2023-10-06 DIAGNOSIS — M25.571 CHRONIC PAIN OF RIGHT ANKLE: Primary | ICD-10-CM

## 2025-04-04 ENCOUNTER — HOSPITAL ENCOUNTER (EMERGENCY)
Age: 60
Discharge: HOME | End: 2025-04-04
Attending: EMERGENCY MEDICINE
Payer: COMMERCIAL

## 2025-04-04 VITALS
OXYGEN SATURATION: 98 % | DIASTOLIC BLOOD PRESSURE: 53 MMHG | HEART RATE: 64 BPM | SYSTOLIC BLOOD PRESSURE: 154 MMHG | BODY MASS INDEX: 40.43 KG/M2 | TEMPERATURE: 98.7 F | HEIGHT: 70 IN | RESPIRATION RATE: 16 BRPM | WEIGHT: 282.4 LBS

## 2025-04-04 DIAGNOSIS — N17.9 AKI (ACUTE KIDNEY INJURY): ICD-10-CM

## 2025-04-04 DIAGNOSIS — E87.5 HYPERKALEMIA: Primary | ICD-10-CM

## 2025-04-04 LAB
ALBUMIN SERPL-MCNC: 3.7 G/DL (ref 3.4–5)
ALBUMIN/GLOB SERPL: 1.2 {RATIO} (ref 1.1–2.2)
ALP SERPL-CCNC: 115 U/L (ref 40–129)
ALT SERPL-CCNC: 14 U/L (ref 10–40)
ANION GAP SERPL CALCULATED.3IONS-SCNC: 9 MMOL/L (ref 3–16)
AST SERPL-CCNC: 15 U/L (ref 15–37)
BASOPHILS # BLD: 0.1 K/UL (ref 0–0.2)
BASOPHILS NFR BLD: 1 %
BILIRUB SERPL-MCNC: 0.3 MG/DL (ref 0–1)
BILIRUB UR QL STRIP.AUTO: NEGATIVE
BUN SERPL-MCNC: 28 MG/DL (ref 7–20)
CALCIUM SERPL-MCNC: 9.2 MG/DL (ref 8.3–10.6)
CHLORIDE SERPL-SCNC: 108 MMOL/L (ref 99–110)
CLARITY UR: CLEAR
CO2 SERPL-SCNC: 26 MMOL/L (ref 21–32)
COLOR UR: YELLOW
CREAT SERPL-MCNC: 1.4 MG/DL (ref 0.9–1.3)
DEPRECATED RDW RBC AUTO: 15.3 % (ref 12.4–15.4)
EOSINOPHIL # BLD: 0.4 K/UL (ref 0–0.6)
EOSINOPHIL NFR BLD: 3.7 %
GFR SERPLBLD CREATININE-BSD FMLA CKD-EPI: 58 ML/MIN/{1.73_M2}
GLUCOSE SERPL-MCNC: 154 MG/DL (ref 70–99)
GLUCOSE UR STRIP.AUTO-MCNC: >=1000 MG/DL
HCT VFR BLD AUTO: 39.4 % (ref 40.5–52.5)
HGB BLD-MCNC: 13.1 G/DL (ref 13.5–17.5)
HGB UR QL STRIP.AUTO: NEGATIVE
KETONES UR STRIP.AUTO-MCNC: NEGATIVE MG/DL
LEUKOCYTE ESTERASE UR QL STRIP.AUTO: NEGATIVE
LYMPHOCYTES # BLD: 2.3 K/UL (ref 1–5.1)
LYMPHOCYTES NFR BLD: 22.5 %
MCH RBC QN AUTO: 29.4 PG (ref 26–34)
MCHC RBC AUTO-ENTMCNC: 33.2 G/DL (ref 31–36)
MCV RBC AUTO: 88.6 FL (ref 80–100)
MONOCYTES # BLD: 0.9 K/UL (ref 0–1.3)
MONOCYTES NFR BLD: 8.9 %
NEUTROPHILS # BLD: 6.6 K/UL (ref 1.7–7.7)
NEUTROPHILS NFR BLD: 63.9 %
NITRITE UR QL STRIP.AUTO: NEGATIVE
PH UR STRIP.AUTO: 6 [PH] (ref 5–8)
PLATELET # BLD AUTO: 304 K/UL (ref 135–450)
PMV BLD AUTO: 7.8 FL (ref 5–10.5)
POTASSIUM SERPL-SCNC: 5.1 MMOL/L (ref 3.5–5.1)
PROT SERPL-MCNC: 6.9 G/DL (ref 6.4–8.2)
PROT UR STRIP.AUTO-MCNC: NEGATIVE MG/DL
RBC # BLD AUTO: 4.44 M/UL (ref 4.2–5.9)
SODIUM SERPL-SCNC: 143 MMOL/L (ref 136–145)
SP GR UR STRIP.AUTO: 1.01 (ref 1–1.03)
UA COMPLETE W REFLEX CULTURE PNL UR: ABNORMAL
UA DIPSTICK W REFLEX MICRO PNL UR: ABNORMAL
URN SPEC COLLECT METH UR: ABNORMAL
UROBILINOGEN UR STRIP-ACNC: 0.2 E.U./DL
WBC # BLD AUTO: 10.4 K/UL (ref 4–11)

## 2025-04-04 PROCEDURE — 80053 COMPREHEN METABOLIC PANEL: CPT

## 2025-04-04 PROCEDURE — 85025 COMPLETE CBC W/AUTO DIFF WBC: CPT

## 2025-04-04 PROCEDURE — 81003 URINALYSIS AUTO W/O SCOPE: CPT

## 2025-04-04 PROCEDURE — 99284 EMERGENCY DEPT VISIT MOD MDM: CPT

## 2025-04-04 PROCEDURE — 93005 ELECTROCARDIOGRAM TRACING: CPT | Performed by: EMERGENCY MEDICINE

## 2025-04-04 PROCEDURE — 2580000003 HC RX 258: Performed by: EMERGENCY MEDICINE

## 2025-04-04 RX ORDER — 0.9 % SODIUM CHLORIDE 0.9 %
1000 INTRAVENOUS SOLUTION INTRAVENOUS ONCE
Status: COMPLETED | OUTPATIENT
Start: 2025-04-04 | End: 2025-04-04

## 2025-04-04 RX ADMIN — SODIUM CHLORIDE 1000 ML: 0.9 INJECTION, SOLUTION INTRAVENOUS at 22:04

## 2025-04-05 LAB
EKG ATRIAL RATE: 61 BPM
EKG DIAGNOSIS: NORMAL
EKG P AXIS: 25 DEGREES
EKG P-R INTERVAL: 168 MS
EKG Q-T INTERVAL: 390 MS
EKG QRS DURATION: 86 MS
EKG QTC CALCULATION (BAZETT): 392 MS
EKG R AXIS: 13 DEGREES
EKG T AXIS: 38 DEGREES
EKG VENTRICULAR RATE: 61 BPM

## 2025-04-05 PROCEDURE — 93010 ELECTROCARDIOGRAM REPORT: CPT | Performed by: INTERNAL MEDICINE

## 2025-04-05 ASSESSMENT — ENCOUNTER SYMPTOMS
COUGH: 0
SHORTNESS OF BREATH: 0
VOMITING: 0
NAUSEA: 0

## 2025-04-05 NOTE — ED PROVIDER NOTES
Emergency Department Attending Physician Note  Location: [facility] EMERGENCY DEPARTMENT  4/4/2025       Pt Name: Riley Horner  MRN: [id number]  Birthdate 1965    Date of evaluation: 4/4/2025  Provider: SANTOSH DEL REAL DO  PCP: Murphy Christopher MD    Note Started: 9:40 PM EDT 4/4/25    CHIEF COMPLAINT  Chief Complaint   Patient presents with    Abnormal Lab     Pt sent to ED by PCP for K level of 6.5.   Pt denies any chest pain.        ROOM: 31    HISTORY OF PRESENT ILLNESS:  History obtained by patient. Limitations to history : None.     Riley Horner is a 59 y.o. male with a significant PMHx of diabetes, previous diabetic foot ulcer, and other comorbidities listed below, here in the emergency department today with concerns of hyperkalemia, got called by his PCP at [place], that his potassium was 6.5, and his \"kidney numbers,\" were elevated.  No known history of any kidney disease.  No leg swelling or shortness of breath.  No chest pain or palpitations.  Says he feels completely fine, but has had some calf cramping overnight for the past couple of weeks on and off.  Thought it was just the way he was sleeping.  Denies any other concerns today in the emergency department including any nausea vomiting or diarrhea.  No recent fevers, chills, or illnesses.  Says he drinks Diet Coke, but also drinks 7-8 bottles of water a day.    Nursing Notes were all reviewed and agreed with or any disagreements were addressed in the HPI.      MEDICAL HISTORY  Past Medical History:   Diagnosis Date    Diabetes mellitus ([facility])     Foot ulcer ([facility])     Right foot/diabetic    MRSA (methicillin resistant staph aureus) culture positive 09/03/2020    foot    MRSA (methicillin resistant staph aureus) culture positive 12/04/2020    nasal bridge        SURGICAL HISTORY  Past Surgical History:   Procedure Laterality Date    ANKLE SURGERY      FOOT DEBRIDEMENT Right 7/11/2019    RIGHT FOOT  INCISION AND DRAINAGE WITH 5TH METATARSAL  treatment of emergent care for this encounter.     This chart was generated in part by using Dragon Dictation system and may contain errors related to that system including errors in grammar, punctuation, and spelling, as well as words and phrases that may be inappropriate. If there are any questions or concerns please feel free to contact the dictating provider for clarification.     GEORGETTE DEL REAL DO (electronically signed)    Acute Care [facility]          Georgette Del Real DO  04/05/25 0344

## 2025-04-05 NOTE — DISCHARGE INSTRUCTIONS
In the ER your potassium was 5.1.  You got IV fluids, which would also lower it a little more.  Please stay hydrated with water.  Follow-up with nephrology, as your kidney numbers are slightly elevated for your baseline.

## (undated) DEVICE — HANDPIECE SET WITH HIGH FLOW TIP AND SUCTION TUBE: Brand: INTERPULSE

## (undated) DEVICE — CONVERTED USE 304929 SPONGE GAUZE CURITY 4X4IN 16-PLY ST

## (undated) DEVICE — Z CONVERTED USE 2271043 CONTAINER SPEC COLL 4OZ SCR ON LID PEEL PCH

## (undated) DEVICE — BASIC SINGLE BASIN 1-LF: Brand: MEDLINE INDUSTRIES, INC.

## (undated) DEVICE — GAUZE,SPONGE,4"X4",16PLY,XRAY,STRL,LF: Brand: MEDLINE

## (undated) DEVICE — Z DISCONTINUED PER MEDLINE TRAY SKIN PREP DRY W/ PREM GLV APPLICATORS GZ TWL OVERWRAP

## (undated) DEVICE — SMARTGOWN SURGICAL GOWN, LARGE: Brand: CONVERTORS

## (undated) DEVICE — SUTURE VCRL + SZ 3-0 L27IN ABSRB UD L26MM SH 1/2 CIR VCP416H

## (undated) DEVICE — SUTURE ETHLN SZ 4-0 L18IN NONABSORBABLE BLK PC-1 L13MM 3/8 1854G

## (undated) DEVICE — PACK EXTREMITY XR

## (undated) DEVICE — PACK PROC ORTH LO EXT IX CUST

## (undated) DEVICE — ZIMMER® STERILE DISPOSABLE TOURNIQUET CUFF WITH PLC, DUAL PORT, SINGLE BLADDER, 18 IN. (46 CM)

## (undated) DEVICE — GLOVE SURG SZ 65 THK91MIL LTX FREE SYN POLYISOPRENE

## (undated) DEVICE — SUTURE NONABSORBABLE MONOFILAMENT 3-0 PS-1 18 IN BLK ETHILON 1663H

## (undated) DEVICE — STERILE LATEX POWDER-FREE SURGICAL GLOVESWITH NITRILE AND EMOLLIENT COATINGS: Brand: PROTEXIS

## (undated) DEVICE — GLOVE ORANGE PI 7   MSG9070

## (undated) DEVICE — SUTURE ETHLN SZ 3-0 L18IN NONABSORBABLE BLK L24MM PS-1 3/8 1663G

## (undated) DEVICE — 1010 S-DRAPE TOWEL DRAPE 10/BX: Brand: STERI-DRAPE™

## (undated) DEVICE — SOLUTION IRRIG 2000ML 0.9% SOD CHL USP UROMATIC PLAS CONT

## (undated) DEVICE — ZIMMER® STERILE DISPOSABLE TOURNIQUET CUFF WITH PLC, DUAL PORT, DUAL BLADDER, 18 IN. (46 CM)

## (undated) DEVICE — BANDAGE,GAUZE,4.5"X4.1YD,STERILE,LF: Brand: MEDLINE

## (undated) DEVICE — PADDING CAST W4INXL4YD ST COT RAYON MICROPLEATED HIGHLY

## (undated) DEVICE — IODOFORM PACKING STRIP: Brand: CURITY

## (undated) DEVICE — WET SKIN SCRUB PREP TRAY: Brand: KENDALL

## (undated) DEVICE — OCCLUSIVE GAUZE STRIP,3% BISMUTH TRIBROMOPHENATE IN PETROLATUM BLEND: Brand: XEROFORM

## (undated) DEVICE — BANDAGE COMPR ELASTIC 5 YDX4 IN LT

## (undated) DEVICE — PENROSE DRAIN 12" X 1/4: Brand: CARDINAL HEALTH

## (undated) DEVICE — SHOE POSTOP M MAN 9-11 UNIV FOAM TRICOT SEMI FLX SKID

## (undated) DEVICE — COVER XR CASS W20XL41IN UNIV ADH STRP

## (undated) DEVICE — INTENDED FOR TISSUE SEPARATION, AND OTHER PROCEDURES THAT REQUIRE A SHARP SURGICAL BLADE TO PUNCTURE OR CUT.: Brand: BARD-PARKER ® STAINLESS STEEL BLADES

## (undated) DEVICE — Z CONVERTED USE 2273164 BANDAGE COMPR W4INXL4 1/2YD E EC SGL LAYERED CLP CLSR ECONO

## (undated) DEVICE — GAUZE,SPONGE,4"X4",8PLY,STRL,LF,10/TRAY: Brand: MEDLINE

## (undated) DEVICE — PAD,ABDOMINAL,8"X10",ST,LF: Brand: MEDLINE

## (undated) DEVICE — PRECISION THIN (9.0 X 0.38 X 25.0MM)

## (undated) DEVICE — BANDAGE ROLL,100% COTTON, 8 PLY, LARGE: Brand: KERLIX

## (undated) DEVICE — 10FR FRAZIER SUCTION HANDLE: Brand: CARDINAL HEALTH

## (undated) DEVICE — SOLUTION IV IRRIG 500ML 0.9% SODIUM CHL 2F7123

## (undated) DEVICE — 3M™ COBAN™ SELF-ADHERENT WRAP, 1586S, STERILE, 6 IN X 5 YD (15 CM X 4,5 M), 12 ROLLS/CASE: Brand: 3M™ COBAN™

## (undated) DEVICE — SMARTGOWN SURGICAL GOWN, XL: Brand: CONVERTORS